# Patient Record
Sex: FEMALE | Race: WHITE | HISPANIC OR LATINO | Employment: FULL TIME | URBAN - NONMETROPOLITAN AREA
[De-identification: names, ages, dates, MRNs, and addresses within clinical notes are randomized per-mention and may not be internally consistent; named-entity substitution may affect disease eponyms.]

---

## 2017-08-05 ENCOUNTER — OFFICE VISIT (OUTPATIENT)
Dept: URGENT CARE | Facility: PHYSICIAN GROUP | Age: 62
End: 2017-08-05
Payer: MEDICAID

## 2017-08-05 ENCOUNTER — HOSPITAL ENCOUNTER (OUTPATIENT)
Facility: MEDICAL CENTER | Age: 62
End: 2017-08-05
Attending: PHYSICIAN ASSISTANT
Payer: MEDICAID

## 2017-08-05 VITALS
BODY MASS INDEX: 30.39 KG/M2 | WEIGHT: 178 LBS | OXYGEN SATURATION: 95 % | HEART RATE: 96 BPM | SYSTOLIC BLOOD PRESSURE: 114 MMHG | TEMPERATURE: 97.3 F | DIASTOLIC BLOOD PRESSURE: 78 MMHG | HEIGHT: 64 IN

## 2017-08-05 DIAGNOSIS — N30.00 ACUTE CYSTITIS WITHOUT HEMATURIA: Primary | ICD-10-CM

## 2017-08-05 DIAGNOSIS — R30.0 DYSURIA: ICD-10-CM

## 2017-08-05 DIAGNOSIS — N30.00 ACUTE CYSTITIS WITHOUT HEMATURIA: ICD-10-CM

## 2017-08-05 LAB
APPEARANCE UR: CLEAR
BILIRUB UR STRIP-MCNC: NORMAL MG/DL
COLOR UR AUTO: YELLOW
GLUCOSE UR STRIP.AUTO-MCNC: NORMAL MG/DL
KETONES UR STRIP.AUTO-MCNC: NORMAL MG/DL
LEUKOCYTE ESTERASE UR QL STRIP.AUTO: NORMAL
NITRITE UR QL STRIP.AUTO: NORMAL
PH UR STRIP.AUTO: 6.5 [PH] (ref 5–8)
PROT UR QL STRIP: NORMAL MG/DL
RBC UR QL AUTO: NORMAL
SP GR UR STRIP.AUTO: 1.01
UROBILINOGEN UR STRIP-MCNC: NORMAL MG/DL

## 2017-08-05 PROCEDURE — 87086 URINE CULTURE/COLONY COUNT: CPT

## 2017-08-05 PROCEDURE — 81002 URINALYSIS NONAUTO W/O SCOPE: CPT | Performed by: PHYSICIAN ASSISTANT

## 2017-08-05 PROCEDURE — 99214 OFFICE O/P EST MOD 30 MIN: CPT | Mod: 25 | Performed by: PHYSICIAN ASSISTANT

## 2017-08-05 RX ORDER — NITROFURANTOIN 25; 75 MG/1; MG/1
100 CAPSULE ORAL EVERY 12 HOURS
Qty: 10 CAP | Refills: 0 | Status: SHIPPED | OUTPATIENT
Start: 2017-08-05 | End: 2017-08-10

## 2017-08-05 RX ORDER — PHENAZOPYRIDINE HYDROCHLORIDE 200 MG/1
200 TABLET, FILM COATED ORAL 3 TIMES DAILY
Qty: 6 TAB | Refills: 0 | Status: SHIPPED | OUTPATIENT
Start: 2017-08-05 | End: 2017-08-07

## 2017-08-05 NOTE — PROGRESS NOTES
Chief Complaint   Patient presents with   • Dysuria       HISTORY OF PRESENT ILLNESS: Patient is a 62 y.o. female who presents today because she has a 5-70. History of increased urinary urgency, frequency, dysuria, lower abdominal pain. She denies any fevers, chills, nausea, vomiting or diarrhea. She has not been taking any medications for her symptoms.    Patient Active Problem List    Diagnosis Date Noted   • Diabetes mellitus, type II (CMS-[facility]) 10/17/2012       Allergies:Review of patient's allergies indicates no known allergies.    Current Outpatient Prescriptions Ordered in [facility]   Medication Sig Dispense Refill   • phenazopyridine (PYRIDIUM) 200 MG Tab Take 1 Tab by mouth 3 times a day for 2 days. 6 Tab 0   • nitrofurantoin monohydr macro (MACROBID) 100 MG Cap Take 1 Cap by mouth every 12 hours for 5 days. 10 Cap 0   • metformin (GLUCOPHAGE) 500 MG TABS TAKE ONE TABLET BY MOUTH TWICE DAILY WITH  MEALS 180 Each 2   • amoxicillin (AMOXIL) 875 MG tablet Take 1 Tab by mouth 2 times a day. 20 Tab 0     No current Epic-ordered facility-administered medications on file.       Past Medical History   Diagnosis Date   • Diabetes mellitus type II 10/17/2012       Social History   Substance Use Topics   • Smoking status: Never Smoker    • Smokeless tobacco: Never Used   • Alcohol Use: No       Family Status   Relation Status Death Age   • Mother Alive    • Father       Family History   Problem Relation Age of Onset   • Diabetes Neg Hx    • Hypertension Neg Hx    • Heart Disease Neg Hx    • Cancer Neg Hx        ROS:  Review of Systems   Constitutional: Negative for fever, chills, weight loss and malaise/fatigue.   HENT: Negative for ear pain, nosebleeds, congestion, sore throat and neck pain.    Eyes: Negative for blurred vision.   Respiratory: Negative for cough, sputum production, shortness of breath and wheezing.    Cardiovascular: Negative for chest pain, palpitations, orthopnea and leg swelling.  "  Gastrointestinal: Negative for heartburn, nausea, vomiting and positive for lower abdominal pain.   Genitourinary: Positive for dysuria, urgency and frequency.     Exam:  Blood pressure 114/78, pulse 96, temperature 36.3 °C (97.3 °F), height 1.626 m (5' 4\"), weight 80.74 kg (178 lb), SpO2 95 %.  General:  Well nourished, well developed female in NAD  Head:Normocephalic, atraumatic  Eyes: PERRLA, EOM within normal limits, no conjunctival injection, no scleral icterus, visual fields and acuity grossly intact.  Abdomen: Nondistended, bowel tones in all 4 quadrants, soft, she has superpubic tenderness to palpation, no other tenderness to palpation, no organomegaly, no rebound, referred tenderness, no Casas's or McBurney's point tenderness.  Extremities: no clubbing, cyanosis, or edema.        Please note that this dictation was created using voice recognition software. I have made every reasonable attempt to correct obvious errors, but I expect that there are errors of grammar and possibly content that I did not discover before finalizing the note.    Assessment/Plan:  1. Acute cystitis without hematuria  nitrofurantoin monohydr macro (MACROBID) 100 MG Cap    Urine Culture   2. Dysuria  POCT Urinalysis    phenazopyridine (PYRIDIUM) 200 MG Tab   Urinalysis in the office was unremarkable, however, symptomology and physical exam consistent with UTI, we'll culture the urine          Followup with primary care in the next 7-10 days if not significantly improving, return to the urgent care or go to the emergency room sooner for any worsening of symptoms.         "

## 2017-08-05 NOTE — MR AVS SNAPSHOT
"        Leti Arana   2017 12:35 PM   Office Visit   MRN: [id number]    Department:  [facility]   Dept Phone:  [phone]    Description:  Female : 1955   Provider:  Salvador Garcia PA-C           Reason for Visit     Dysuria           Allergies as of 2017     No Known Allergies      You were diagnosed with     Acute cystitis without hematuria   [229246]  -  Primary     Dysuria   [788.1.ICD-9-CM]         Vital Signs     Blood Pressure Pulse Temperature Height Weight Body Mass Index    114/78 mmHg 96 36.3 °C (97.3 °F) 1.626 m (5' 4\") 80.74 kg (178 lb) 30.54 kg/m2    Oxygen Saturation Smoking Status                95% Never Smoker           Basic Information     Date Of Birth Sex Race Ethnicity Preferred Language    1955 Female  or   Origin (Slovenian,Congolese,Lithuanian,Andorran, etc) English      Problem List              ICD-10-CM Priority Class Noted - Resolved    Diabetes mellitus, type II (CMS-HCC) E11.9   10/17/2012 - Present      Health Maintenance        Date Due Completion Dates    A1C SCREENING 1955 ---    DIABETES MONOFILAMENT / LE EXAM 1955 ---    RETINAL SCREENING 1973 ---    FASTING LIPID PROFILE 1973 ---    URINE ACR / MICROALBUMIN 1973 ---    SERUM CREATININE 1973 ---    IMM DTaP/Tdap/Td Vaccine (1 - Tdap) 1974 ---    IMM PNEUMOCOCCAL 19-64 (ADULT) MEDIUM RISK SERIES (1 of 1 - PPSV23) 1974 ---    PAP SMEAR 1976 ---    MAMMOGRAM 1995 ---    COLONOSCOPY 2005 ---    IMM ZOSTER VACCINE 2015 ---    IMM INFLUENZA (1) 2017 ---            Current Immunizations     No immunizations on file.      Below and/or attached are the medications your provider expects you to take. Review all of your home medications and newly ordered medications with your provider and/or pharmacist. Follow medication instructions as directed by your provider and/or pharmacist. Please keep your medication list with you " and share with your provider. Update the information when medications are discontinued, doses are changed, or new medications (including over-the-counter products) are added; and carry medication information at all times in the event of emergency situations     Allergies:  No Known Allergies          Medications  Valid as of: August 05, 2017 - 12:59 PM    Generic Name Brand Name Tablet Size Instructions for use    Amoxicillin (Tab) AMOXIL 875 MG Take 1 Tab by mouth 2 times a day.        MetFORMIN HCl (Tab) GLUCOPHAGE 500 MG TAKE ONE TABLET BY MOUTH TWICE DAILY WITH  MEALS        Nitrofurantoin Monohyd Macro (Cap) MACROBID 100 MG Take 1 Cap by mouth every 12 hours for 5 days.        Phenazopyridine HCl (Tab) PYRIDIUM 200 MG Take 1 Tab by mouth 3 times a day for 2 days.        .                 Medicines prescribed today were sent to:     [facility] [address] [place] - [address] AT [place] [address]    [address] [address]    Phone: [phone] Fax: [phone]    Open 24 Hours?: No    [facility] Critical access hospital[facility] [place], [place] - [address]    [address] [place] [address]    Phone: [phone] Fax: [phone]    Open 24 Hours?: No      Medication refill instructions:       If your prescription bottle indicates you have medication refills left, it is not necessary to call your provider’s office. Please contact your pharmacy and they will refill your medication.    If your prescription bottle indicates you do not have any refills left, you may request refills at any time through one of the following ways: The online [employer] system (except Urgent Care), by calling your provider’s office, or by asking your pharmacy to contact your provider’s office with a refill request. Medication refills are processed only during regular business hours and may not be available until the next business day. Your provider may request additional information or to have a follow-up visit with you  prior to refilling your medication.   *Please Note: Medication refills are assigned a new Rx number when refilled electronically. Your pharmacy may indicate that no refills were authorized even though a new prescription for the same medication is available at the pharmacy. Please request the medicine by name with the pharmacy before contacting your provider for a refill.        Your To Do List     Future Labs/Procedures Complete By Expires    Urine Culture  As directed 8/5/2018         [employer] Access Code: [email or web address]  Expires: 9/4/2017 12:33 PM    [employer]  A secure, online tool to manage your health information     [employer]’s [employer]® is a secure, online tool that connects you to your personalized health information from the privacy of your home -- day or night - making it very easy for you to manage your healthcare. Once the activation process is completed, you can even access your medical information using the [employer] joseph, which is available for free in the [employer] Joseph store or Google Play store.     [employer] provides the following levels of access (as shown below):   My Chart Features   [facility] Doctor [facility]  Specialists [facility]  Urgent  Care Non-[facility]  Primary Care  Doctor   Email your healthcare team securely and privately 24/7 X X X    Manage appointments: schedule your next appointment; view details of past/upcoming appointments X      Request prescription refills. X      View recent personal medical records, including lab and immunizations X X X X   View health record, including health history, allergies, medications X X X X   Read reports about your outpatient visits, procedures, consult and ER notes X X X X   See your discharge summary, which is a recap of your hospital and/or ER visit that includes your diagnosis, lab results, and care plan. X X       How to register for [employer]:  1. Go to  [email or web address].  2. Click on the Sign Up Now box, which takes you to the New Member  Sign Up page. You will need to provide the following information:  a. Enter your [employer] Access Code exactly as it appears at the top of this page. (You will not need to use this code after you’ve completed the sign-up process. If you do not sign up before the expiration date, you must request a new code.)   b. Enter your date of birth.   c. Enter your home email address.   d. Click Submit, and follow the next screen’s instructions.  3. Create a [employer] ID. This will be your [employer] login ID and cannot be changed, so think of one that is secure and easy to remember.  4. Create a [employer] password. You can change your password at any time.  5. Enter your Password Reset Question and Answer. This can be used at a later time if you forget your password.   6. Enter your e-mail address. This allows you to receive e-mail notifications when new information is available in [employer].  7. Click Sign Up. You can now view your health information.    For assistance activating your [employer] account, call [phone]

## 2017-08-07 LAB
BACTERIA UR CULT: NORMAL
SIGNIFICANT IND 70042: NORMAL
SOURCE SOURCE: NORMAL

## 2017-11-22 ENCOUNTER — OFFICE VISIT (OUTPATIENT)
Dept: URGENT CARE | Facility: PHYSICIAN GROUP | Age: 62
End: 2017-11-22
Payer: MEDICAID

## 2017-11-22 VITALS
DIASTOLIC BLOOD PRESSURE: 80 MMHG | HEIGHT: 63 IN | TEMPERATURE: 98.4 F | SYSTOLIC BLOOD PRESSURE: 120 MMHG | RESPIRATION RATE: 14 BRPM | OXYGEN SATURATION: 96 % | WEIGHT: 179 LBS | BODY MASS INDEX: 31.71 KG/M2 | HEART RATE: 80 BPM

## 2017-11-22 DIAGNOSIS — K08.89 PAIN, DENTAL: ICD-10-CM

## 2017-11-22 DIAGNOSIS — E66.9 OBESITY (BMI 30-39.9): ICD-10-CM

## 2017-11-22 DIAGNOSIS — K04.7 DENTAL INFECTION: Primary | ICD-10-CM

## 2017-11-22 PROCEDURE — 99214 OFFICE O/P EST MOD 30 MIN: CPT | Performed by: PHYSICIAN ASSISTANT

## 2017-11-22 RX ORDER — IBUPROFEN 600 MG/1
600 TABLET ORAL EVERY 6 HOURS PRN
COMMUNITY
End: 2020-02-28

## 2017-11-22 RX ORDER — TRAMADOL HYDROCHLORIDE 50 MG/1
50-100 TABLET ORAL EVERY 6 HOURS PRN
Qty: 30 TAB | Refills: 0 | Status: SHIPPED | OUTPATIENT
Start: 2017-11-22 | End: 2017-12-14

## 2017-11-22 RX ORDER — AMOXICILLIN AND CLAVULANATE POTASSIUM 875; 125 MG/1; MG/1
1 TABLET, FILM COATED ORAL 2 TIMES DAILY
Qty: 20 TAB | Refills: 0 | Status: SHIPPED | OUTPATIENT
Start: 2017-11-22 | End: 2017-12-02

## 2017-11-22 NOTE — PROGRESS NOTES
Chief Complaint   Patient presents with   • Dental Pain     R side tooth pulled, pain/swelling monday       HISTORY OF PRESENT ILLNESS: Patient is a 62 y.o. female who presents today because she has right lower tooth pain. She had a tooth pulled on Monday, 2 days ago. She states she was told by the dentist that she may have an infection in the area for a pulled tooth, but was not started on any antibiotics. She reports significant and worsening pain over the last 2 days. She has been taking over-the-counter Tylenol and ibuprofen without improvement. Denies any fevers, chills, nausea, vomiting or diarrhea    Patient Active Problem List    Diagnosis Date Noted   • Obesity (BMI 30-39.9) 2017   • Diabetes mellitus, type II (CMS-[facility]) 10/17/2012       Allergies:Patient has no known allergies.    Current Outpatient Prescriptions Ordered in [facility]   Medication Sig Dispense Refill   • ibuprofen (MOTRIN) 600 MG Tab Take 600 mg by mouth every 6 hours as needed.     • amoxicillin-clavulanate (AUGMENTIN) 875-125 MG Tab Take 1 Tab by mouth 2 times a day for 10 days. 20 Tab 0   • tramadol (ULTRAM) 50 MG Tab Take 1-2 Tabs by mouth every 6 hours as needed. 30 Tab 0   • metformin (GLUCOPHAGE) 500 MG TABS TAKE ONE TABLET BY MOUTH TWICE DAILY WITH  MEALS 180 Each 2   • amoxicillin (AMOXIL) 875 MG tablet Take 1 Tab by mouth 2 times a day. 20 Tab 0     No current Epic-ordered facility-administered medications on file.        Past Medical History:   Diagnosis Date   • Diabetes mellitus type II 10/17/2012       Social History   Substance Use Topics   • Smoking status: Never Smoker   • Smokeless tobacco: Never Used   • Alcohol use No       Family Status   Relation Status   • Mother Alive   • Father    • Neg Hx      Family History   Problem Relation Age of Onset   • Diabetes Neg Hx    • Hypertension Neg Hx    • Heart Disease Neg Hx    • Cancer Neg Hx        ROS:  Review of Systems   Constitutional: Negative for fever, chills,  "weight loss and malaise/fatigue.   HENT: Negative for ear pain, nosebleeds, congestion, sore throat and neck pain.    Eyes: Negative for blurred vision.   Respiratory: Negative for cough, sputum production, shortness of breath and wheezing.    Cardiovascular: Negative for chest pain, palpitations, orthopnea and leg swelling.   Gastrointestinal: Negative for heartburn, nausea, vomiting and abdominal pain.   Genitourinary: Negative for dysuria, urgency and frequency.     Exam:  Blood pressure 120/80, pulse 80, temperature 36.9 °C (98.4 °F), resp. rate 14, height 1.6 m (5' 3\"), weight 81.2 kg (179 lb), SpO2 96 %.  General:  Well nourished, well developed female in NAD  Head:Normocephalic, atraumatic  Eyes: PERRLA, EOM within normal limits, no conjunctival injection, no scleral icterus, visual fields and acuity grossly intact.  Nose: Symmetrical without tenderness, no discharge.  Mouth: overall fairly poor hygiene with multiple areas of decay. Tooth #28, the socket appears erythematous, has a small amount of pale yellow drainage in the area. There is surrounding gumline erythema, without any fluctuance or induration., no erythema exudates or tonsillar enlargement.  Neck: no masses, range of motion within normal limits, no tracheal deviation. No obvious thyroid enlargement.  Pulmonary: chest is symmetrical with respiration, no wheezes, crackles, or rhonchi.  Cardiovascular: regular rate and rhythm without murmurs, rubs, or gallops.  Extremities: no clubbing, cyanosis, or edema.    Please note that this dictation was created using voice recognition software. I have made every reasonable attempt to correct obvious errors, but I expect that there are errors of grammar and possibly content that I did not discover before finalizing the note.    Assessment/Plan:  1. Dental infection  amoxicillin-clavulanate (AUGMENTIN) 875-125 MG Tab   2. Pain, dental  tramadol (ULTRAM) 50 MG Tab   3. Obesity (BMI 30-39.9)  Patient identified " as having weight management issue.  Appropriate orders and counseling given.   Follow-up with dentist    Followup with primary care in the next 7-10 days if not significantly improving, return to the urgent care or go to the emergency room sooner for any worsening of symptoms.

## 2017-12-14 ENCOUNTER — OFFICE VISIT (OUTPATIENT)
Dept: URGENT CARE | Facility: PHYSICIAN GROUP | Age: 62
End: 2017-12-14
Payer: MEDICAID

## 2017-12-14 VITALS
SYSTOLIC BLOOD PRESSURE: 122 MMHG | HEART RATE: 123 BPM | HEIGHT: 63 IN | RESPIRATION RATE: 16 BRPM | TEMPERATURE: 100.7 F | WEIGHT: 178 LBS | DIASTOLIC BLOOD PRESSURE: 84 MMHG | OXYGEN SATURATION: 96 % | BODY MASS INDEX: 31.54 KG/M2

## 2017-12-14 DIAGNOSIS — R11.2 NON-INTRACTABLE VOMITING WITH NAUSEA, UNSPECIFIED VOMITING TYPE: ICD-10-CM

## 2017-12-14 DIAGNOSIS — J11.1 INFLUENZA-LIKE ILLNESS: ICD-10-CM

## 2017-12-14 LAB
FLUAV+FLUBV AG SPEC QL IA: NORMAL
GLUCOSE BLD-MCNC: 127 MG/DL (ref 70–100)
INT CON NEG: NORMAL
INT CON POS: NORMAL

## 2017-12-14 PROCEDURE — 87804 INFLUENZA ASSAY W/OPTIC: CPT | Performed by: PHYSICIAN ASSISTANT

## 2017-12-14 PROCEDURE — 82962 GLUCOSE BLOOD TEST: CPT | Performed by: PHYSICIAN ASSISTANT

## 2017-12-14 PROCEDURE — 99214 OFFICE O/P EST MOD 30 MIN: CPT | Performed by: PHYSICIAN ASSISTANT

## 2017-12-14 RX ORDER — ONDANSETRON 4 MG/1
4 TABLET, ORALLY DISINTEGRATING ORAL EVERY 8 HOURS PRN
Qty: 10 TAB | Refills: 0 | Status: SHIPPED | OUTPATIENT
Start: 2017-12-14 | End: 2019-03-05 | Stop reason: CLARIF

## 2017-12-14 RX ORDER — ONDANSETRON 4 MG/1
4 TABLET, ORALLY DISINTEGRATING ORAL ONCE
Status: COMPLETED | OUTPATIENT
Start: 2017-12-14 | End: 2017-12-14

## 2017-12-14 RX ADMIN — ONDANSETRON 4 MG: 4 TABLET, ORALLY DISINTEGRATING ORAL at 14:17

## 2017-12-14 ASSESSMENT — ENCOUNTER SYMPTOMS
COUGH: 1
SHORTNESS OF BREATH: 0
MYALGIAS: 1
SORE THROAT: 1
VOMITING: 1
CHILLS: 1
CONSTIPATION: 1
FEVER: 1
WHEEZING: 0
NAUSEA: 1
SPUTUM PRODUCTION: 0
ABDOMINAL PAIN: 1
HEADACHES: 1

## 2017-12-14 NOTE — PATIENT INSTRUCTIONS
"Influenza, Adult  Influenza (\"the flu\") is a viral infection of the respiratory tract. It occurs more often in winter months because people spend more time in close contact with one another. Influenza can make you feel very sick. Influenza easily spreads from person to person (contagious).  CAUSES   Influenza is caused by a virus that infects the respiratory tract. You can catch the virus by breathing in droplets from an infected person's cough or sneeze. You can also catch the virus by touching something that was recently contaminated with the virus and then touching your mouth, nose, or eyes.  RISKS AND COMPLICATIONS  You may be at risk for a more severe case of influenza if you smoke cigarettes, have diabetes, have chronic heart disease (such as heart failure) or lung disease (such as asthma), or if you have a weakened immune system. Elderly people and pregnant women are also at risk for more serious infections. The most common problem of influenza is a lung infection (pneumonia). Sometimes, this problem can require emergency medical care and may be life threatening.  SIGNS AND SYMPTOMS   Symptoms typically last 4 to 10 days and may include:  · Fever.  · Chills.  · Headache, body aches, and muscle aches.  · Sore throat.  · Chest discomfort and cough.  · Poor appetite.  · Weakness or feeling tired.  · Dizziness.  · Nausea or vomiting.  DIAGNOSIS   Diagnosis of influenza is often made based on your history and a physical exam. A nose or throat swab test can be done to confirm the diagnosis.  TREATMENT   In mild cases, influenza goes away on its own. Treatment is directed at relieving symptoms. For more severe cases, your health care provider may prescribe antiviral medicines to shorten the sickness. Antibiotic medicines are not effective because the infection is caused by a virus, not by bacteria.  HOME CARE INSTRUCTIONS  · Take medicines only as directed by your health care provider.  · Use a cool mist humidifier " to make breathing easier.  · Get plenty of rest until your temperature returns to normal. This usually takes 3 to 4 days.  · Drink enough fluid to keep your urine clear or pale yellow.  · Cover your mouth and nose when coughing or sneezing, and wash your hands well to prevent the virus from spreading.  · Stay home from work or school until the fever is gone for at least 1 full day.  PREVENTION   An annual influenza vaccination (flu shot) is the best way to avoid getting influenza. An annual flu shot is now routinely recommended for all adults in the [place]  SEEK MEDICAL CARE IF:  · You experience chest pain, your cough worsens, or you produce more mucus.  · You have nausea, vomiting, or diarrhea.  · Your fever returns or gets worse.  SEEK IMMEDIATE MEDICAL CARE IF:  · You have trouble breathing, you become short of breath, or your skin or nails become bluish.  · You have severe pain or stiffness in the neck.  · You develop a sudden headache, or pain in the face or ear.  · You have nausea or vomiting that you cannot control.  MAKE SURE YOU:   · Understand these instructions.  · Will watch your condition.  · Will get help right away if you are not doing well or get worse.     This information is not intended to replace advice given to you by your health care provider. Make sure you discuss any questions you have with your health care provider.     Document Released: 12/15/2001 Document Revised: 01/08/2016 Document Reviewed: 03/18/2013  [employer] Interactive Patient Education ©2016 [employer]  Nausea and Vomiting  Nausea is a sick feeling that often comes before throwing up (vomiting). Vomiting is a reflex where stomach contents come out of your mouth. Vomiting can cause severe loss of body fluids (dehydration). Children and elderly adults can become dehydrated quickly, especially if they also have diarrhea. Nausea and vomiting are symptoms of a condition or disease. It is important to find the cause of your  symptoms.  CAUSES   · Direct irritation of the stomach lining. This irritation can result from increased acid production (gastroesophageal reflux disease), infection, food poisoning, taking certain medicines (such as nonsteroidal anti-inflammatory drugs), alcohol use, or tobacco use.  · Signals from the brain. These signals could be caused by a headache, heat exposure, an inner ear disturbance, increased pressure in the brain from injury, infection, a tumor, or a concussion, pain, emotional stimulus, or metabolic problems.  · An obstruction in the gastrointestinal tract (bowel obstruction).  · Illnesses such as diabetes, hepatitis, gallbladder problems, appendicitis, kidney problems, cancer, sepsis, atypical symptoms of a heart attack, or eating disorders.  · Medical treatments such as chemotherapy and radiation.  · Receiving medicine that makes you sleep (general anesthetic) during surgery.  DIAGNOSIS  Your caregiver may ask for tests to be done if the problems do not improve after a few days. Tests may also be done if symptoms are severe or if the reason for the nausea and vomiting is not clear. Tests may include:  · Urine tests.  · Blood tests.  · Stool tests.  · Cultures (to look for evidence of infection).  · X-rays or other imaging studies.  Test results can help your caregiver make decisions about treatment or the need for additional tests.  TREATMENT  You need to stay well hydrated. Drink frequently but in small amounts. You may wish to drink water, sports drinks, clear broth, or eat frozen ice pops or gelatin dessert to help stay hydrated. When you eat, eating slowly may help prevent nausea. There are also some antinausea medicines that may help prevent nausea.  HOME CARE INSTRUCTIONS   · Take all medicine as directed by your caregiver.  · If you do not have an appetite, do not force yourself to eat. However, you must continue to drink fluids.  · If you have an appetite, eat a normal diet unless your  caregiver tells you differently.  ¨ Eat a variety of complex carbohydrates (rice, wheat, potatoes, bread), lean meats, yogurt, fruits, and vegetables.  ¨ Avoid high-fat foods because they are more difficult to digest.  · Drink enough water and fluids to keep your urine clear or pale yellow.  · If you are dehydrated, ask your caregiver for specific rehydration instructions. Signs of dehydration may include:  ¨ Severe thirst.  ¨ Dry lips and mouth.  ¨ Dizziness.  ¨ Dark urine.  ¨ Decreasing urine frequency and amount.  ¨ Confusion.  ¨ Rapid breathing or pulse.  SEEK IMMEDIATE MEDICAL CARE IF:   · You have blood or brown flecks (like coffee grounds) in your vomit.  · You have black or bloody stools.  · You have a severe headache or stiff neck.  · You are confused.  · You have severe abdominal pain.  · You have chest pain or trouble breathing.  · You do not urinate at least once every 8 hours.  · You develop cold or clammy skin.  · You continue to vomit for longer than 24 to 48 hours.  · You have a fever.  MAKE SURE YOU:   · Understand these instructions.  · Will watch your condition.  · Will get help right away if you are not doing well or get worse.     This information is not intended to replace advice given to you by your health care provider. Make sure you discuss any questions you have with your health care provider.     Document Released: 12/18/2006 Document Revised: 03/11/2013 Document Reviewed: 05/16/2012  [employer] Interactive Patient Education ©2016 [employer]

## 2017-12-14 NOTE — PROGRESS NOTES
Subjective:      Leti Arana is a 62 y.o. female who presents with Fever (x1day mid back pain, dizzy, tired, nausea, cough, chest burning)            Fever, chills, body aches, fatigue, nausea, vomiting, cough, generalized not feeling well for one day. Symptoms worsening. No shortness of breath or wheezing.      Fever    This is a new problem. The current episode started today. The problem occurs constantly. The problem has been gradually worsening. Her temperature was unmeasured prior to arrival. Associated symptoms include abdominal pain, congestion, coughing, headaches, muscle aches, nausea, a sore throat and vomiting. Pertinent negatives include no rash or wheezing. She has tried nothing for the symptoms. The treatment provided no relief.       Review of Systems   Constitutional: Positive for chills, fever and malaise/fatigue.   HENT: Positive for congestion and sore throat.    Respiratory: Positive for cough. Negative for sputum production, shortness of breath and wheezing.    Gastrointestinal: Positive for abdominal pain, constipation, nausea and vomiting.   Musculoskeletal: Positive for myalgias.   Skin: Negative for rash.   Neurological: Positive for headaches.     Allergies:Patient has no known allergies.    Current Outpatient Prescriptions Ordered in [facility]   Medication Sig Dispense Refill   • metformin (GLUCOPHAGE) 500 MG TABS TAKE ONE TABLET BY MOUTH TWICE DAILY WITH  MEALS 180 Each 2   • ibuprofen (MOTRIN) 600 MG Tab Take 600 mg by mouth every 6 hours as needed.       Current Facility-Administered Medications Ordered in Epic   Medication Dose Route Frequency Provider Last Rate Last Dose   • ondansetron (ZOFRAN ODT) dispertab 4 mg  4 mg Oral Once FERNANDO Wills           Past Medical History:   Diagnosis Date   • Diabetes mellitus type II 10/17/2012       Social History   Substance Use Topics   • Smoking status: Never Smoker   • Smokeless tobacco: Never Used   • Alcohol use No       Family  "Status   Relation Status   • Mother Alive   • Father    • Neg Hx      Family History   Problem Relation Age of Onset   • Diabetes Neg Hx    • Hypertension Neg Hx    • Heart Disease Neg Hx    • Cancer Neg Hx           Objective:     /84   Pulse (!) 123   Temp (!) 38.2 °C (100.7 °F)   Resp 16   Ht 1.6 m (5' 2.99\")   Wt 80.7 kg (178 lb)   SpO2 96%   Breastfeeding? No   BMI 31.54 kg/m²      Physical Exam   Constitutional: She is oriented to person, place, and time. She appears well-developed and well-nourished.   Appears uncomfortable   HENT:   Head: Normocephalic and atraumatic.   Right Ear: External ear normal.   Left Ear: External ear normal.   Mouth/Throat: Oropharynx is clear and moist.   Canals and tympanic membranes unremarkable   Eyes: Right eye exhibits no discharge. Left eye exhibits no discharge.   Neck: Normal range of motion. Neck supple.   Cardiovascular: Regular rhythm.    Tachycardic   Pulmonary/Chest: Effort normal and breath sounds normal. She has no wheezes. She has no rales.   Neurological: She is alert and oriented to person, place, and time.   Skin: Skin is warm and dry. She is not diaphoretic.   Psychiatric: She has a normal mood and affect. Her behavior is normal. Judgment and thought content normal.   Nursing note and vitals reviewed.    Labs: Rapid influenza negative. Fingerstick glucose 127          Assessment/Plan:     1. Influenza-like illness  POCT Influenza A/B    POCT Glucose    Fever, chills, body aches. Likely influenza. Rapid test negative. Given written instructions. Follow-up with PCP.   2. Non-intractable vomiting with nausea, unspecified vomiting type  POCT Glucose    ondansetron (ZOFRAN ODT) dispertab 4 mg    ondansetron (ZOFRAN ODT) 4 MG TABLET DISPERSIBLE    Fluctuating, much improved with Zofran. Given prescription for Zofran. Follow-up with PCP. Return if worsening       Elsevier Interactive Patient Education given:Influenza, nausea and vomiting    Please " note that this dictation was created using voice recognition software. I have made every reasonable attempt to correct obvious errors, but I expect that there are errors of grammar and possibly content that I did not discover before finalizing the note.

## 2018-02-08 ENCOUNTER — NON-PROVIDER VISIT (OUTPATIENT)
Dept: URGENT CARE | Facility: PHYSICIAN GROUP | Age: 63
End: 2018-02-08

## 2018-02-08 DIAGNOSIS — Z02.1 PRE-EMPLOYMENT DRUG SCREENING: ICD-10-CM

## 2018-02-08 LAB
AMP AMPHETAMINE: NORMAL
COC COCAINE: NORMAL
INT CON NEG: NORMAL
INT CON POS: NORMAL
MET METHAMPHETAMINES: NORMAL
OPI OPIATES: NORMAL
PCP PHENCYCLIDINE: NORMAL
POC DRUG COMMENT 753798-OCCUPATIONAL HEALTH: NEGATIVE
THC: NORMAL

## 2018-02-08 PROCEDURE — 80305 DRUG TEST PRSMV DIR OPT OBS: CPT | Performed by: PHYSICIAN ASSISTANT

## 2018-02-16 ENCOUNTER — NON-PROVIDER VISIT (OUTPATIENT)
Dept: URGENT CARE | Facility: PHYSICIAN GROUP | Age: 63
End: 2018-02-16

## 2018-02-16 DIAGNOSIS — Z23 IMMUNIZATION DUE: ICD-10-CM

## 2018-02-16 PROCEDURE — 90746 HEPB VACCINE 3 DOSE ADULT IM: CPT | Performed by: PHYSICIAN ASSISTANT

## 2018-03-16 ENCOUNTER — NON-PROVIDER VISIT (OUTPATIENT)
Dept: URGENT CARE | Facility: PHYSICIAN GROUP | Age: 63
End: 2018-03-16

## 2018-03-16 DIAGNOSIS — Z23 NEED FOR HEPATITIS B VACCINATION: ICD-10-CM

## 2018-03-16 PROCEDURE — 90471 IMMUNIZATION ADMIN: CPT | Performed by: PHYSICIAN ASSISTANT

## 2018-03-16 PROCEDURE — 90746 HEPB VACCINE 3 DOSE ADULT IM: CPT | Performed by: PHYSICIAN ASSISTANT

## 2018-05-18 ENCOUNTER — OCCUPATIONAL MEDICINE (OUTPATIENT)
Dept: URGENT CARE | Facility: PHYSICIAN GROUP | Age: 63
End: 2018-05-18

## 2018-05-18 VITALS
WEIGHT: 174 LBS | OXYGEN SATURATION: 99 % | HEART RATE: 80 BPM | RESPIRATION RATE: 16 BRPM | SYSTOLIC BLOOD PRESSURE: 118 MMHG | BODY MASS INDEX: 29.71 KG/M2 | TEMPERATURE: 97.7 F | HEIGHT: 64 IN | DIASTOLIC BLOOD PRESSURE: 72 MMHG

## 2018-05-18 DIAGNOSIS — Z02.1 PRE-EMPLOYMENT EXAMINATION: ICD-10-CM

## 2018-05-18 DIAGNOSIS — Z02.1 PRE-EMPLOYMENT DRUG SCREENING: ICD-10-CM

## 2018-05-18 LAB
AMP AMPHETAMINE: NORMAL
BAR BARBITURATES: NORMAL
BREATH ALCOHOL COMMENT: NORMAL
BZO BENZODIAZEPINES: NORMAL
COC COCAINE: NORMAL
INT CON NEG: NORMAL
INT CON POS: NORMAL
MDMA ECSTASY: NORMAL
MET METHAMPHETAMINES: NORMAL
MTD METHADONE: NORMAL
OPI OPIATES: NORMAL
OXY OXYCODONE: NORMAL
PCP PHENCYCLIDINE: NORMAL
POC BREATHALIZER: 0 PERCENT (ref 0–0.01)
POC URINE DRUG SCREEN OCDRS: NEGATIVE
THC: NORMAL

## 2018-05-18 PROCEDURE — 80305 DRUG TEST PRSMV DIR OPT OBS: CPT | Performed by: NURSE PRACTITIONER

## 2018-05-18 PROCEDURE — 82075 ASSAY OF BREATH ETHANOL: CPT | Performed by: NURSE PRACTITIONER

## 2018-05-18 PROCEDURE — 8915 PR COMPREHENSIVE PHYSICAL: Performed by: NURSE PRACTITIONER

## 2018-05-18 ASSESSMENT — VISUAL ACUITY
OD_CC: 20/50
OS_CC: 20/50

## 2018-05-18 ASSESSMENT — ENCOUNTER SYMPTOMS
NEUROLOGICAL NEGATIVE: 1
EYES NEGATIVE: 1
PSYCHIATRIC NEGATIVE: 1
GASTROINTESTINAL NEGATIVE: 1
MUSCULOSKELETAL NEGATIVE: 1
CARDIOVASCULAR NEGATIVE: 1
CONSTITUTIONAL NEGATIVE: 1
RESPIRATORY NEGATIVE: 1

## 2018-05-18 NOTE — PROGRESS NOTES
"Subjective:      Leti Arana is a 63 y.o. female who presents with Employment Physical            HPI  Patient presents for employee physical.  Patient has no present concerns or problems.    PMH:  has a past medical history of Diabetes mellitus type II (10/17/2012).  MEDS:   Current Outpatient Prescriptions:   •  ondansetron (ZOFRAN ODT) 4 MG TABLET DISPERSIBLE, Take 1 Tab by mouth every 8 hours as needed., Disp: 10 Tab, Rfl: 0  •  ibuprofen (MOTRIN) 600 MG Tab, Take 600 mg by mouth every 6 hours as needed., Disp: , Rfl:   •  metformin (GLUCOPHAGE) 500 MG TABS, TAKE ONE TABLET BY MOUTH TWICE DAILY WITH  MEALS, Disp: 180 Each, Rfl: 2  ALLERGIES: No Known Allergies  SURGHX:   Past Surgical History:   Procedure Laterality Date   • ABDOMINAL HYSTERECTOMY TOTAL      total   • CHOLECYSTECTOMY       SOCHX:  reports that she has never smoked. She has never used smokeless tobacco. She reports that she does not drink alcohol or use drugs.  FH: family history is not on file.    Review of Systems   Constitutional: Negative.    HENT: Negative.    Eyes: Negative.    Respiratory: Negative.    Cardiovascular: Negative.    Gastrointestinal: Negative.    Genitourinary: Negative.    Musculoskeletal: Negative.    Skin: Negative.    Neurological: Negative.    Endo/Heme/Allergies: Negative.    Psychiatric/Behavioral: Negative.           Objective:     /72   Pulse 80   Temp 36.5 °C (97.7 °F)   Resp 16   Ht 1.626 m (5' 4\")   Wt 78.9 kg (174 lb)   SpO2 99%   BMI 29.87 kg/m²      Physical Exam   Constitutional: She is oriented to person, place, and time. She appears well-developed and well-nourished.   HENT:   Head: Normocephalic and atraumatic.   Right Ear: Hearing, tympanic membrane, external ear and ear canal normal.   Left Ear: Hearing, tympanic membrane, external ear and ear canal normal.   Nose: Nose normal.   Mouth/Throat: Uvula is midline, oropharynx is clear and moist and mucous membranes are normal.   Eyes: " Conjunctivae are normal.   Neck: Normal range of motion. Neck supple.   Cardiovascular: Normal rate, regular rhythm and normal heart sounds.    Pulmonary/Chest: Effort normal and breath sounds normal.   Abdominal: Soft. Bowel sounds are normal.   Musculoskeletal: Normal range of motion.   Neurological: She is alert and oriented to person, place, and time.   Skin: Skin is warm and dry. Capillary refill takes less than 2 seconds.   Psychiatric: She has a normal mood and affect. Her behavior is normal. Judgment and thought content normal.               Assessment/Plan:     1. Pre-employment examination     2. Pre-employment drug screening  POCT 11 Panel Urine Drug Screen    POCT Breath Alcohol Test     Forms filled out  Forms given to MA to given copy to pt and scan a copy into pt's chart  f/u for any other questions or concerns

## 2018-10-05 ENCOUNTER — OFFICE VISIT (OUTPATIENT)
Dept: URGENT CARE | Facility: PHYSICIAN GROUP | Age: 63
End: 2018-10-05
Payer: COMMERCIAL

## 2018-10-05 VITALS
BODY MASS INDEX: 29.71 KG/M2 | TEMPERATURE: 98 F | HEART RATE: 84 BPM | WEIGHT: 174 LBS | DIASTOLIC BLOOD PRESSURE: 76 MMHG | HEIGHT: 64 IN | SYSTOLIC BLOOD PRESSURE: 118 MMHG | OXYGEN SATURATION: 98 % | RESPIRATION RATE: 16 BRPM

## 2018-10-05 DIAGNOSIS — K57.90 DIVERTICULOSIS OF INTESTINE WITHOUT BLEEDING, UNSPECIFIED INTESTINAL TRACT LOCATION: Primary | ICD-10-CM

## 2018-10-05 DIAGNOSIS — R10.30 LOWER ABDOMINAL PAIN: ICD-10-CM

## 2018-10-05 LAB
APPEARANCE UR: NORMAL
BILIRUB UR STRIP-MCNC: NORMAL MG/DL
COLOR UR AUTO: NORMAL
GLUCOSE UR STRIP.AUTO-MCNC: NORMAL MG/DL
KETONES UR STRIP.AUTO-MCNC: NORMAL MG/DL
LEUKOCYTE ESTERASE UR QL STRIP.AUTO: NORMAL
NITRITE UR QL STRIP.AUTO: NORMAL
PH UR STRIP.AUTO: 5.5 [PH] (ref 5–8)
PROT UR QL STRIP: NORMAL MG/DL
RBC UR QL AUTO: NORMAL
SP GR UR STRIP.AUTO: 1.01
UROBILINOGEN UR STRIP-MCNC: NORMAL MG/DL

## 2018-10-05 PROCEDURE — 81002 URINALYSIS NONAUTO W/O SCOPE: CPT | Performed by: PHYSICIAN ASSISTANT

## 2018-10-05 PROCEDURE — 99214 OFFICE O/P EST MOD 30 MIN: CPT | Performed by: PHYSICIAN ASSISTANT

## 2018-10-05 RX ORDER — METRONIDAZOLE 500 MG/1
500 TABLET ORAL 3 TIMES DAILY
Qty: 30 TAB | Refills: 0 | Status: SHIPPED | OUTPATIENT
Start: 2018-10-05 | End: 2018-10-15

## 2018-10-05 RX ORDER — CIPROFLOXACIN 500 MG/1
500 TABLET, FILM COATED ORAL 2 TIMES DAILY
Qty: 10 TAB | Refills: 0 | Status: SHIPPED | OUTPATIENT
Start: 2018-10-05 | End: 2018-10-10

## 2018-10-05 NOTE — PROGRESS NOTES
No chief complaint on file.      HISTORY OF PRESENT ILLNESS: Patient is a 63 y.o. female who presents today because she has a 2-day history of left lower quadrant pain.  She reports normal bowel movements, last one was in the last several hours.  No blood or mucus.  Denies any fevers, chills, nausea, vomiting or diarrhea.  She does report a history of diverticulosis with flareups of diverticulitis, this feels similar.  She is unable to get into her primary care provider today.    Patient Active Problem List    Diagnosis Date Noted   • Obesity (BMI 30-39.9) 2017   • Diabetes mellitus, type II ([facility]) 10/17/2012       Allergies:Patient has no known allergies.    Current Outpatient Prescriptions Ordered in [facility]   Medication Sig Dispense Refill   • ciprofloxacin (CIPRO) 500 MG Tab Take 1 Tab by mouth 2 times a day for 5 days. 10 Tab 0   • metroNIDAZOLE (FLAGYL) 500 MG Tab Take 1 Tab by mouth 3 times a day for 10 days. 30 Tab 0   • ondansetron (ZOFRAN ODT) 4 MG TABLET DISPERSIBLE Take 1 Tab by mouth every 8 hours as needed. 10 Tab 0   • ibuprofen (MOTRIN) 600 MG Tab Take 600 mg by mouth every 6 hours as needed.     • metformin (GLUCOPHAGE) 500 MG TABS TAKE ONE TABLET BY MOUTH TWICE DAILY WITH  MEALS 180 Each 2     No current Epic-ordered facility-administered medications on file.        Past Medical History:   Diagnosis Date   • Diabetes mellitus type II 10/17/2012       Social History   Substance Use Topics   • Smoking status: Never Smoker   • Smokeless tobacco: Never Used   • Alcohol use No       Family Status   Relation Status   • Mo Alive   • Fa    • Neg Hx (Not Specified)     Family History   Problem Relation Age of Onset   • Diabetes Neg Hx    • Hypertension Neg Hx    • Heart Disease Neg Hx    • Cancer Neg Hx        ROS:  Review of Systems   Constitutional: Negative for fever, chills, weight loss and malaise/fatigue.   HENT: Negative for ear pain, nosebleeds, congestion, sore throat and neck pain.   "  Eyes: Negative for blurred vision.   Respiratory: Negative for cough, sputum production, shortness of breath and wheezing.    Cardiovascular: Negative for chest pain, palpitations, orthopnea and leg swelling.   Gastrointestinal: Negative for heartburn, nausea, vomiting and positive for left lower abdominal pain.   Genitourinary: Negative for dysuria, urgency and frequency.     Exam:  Blood pressure 118/76, pulse 84, temperature 36.7 °C (98 °F), temperature source Temporal, resp. rate 16, height 1.626 m (5' 4\"), weight 78.9 kg (174 lb), SpO2 98 %, not currently breastfeeding.  General:  Well nourished, well developed female in NAD  Head:Normocephalic, atraumatic  Eyes: PERRLA, EOM within normal limits, no conjunctival injection, no scleral icterus, visual fields and acuity grossly intact.  Nose: Symmetrical without tenderness, no discharge.  Mouth: reasonable hygiene, no erythema exudates or tonsillar enlargement.  Neck: no masses, range of motion within normal limits, no tracheal deviation. No obvious thyroid enlargement.  Pulmonary: chest is symmetrical with respiration, no wheezes, crackles, or rhonchi.  Cardiovascular: regular rate and rhythm without murmurs, rubs, or gallops.  Abdomen: Visible scarring from previous surgery.  Bowel tones in all 4 quadrants, soft, she has significant left lower quadrant tenderness to palpation, slightly radiating towards the left upper quadrant.  Extremities: no clubbing, cyanosis, or edema.    Urinalysis in the office: Trace leukocyte esterase, otherwise unremarkable.    Please note that this dictation was created using voice recognition software. I have made every reasonable attempt to correct obvious errors, but I expect that there are errors of grammar and possibly content that I did not discover before finalizing the note.    Assessment/Plan:  1. Diverticulosis of intestine without bleeding, unspecified intestinal tract location  ciprofloxacin (CIPRO) 500 MG Tab    " metroNIDAZOLE (FLAGYL) 500 MG Tab   2. Lower abdominal pain  POCT Urinalysis       Followup with primary care in the next 7-10 days if not significantly improving, return to the urgent care or go to the emergency room sooner for any worsening of symptoms.

## 2019-03-05 ENCOUNTER — APPOINTMENT (OUTPATIENT)
Dept: RADIOLOGY | Facility: MEDICAL CENTER | Age: 64
DRG: 392 | End: 2019-03-05
Attending: EMERGENCY MEDICINE
Payer: COMMERCIAL

## 2019-03-05 ENCOUNTER — HOSPITAL ENCOUNTER (INPATIENT)
Facility: MEDICAL CENTER | Age: 64
LOS: 3 days | DRG: 392 | End: 2019-03-08
Attending: EMERGENCY MEDICINE | Admitting: HOSPITALIST
Payer: COMMERCIAL

## 2019-03-05 DIAGNOSIS — K57.92 DIVERTICULITIS: ICD-10-CM

## 2019-03-05 DIAGNOSIS — R10.9 ABDOMINAL PAIN, UNSPECIFIED ABDOMINAL LOCATION: ICD-10-CM

## 2019-03-05 PROBLEM — R10.32 LEFT LOWER QUADRANT PAIN: Status: ACTIVE | Noted: 2019-03-05

## 2019-03-05 LAB
ALBUMIN SERPL BCP-MCNC: 4.3 G/DL (ref 3.2–4.9)
ALBUMIN/GLOB SERPL: 1.3 G/DL
ALP SERPL-CCNC: 58 U/L (ref 30–99)
ALT SERPL-CCNC: 30 U/L (ref 2–50)
ANION GAP SERPL CALC-SCNC: 10 MMOL/L (ref 0–11.9)
APPEARANCE UR: CLEAR
AST SERPL-CCNC: 23 U/L (ref 12–45)
BASOPHILS # BLD AUTO: 0.5 % (ref 0–1.8)
BASOPHILS # BLD: 0.05 K/UL (ref 0–0.12)
BILIRUB SERPL-MCNC: 0.5 MG/DL (ref 0.1–1.5)
BILIRUB UR QL STRIP.AUTO: NEGATIVE
BUN SERPL-MCNC: 5 MG/DL (ref 8–22)
CALCIUM SERPL-MCNC: 9.5 MG/DL (ref 8.5–10.5)
CHLORIDE SERPL-SCNC: 100 MMOL/L (ref 96–112)
CO2 SERPL-SCNC: 23 MMOL/L (ref 20–33)
COLOR UR: YELLOW
CREAT SERPL-MCNC: 0.71 MG/DL (ref 0.5–1.4)
EOSINOPHIL # BLD AUTO: 0.02 K/UL (ref 0–0.51)
EOSINOPHIL NFR BLD: 0.2 % (ref 0–6.9)
ERYTHROCYTE [DISTWIDTH] IN BLOOD BY AUTOMATED COUNT: 38.9 FL (ref 35.9–50)
GLOBULIN SER CALC-MCNC: 3.4 G/DL (ref 1.9–3.5)
GLUCOSE BLD-MCNC: 111 MG/DL (ref 65–99)
GLUCOSE SERPL-MCNC: 127 MG/DL (ref 65–99)
GLUCOSE UR STRIP.AUTO-MCNC: NEGATIVE MG/DL
HCG SERPL QL: NEGATIVE
HCT VFR BLD AUTO: 42.1 % (ref 37–47)
HGB BLD-MCNC: 14.3 G/DL (ref 12–16)
IMM GRANULOCYTES # BLD AUTO: 0.03 K/UL (ref 0–0.11)
IMM GRANULOCYTES NFR BLD AUTO: 0.3 % (ref 0–0.9)
KETONES UR STRIP.AUTO-MCNC: ABNORMAL MG/DL
LACTATE BLD-SCNC: 1.5 MMOL/L (ref 0.5–2)
LEUKOCYTE ESTERASE UR QL STRIP.AUTO: NEGATIVE
LIPASE SERPL-CCNC: 13 U/L (ref 11–82)
LYMPHOCYTES # BLD AUTO: 3.56 K/UL (ref 1–4.8)
LYMPHOCYTES NFR BLD: 36.8 % (ref 22–41)
MAGNESIUM SERPL-MCNC: 2 MG/DL (ref 1.5–2.5)
MCH RBC QN AUTO: 30 PG (ref 27–33)
MCHC RBC AUTO-ENTMCNC: 34 G/DL (ref 33.6–35)
MCV RBC AUTO: 88.3 FL (ref 81.4–97.8)
MICRO URNS: ABNORMAL
MONOCYTES # BLD AUTO: 0.41 K/UL (ref 0–0.85)
MONOCYTES NFR BLD AUTO: 4.2 % (ref 0–13.4)
NEUTROPHILS # BLD AUTO: 5.61 K/UL (ref 2–7.15)
NEUTROPHILS NFR BLD: 58 % (ref 44–72)
NITRITE UR QL STRIP.AUTO: NEGATIVE
NRBC # BLD AUTO: 0 K/UL
NRBC BLD-RTO: 0 /100 WBC
PH UR STRIP.AUTO: 7.5 [PH]
PLATELET # BLD AUTO: 320 K/UL (ref 164–446)
PMV BLD AUTO: 8.5 FL (ref 9–12.9)
POTASSIUM SERPL-SCNC: 3.7 MMOL/L (ref 3.6–5.5)
PROT SERPL-MCNC: 7.7 G/DL (ref 6–8.2)
PROT UR QL STRIP: NEGATIVE MG/DL
RBC # BLD AUTO: 4.77 M/UL (ref 4.2–5.4)
RBC UR QL AUTO: NEGATIVE
SODIUM SERPL-SCNC: 133 MMOL/L (ref 135–145)
SP GR UR STRIP.AUTO: 1
UROBILINOGEN UR STRIP.AUTO-MCNC: 0.2 MG/DL
WBC # BLD AUTO: 9.7 K/UL (ref 4.8–10.8)

## 2019-03-05 PROCEDURE — 99223 1ST HOSP IP/OBS HIGH 75: CPT | Performed by: HOSPITALIST

## 2019-03-05 PROCEDURE — 82962 GLUCOSE BLOOD TEST: CPT

## 2019-03-05 PROCEDURE — 700101 HCHG RX REV CODE 250: Performed by: HOSPITALIST

## 2019-03-05 PROCEDURE — 700111 HCHG RX REV CODE 636 W/ 250 OVERRIDE (IP): Performed by: EMERGENCY MEDICINE

## 2019-03-05 PROCEDURE — 96365 THER/PROPH/DIAG IV INF INIT: CPT

## 2019-03-05 PROCEDURE — 700111 HCHG RX REV CODE 636 W/ 250 OVERRIDE (IP): Performed by: HOSPITALIST

## 2019-03-05 PROCEDURE — 83735 ASSAY OF MAGNESIUM: CPT

## 2019-03-05 PROCEDURE — 96375 TX/PRO/DX INJ NEW DRUG ADDON: CPT

## 2019-03-05 PROCEDURE — A9270 NON-COVERED ITEM OR SERVICE: HCPCS | Performed by: HOSPITALIST

## 2019-03-05 PROCEDURE — 99285 EMERGENCY DEPT VISIT HI MDM: CPT

## 2019-03-05 PROCEDURE — 83605 ASSAY OF LACTIC ACID: CPT

## 2019-03-05 PROCEDURE — 36415 COLL VENOUS BLD VENIPUNCTURE: CPT

## 2019-03-05 PROCEDURE — 700105 HCHG RX REV CODE 258: Performed by: EMERGENCY MEDICINE

## 2019-03-05 PROCEDURE — 84703 CHORIONIC GONADOTROPIN ASSAY: CPT

## 2019-03-05 PROCEDURE — 81003 URINALYSIS AUTO W/O SCOPE: CPT

## 2019-03-05 PROCEDURE — 85025 COMPLETE CBC W/AUTO DIFF WBC: CPT

## 2019-03-05 PROCEDURE — 770006 HCHG ROOM/CARE - MED/SURG/GYN SEMI*

## 2019-03-05 PROCEDURE — 700102 HCHG RX REV CODE 250 W/ 637 OVERRIDE(OP): Performed by: HOSPITALIST

## 2019-03-05 PROCEDURE — 96367 TX/PROPH/DG ADDL SEQ IV INF: CPT

## 2019-03-05 PROCEDURE — 80053 COMPREHEN METABOLIC PANEL: CPT

## 2019-03-05 PROCEDURE — 700117 HCHG RX CONTRAST REV CODE 255: Performed by: EMERGENCY MEDICINE

## 2019-03-05 PROCEDURE — 87040 BLOOD CULTURE FOR BACTERIA: CPT | Mod: 91

## 2019-03-05 PROCEDURE — 74177 CT ABD & PELVIS W/CONTRAST: CPT

## 2019-03-05 PROCEDURE — 96376 TX/PRO/DX INJ SAME DRUG ADON: CPT

## 2019-03-05 PROCEDURE — 83690 ASSAY OF LIPASE: CPT

## 2019-03-05 PROCEDURE — 700101 HCHG RX REV CODE 250: Performed by: EMERGENCY MEDICINE

## 2019-03-05 PROCEDURE — 700105 HCHG RX REV CODE 258: Performed by: HOSPITALIST

## 2019-03-05 RX ORDER — AMOXICILLIN 250 MG
2 CAPSULE ORAL 2 TIMES DAILY
Status: DISCONTINUED | OUTPATIENT
Start: 2019-03-05 | End: 2019-03-08 | Stop reason: HOSPADM

## 2019-03-05 RX ORDER — OXYCODONE HYDROCHLORIDE 5 MG/1
2.5 TABLET ORAL
Status: DISCONTINUED | OUTPATIENT
Start: 2019-03-05 | End: 2019-03-08 | Stop reason: HOSPADM

## 2019-03-05 RX ORDER — DEXTROSE MONOHYDRATE 25 G/50ML
25 INJECTION, SOLUTION INTRAVENOUS
Status: DISCONTINUED | OUTPATIENT
Start: 2019-03-05 | End: 2019-03-08

## 2019-03-05 RX ORDER — MORPHINE SULFATE 4 MG/ML
4 INJECTION, SOLUTION INTRAMUSCULAR; INTRAVENOUS ONCE
Status: COMPLETED | OUTPATIENT
Start: 2019-03-05 | End: 2019-03-05

## 2019-03-05 RX ORDER — ACETAMINOPHEN 325 MG/1
650 TABLET ORAL EVERY 6 HOURS PRN
Status: DISCONTINUED | OUTPATIENT
Start: 2019-03-05 | End: 2019-03-08 | Stop reason: HOSPADM

## 2019-03-05 RX ORDER — POLYETHYLENE GLYCOL 3350 17 G/17G
1 POWDER, FOR SOLUTION ORAL
Status: DISCONTINUED | OUTPATIENT
Start: 2019-03-05 | End: 2019-03-08 | Stop reason: HOSPADM

## 2019-03-05 RX ORDER — DICYCLOMINE HYDROCHLORIDE 10 MG/1
10 CAPSULE ORAL
COMMUNITY
End: 2019-03-05 | Stop reason: CLARIF

## 2019-03-05 RX ORDER — OXYCODONE HYDROCHLORIDE 5 MG/1
5 TABLET ORAL
Status: DISCONTINUED | OUTPATIENT
Start: 2019-03-05 | End: 2019-03-08 | Stop reason: HOSPADM

## 2019-03-05 RX ORDER — METRONIDAZOLE 500 MG/1
500 TABLET ORAL 3 TIMES DAILY
Status: ON HOLD | COMMUNITY
Start: 2019-02-25 | End: 2019-03-08

## 2019-03-05 RX ORDER — SODIUM CHLORIDE 9 MG/ML
INJECTION, SOLUTION INTRAVENOUS CONTINUOUS
Status: DISCONTINUED | OUTPATIENT
Start: 2019-03-05 | End: 2019-03-08 | Stop reason: HOSPADM

## 2019-03-05 RX ORDER — ONDANSETRON 4 MG/1
4 TABLET, ORALLY DISINTEGRATING ORAL EVERY 4 HOURS PRN
Status: DISCONTINUED | OUTPATIENT
Start: 2019-03-05 | End: 2019-03-08 | Stop reason: HOSPADM

## 2019-03-05 RX ORDER — PROMETHAZINE HYDROCHLORIDE 25 MG/1
12.5-25 TABLET ORAL EVERY 4 HOURS PRN
Status: DISCONTINUED | OUTPATIENT
Start: 2019-03-05 | End: 2019-03-08 | Stop reason: HOSPADM

## 2019-03-05 RX ORDER — PROMETHAZINE HYDROCHLORIDE 25 MG/1
12.5-25 SUPPOSITORY RECTAL EVERY 4 HOURS PRN
Status: DISCONTINUED | OUTPATIENT
Start: 2019-03-05 | End: 2019-03-08 | Stop reason: HOSPADM

## 2019-03-05 RX ORDER — SULFAMETHOXAZOLE AND TRIMETHOPRIM 800; 160 MG/1; MG/1
1 TABLET ORAL 2 TIMES DAILY
Status: ON HOLD | COMMUNITY
Start: 2019-02-27 | End: 2019-03-08

## 2019-03-05 RX ORDER — ONDANSETRON 2 MG/ML
4 INJECTION INTRAMUSCULAR; INTRAVENOUS ONCE
Status: COMPLETED | OUTPATIENT
Start: 2019-03-05 | End: 2019-03-05

## 2019-03-05 RX ORDER — ONDANSETRON 2 MG/ML
4 INJECTION INTRAMUSCULAR; INTRAVENOUS EVERY 4 HOURS PRN
Status: DISCONTINUED | OUTPATIENT
Start: 2019-03-05 | End: 2019-03-08 | Stop reason: HOSPADM

## 2019-03-05 RX ORDER — BISACODYL 10 MG
10 SUPPOSITORY, RECTAL RECTAL
Status: DISCONTINUED | OUTPATIENT
Start: 2019-03-05 | End: 2019-03-08 | Stop reason: HOSPADM

## 2019-03-05 RX ORDER — MORPHINE SULFATE 4 MG/ML
2 INJECTION, SOLUTION INTRAMUSCULAR; INTRAVENOUS
Status: DISCONTINUED | OUTPATIENT
Start: 2019-03-05 | End: 2019-03-08 | Stop reason: HOSPADM

## 2019-03-05 RX ADMIN — CEFTRIAXONE SODIUM 2 G: 2 INJECTION, POWDER, FOR SOLUTION INTRAMUSCULAR; INTRAVENOUS at 13:10

## 2019-03-05 RX ADMIN — SODIUM CHLORIDE: 9 INJECTION, SOLUTION INTRAVENOUS at 21:23

## 2019-03-05 RX ADMIN — MORPHINE SULFATE 4 MG: 4 INJECTION INTRAVENOUS at 10:56

## 2019-03-05 RX ADMIN — SODIUM CHLORIDE: 9 INJECTION, SOLUTION INTRAVENOUS at 15:47

## 2019-03-05 RX ADMIN — MORPHINE SULFATE 4 MG: 4 INJECTION INTRAVENOUS at 13:32

## 2019-03-05 RX ADMIN — SENNOSIDES AND DOCUSATE SODIUM 2 TABLET: 8.6; 5 TABLET ORAL at 18:46

## 2019-03-05 RX ADMIN — METRONIDAZOLE 500 MG: 500 INJECTION, SOLUTION INTRAVENOUS at 13:40

## 2019-03-05 RX ADMIN — ONDANSETRON 4 MG: 2 INJECTION INTRAMUSCULAR; INTRAVENOUS at 10:56

## 2019-03-05 RX ADMIN — ENOXAPARIN SODIUM 40 MG: 100 INJECTION SUBCUTANEOUS at 18:46

## 2019-03-05 RX ADMIN — METRONIDAZOLE 500 MG: 500 INJECTION, SOLUTION INTRAVENOUS at 21:17

## 2019-03-05 RX ADMIN — IOHEXOL 100 ML: 350 INJECTION, SOLUTION INTRAVENOUS at 12:27

## 2019-03-05 ASSESSMENT — LIFESTYLE VARIABLES: DO YOU DRINK ALCOHOL: NO

## 2019-03-05 NOTE — ED NOTES
Med rec complete per family at bedside with medication bottles (returned)  Allergies reviewed    Patient is currently on Flagyl and Bactirm

## 2019-03-05 NOTE — ED NOTES
Patient resting on gurney, updated on POC, awaiting admit room assignment; IVF infusing per admit MD order.  Family at bedside.

## 2019-03-05 NOTE — ED PROVIDER NOTES
ED Provider Note    ER PROVIDER NOTE      CHIEF COMPLAINT  Chief Complaint   Patient presents with   • Abdominal Pain     pt reports to have abd pain for 8 days. recent hx of diverticulitis. being seen by GI consultants. recent abx therapy w/o relief.    • Vomiting       HPI  Leti Arana is a 63 y.o. female who presents to the emergency department complaining of lower abdominal pain nausea and vomiting.  Patient reports diagnosis of diverticulitis 2 weeks ago, saw Jony COY, 8 days ago started on Flagyl, Bactrim, ibuprofen and Bentyl.  She reports symptoms have been worsening over the last few days started vomiting yesterday as well.  Left lower quadrant pain, sharp, some radiation into her groin.  She denies any diarrhea or blood in her stool but states she has had some intermittent constipation.  No fevers but has had some chills    REVIEW OF SYSTEMS  Pertinent positives include abdominal pain. Pertinent negatives include no fever. See HPI for details. All other systems reviewed and are negative.    PAST MEDICAL HISTORY   has a past medical history of Diabetes mellitus type II (10/17/2012) and Diverticulitis.    SURGICAL HISTORY   has a past surgical history that includes abdominal hysterectomy total and cholecystectomy.    FAMILY HISTORY  Family History   Problem Relation Age of Onset   • Diabetes Neg Hx    • Hypertension Neg Hx    • Heart Disease Neg Hx    • Cancer Neg Hx        SOCIAL HISTORY  Social History     Social History   • Marital status:      Spouse name: N/A   • Number of children: N/A   • Years of education: N/A     Social History Main Topics   • Smoking status: Never Smoker   • Smokeless tobacco: Never Used   • Alcohol use No   • Drug use: No   • Sexual activity: Not on file     Other Topics Concern   • Not on file     Social History Narrative   • No narrative on file      History   Drug Use No       CURRENT MEDICATIONS  Home Medications     Reviewed by Lacy De Los Santos R.N.  "(Registered Nurse) on 03/05/19 at 1021  Med List Status: Complete   Medication Last Dose Status   dicyclomine (BENTYL) 10 MG Cap 2/27/2019 Active   ibuprofen (MOTRIN) 600 MG Tab 3/4/2019 Active   metformin (GLUCOPHAGE) 500 MG TABS 3/5/2019 Active   metroNIDAZOLE (FLAGYL) 500 MG Tab 3/5/2019 Active   ondansetron (ZOFRAN ODT) 4 MG TABLET DISPERSIBLE PRN Active   sulfamethoxazole-trimethoprim (BACTRIM DS) 800-160 MG tablet 3/5/2019 Active                ALLERGIES  No Known Allergies    PHYSICAL EXAM  VITAL SIGNS: /82   Pulse 77   Temp 36.8 °C (98.2 °F) (Temporal)   Resp 14   Ht 1.626 m (5' 4\")   Wt 81.6 kg (180 lb)   SpO2 92%   BMI 30.90 kg/m²   Pulse ox interpretation: I interpret this pulse ox as normal.    Constitutional: Alert uncomfortable  HENT: No signs of trauma, Bilateral external ears normal, Nose normal.   Eyes: Pupils are equal and reactive, Conjunctiva normal, Non-icteric.   Neck: Normal range of motion, No tenderness, Supple, No stridor.   Lymphatic: No lymphadenopathy noted.   Cardiovascular: Regular rate and rhythm, no murmurs.   Thorax & Lungs: Normal breath sounds, No respiratory distress, No wheezing, No chest tenderness.   Abdomen: Bowel sounds normal, Soft, moderate left lower quadrant tenderness, No masses, No pulsatile masses. No peritoneal signs.  Skin: Warm, Dry, No erythema, No rash.   Back: No bony tenderness, No CVA tenderness.   Extremities: Intact distal pulses, No edema, No tenderness, No cyanosis, Negative Aliya's sign.  Musculoskeletal: Good range of motion in all major joints. No tenderness to palpation or major deformities noted.   Neurologic: Alert , Normal motor function, Normal sensory function, No focal deficits noted.   Psychiatric: Affect normal, Judgment normal, Mood normal.     DIAGNOSTIC STUDIES / PROCEDURES        LABS  Labs Reviewed   CBC WITH DIFFERENTIAL - Abnormal; Notable for the following:        Result Value    MPV 8.5 (*)     All other components within " "normal limits   COMP METABOLIC PANEL - Abnormal; Notable for the following:     Sodium 133 (*)     Glucose 127 (*)     Bun 5 (*)     All other components within normal limits   URINALYSIS,CULTURE IF INDICATED - Abnormal; Notable for the following:     Ketones Trace (*)     All other components within normal limits   LIPASE   HCG QUAL SERUM   BLOOD CULTURE    Narrative:     Per Hospital Policy: Only change Specimen Src: to \"Line\" if  specified by physician order.   BLOOD CULTURE    Narrative:     Per Hospital Policy: Only change Specimen Src: to \"Line\" if  specified by physician order.   LACTIC ACID   ESTIMATED GFR       All labs reviewed by me.    RADIOLOGY  CT-ABDOMEN-PELVIS WITH   Final Result      Minimal inflammatory changes adjacent to the distal descending colon can be seen in the setting of diverticulitis.      No microperforation or abscess is identified.      Intra and extrahepatic biliary ductal dilatation. Correlation with LFTs is recommended. This may be related to ectasia in the setting of prior cholecystectomy.           The radiologist's interpretation of all radiological studies have been reviewed by me.    COURSE & MEDICAL DECISION MAKING  Nursing notes, VS, PMSFHx reviewed in chart.    10:29 AM Patient seen and examined at bedside. Patient will be treated with morphine, Zofran. Ordered for labs, CT to evaluate her symptoms.     Patient reevaluated, states pain is returning, additional medications ordered will plan for admission    Discussed case with  Dr La Gonzalez for admission    Decision Making:  This is a 63 y.o. female presenting with worsening abdominal pain in the setting of known diverticulitis.  She appears to have failed outpatient antibiotics at this time will be admitted for further care and IV antibiotics.  She does not appear septic with no leukocytosis or significant lactic acidosis.  CT was additionally obtained with her worsening symptoms and there is no evidence of abscess " formation or perforation or other acute intra-abdominal pathology    Patient admitted in stable condition    FINAL IMPRESSION  1. Diverticulitis    2. Abdominal pain, unspecified abdominal location      The note accurately reflects work and decisions made by me.  Isai Shepard  3/5/2019  1:12 PM

## 2019-03-05 NOTE — ED NOTES
Pt brought back to rm BL 18 from triage by WC. Pt able to transfer self to bed, pt in gown, on monitor, family at bedside, call light in reach. Chart up for ERP.

## 2019-03-05 NOTE — ED TRIAGE NOTES
Chief Complaint   Patient presents with   • Abdominal Pain     pt reports to have abd pain for 8 days. recent hx of diverticulitis. being seen by GI consultants. recent abx therapy w/o relief.    • Vomiting      [id number] Iby used for triage.   Explained to pt triage process, made pt aware to tell this RN/staff of any changes/concerns, pt verbalized understanding of process and instructions given. Pt to ER lobby.

## 2019-03-06 LAB
ALBUMIN SERPL BCP-MCNC: 3.2 G/DL (ref 3.2–4.9)
ALBUMIN/GLOB SERPL: 1.2 G/DL
ALP SERPL-CCNC: 52 U/L (ref 30–99)
ALT SERPL-CCNC: 28 U/L (ref 2–50)
ANION GAP SERPL CALC-SCNC: 6 MMOL/L (ref 0–11.9)
AST SERPL-CCNC: 29 U/L (ref 12–45)
BASOPHILS # BLD AUTO: 0.8 % (ref 0–1.8)
BASOPHILS # BLD: 0.05 K/UL (ref 0–0.12)
BILIRUB SERPL-MCNC: 0.4 MG/DL (ref 0.1–1.5)
BUN SERPL-MCNC: 5 MG/DL (ref 8–22)
CALCIUM SERPL-MCNC: 8.3 MG/DL (ref 8.5–10.5)
CHLORIDE SERPL-SCNC: 110 MMOL/L (ref 96–112)
CO2 SERPL-SCNC: 25 MMOL/L (ref 20–33)
CREAT SERPL-MCNC: 0.63 MG/DL (ref 0.5–1.4)
EOSINOPHIL # BLD AUTO: 0.13 K/UL (ref 0–0.51)
EOSINOPHIL NFR BLD: 2.1 % (ref 0–6.9)
ERYTHROCYTE [DISTWIDTH] IN BLOOD BY AUTOMATED COUNT: 41.8 FL (ref 35.9–50)
GLOBULIN SER CALC-MCNC: 2.7 G/DL (ref 1.9–3.5)
GLUCOSE BLD-MCNC: 83 MG/DL (ref 65–99)
GLUCOSE BLD-MCNC: 89 MG/DL (ref 65–99)
GLUCOSE BLD-MCNC: 92 MG/DL (ref 65–99)
GLUCOSE BLD-MCNC: 93 MG/DL (ref 65–99)
GLUCOSE SERPL-MCNC: 98 MG/DL (ref 65–99)
HCT VFR BLD AUTO: 38 % (ref 37–47)
HGB BLD-MCNC: 12.4 G/DL (ref 12–16)
IMM GRANULOCYTES # BLD AUTO: 0.02 K/UL (ref 0–0.11)
IMM GRANULOCYTES NFR BLD AUTO: 0.3 % (ref 0–0.9)
LYMPHOCYTES # BLD AUTO: 3.28 K/UL (ref 1–4.8)
LYMPHOCYTES NFR BLD: 52.3 % (ref 22–41)
MCH RBC QN AUTO: 30.3 PG (ref 27–33)
MCHC RBC AUTO-ENTMCNC: 32.6 G/DL (ref 33.6–35)
MCV RBC AUTO: 92.9 FL (ref 81.4–97.8)
MONOCYTES # BLD AUTO: 0.5 K/UL (ref 0–0.85)
MONOCYTES NFR BLD AUTO: 8 % (ref 0–13.4)
NEUTROPHILS # BLD AUTO: 2.29 K/UL (ref 2–7.15)
NEUTROPHILS NFR BLD: 36.5 % (ref 44–72)
NRBC # BLD AUTO: 0 K/UL
NRBC BLD-RTO: 0 /100 WBC
PLATELET # BLD AUTO: 227 K/UL (ref 164–446)
PMV BLD AUTO: 8.7 FL (ref 9–12.9)
POTASSIUM SERPL-SCNC: 3.8 MMOL/L (ref 3.6–5.5)
PROT SERPL-MCNC: 5.9 G/DL (ref 6–8.2)
RBC # BLD AUTO: 4.09 M/UL (ref 4.2–5.4)
SODIUM SERPL-SCNC: 141 MMOL/L (ref 135–145)
WBC # BLD AUTO: 6.3 K/UL (ref 4.8–10.8)

## 2019-03-06 PROCEDURE — 82962 GLUCOSE BLOOD TEST: CPT | Mod: 91

## 2019-03-06 PROCEDURE — 700111 HCHG RX REV CODE 636 W/ 250 OVERRIDE (IP): Performed by: HOSPITALIST

## 2019-03-06 PROCEDURE — 700102 HCHG RX REV CODE 250 W/ 637 OVERRIDE(OP): Performed by: INTERNAL MEDICINE

## 2019-03-06 PROCEDURE — 85025 COMPLETE CBC W/AUTO DIFF WBC: CPT

## 2019-03-06 PROCEDURE — 770006 HCHG ROOM/CARE - MED/SURG/GYN SEMI*

## 2019-03-06 PROCEDURE — 700102 HCHG RX REV CODE 250 W/ 637 OVERRIDE(OP): Performed by: HOSPITALIST

## 2019-03-06 PROCEDURE — 80053 COMPREHEN METABOLIC PANEL: CPT

## 2019-03-06 PROCEDURE — 700105 HCHG RX REV CODE 258: Performed by: HOSPITALIST

## 2019-03-06 PROCEDURE — 700105 HCHG RX REV CODE 258: Performed by: INTERNAL MEDICINE

## 2019-03-06 PROCEDURE — 36415 COLL VENOUS BLD VENIPUNCTURE: CPT

## 2019-03-06 PROCEDURE — A9270 NON-COVERED ITEM OR SERVICE: HCPCS | Performed by: INTERNAL MEDICINE

## 2019-03-06 PROCEDURE — A9270 NON-COVERED ITEM OR SERVICE: HCPCS | Performed by: HOSPITALIST

## 2019-03-06 PROCEDURE — 99233 SBSQ HOSP IP/OBS HIGH 50: CPT | Performed by: INTERNAL MEDICINE

## 2019-03-06 PROCEDURE — 700101 HCHG RX REV CODE 250: Performed by: HOSPITALIST

## 2019-03-06 RX ORDER — METRONIDAZOLE 500 MG/1
500 TABLET ORAL EVERY 8 HOURS
Status: DISCONTINUED | OUTPATIENT
Start: 2019-03-06 | End: 2019-03-08 | Stop reason: HOSPADM

## 2019-03-06 RX ADMIN — OXYCODONE HYDROCHLORIDE 5 MG: 5 TABLET ORAL at 21:08

## 2019-03-06 RX ADMIN — SENNOSIDES AND DOCUSATE SODIUM 2 TABLET: 8.6; 5 TABLET ORAL at 05:55

## 2019-03-06 RX ADMIN — ACETAMINOPHEN 650 MG: 325 TABLET, FILM COATED ORAL at 05:55

## 2019-03-06 RX ADMIN — METRONIDAZOLE 500 MG: 500 INJECTION, SOLUTION INTRAVENOUS at 05:55

## 2019-03-06 RX ADMIN — SODIUM CHLORIDE: 9 INJECTION, SOLUTION INTRAVENOUS at 14:40

## 2019-03-06 RX ADMIN — METRONIDAZOLE 500 MG: 500 TABLET ORAL at 21:08

## 2019-03-06 RX ADMIN — CEFTRIAXONE SODIUM 2 G: 2 INJECTION, POWDER, FOR SOLUTION INTRAMUSCULAR; INTRAVENOUS at 07:53

## 2019-03-06 RX ADMIN — METRONIDAZOLE 500 MG: 500 INJECTION, SOLUTION INTRAVENOUS at 14:40

## 2019-03-06 RX ADMIN — ACETAMINOPHEN 650 MG: 325 TABLET, FILM COATED ORAL at 13:23

## 2019-03-06 RX ADMIN — OXYCODONE HYDROCHLORIDE 2.5 MG: 5 TABLET ORAL at 17:12

## 2019-03-06 RX ADMIN — SODIUM CHLORIDE: 9 INJECTION, SOLUTION INTRAVENOUS at 05:54

## 2019-03-06 ASSESSMENT — ENCOUNTER SYMPTOMS
DIZZINESS: 0
SPUTUM PRODUCTION: 0
HEARTBURN: 0
SPEECH CHANGE: 0
WEAKNESS: 0
WHEEZING: 0
SHORTNESS OF BREATH: 0
SEIZURES: 0
DEPRESSION: 0
FALLS: 0
NAUSEA: 1
BACK PAIN: 0
TREMORS: 0
COUGH: 0
BLURRED VISION: 0
CHILLS: 0
FEVER: 0
VOMITING: 1
CONSTIPATION: 0
HEADACHES: 0
NECK PAIN: 0
PND: 0
WEIGHT LOSS: 0
EYE PAIN: 0
ABDOMINAL PAIN: 1
DIARRHEA: 0
PALPITATIONS: 0

## 2019-03-06 ASSESSMENT — COGNITIVE AND FUNCTIONAL STATUS - GENERAL
SUGGESTED CMS G CODE MODIFIER MOBILITY: CH
DAILY ACTIVITIY SCORE: 24
SUGGESTED CMS G CODE MODIFIER DAILY ACTIVITY: CH
MOBILITY SCORE: 24

## 2019-03-06 ASSESSMENT — PATIENT HEALTH QUESTIONNAIRE - PHQ9
SUM OF ALL RESPONSES TO PHQ9 QUESTIONS 1 AND 2: 0
1. LITTLE INTEREST OR PLEASURE IN DOING THINGS: NOT AT ALL
2. FEELING DOWN, DEPRESSED, IRRITABLE, OR HOPELESS: NOT AT ALL

## 2019-03-06 ASSESSMENT — LIFESTYLE VARIABLES: SUBSTANCE_ABUSE: 0

## 2019-03-06 NOTE — CARE PLAN
Problem: Communication  Goal: The ability to communicate needs accurately and effectively will improve  Outcome: PROGRESSING AS EXPECTED  Pt pleasant and communicates effectively in Lao.    Problem: Discharge Barriers/Planning  Goal: Patient's continuum of care needs will be met  Outcome: PROGRESSING AS EXPECTED  Pt receiving IVF, IV abx, pain management as needed, and diet increasing as tolerated.

## 2019-03-06 NOTE — ASSESSMENT & PLAN NOTE
Patient has diabetes type 2, without long-term use of insulin, without complications  Hold patient home dose metformin.  Start patient on insulin sliding scale.  Gradually advance diet  Close monitor blood sugar.  Currently controlled, hypoglycemic protocol in place.

## 2019-03-06 NOTE — PROGRESS NOTES
Pt resting in bed this AM. Pt stating she is okay with the Tylenol she was given by night nurse; next one available at 1200. Attempting to titrate off 2L O2; placed on RA and plan to recheck shortly. Pt states baseline is RA. IV running IV Rocephin. HR 57, asymptomatic. No other needs verbalized at this time. Plan to complete admit profile this shift with .       Safety: Call light in reach, fall precautions in place.

## 2019-03-06 NOTE — ASSESSMENT & PLAN NOTE
Uncomplicated however failed outpatient antibiotics.  I Continue patient IV Rocephin and Flagyl.  Patient currently pain better controlled, CT abdomen did not show significant signs of perforation or abscess.  Slightly advance diet to GI soft diabetic  If patient tolerated can consider outpatient follow-up  Patient has been in the hospital for 2 times last year and according to outpatient GI follow-up patient supposed to have colonoscopy soon.  I Continue IV fluid

## 2019-03-06 NOTE — CARE PLAN
Problem: Safety  Goal: Will remain free from falls  Safety precautions in place. Bed in low position, treaded socks on, personal possessions in reach, call light in reach and hourly rounding in place.     Problem: Infection  Goal: Will remain free from infection  Pt receiving IV abx Rocephin and Flagyl. Afebrile.        (4) no limitation

## 2019-03-06 NOTE — H&P
DATE OF ADMISSION:  03/05/2019    DATE OF SERVICE:  03/05/2019    IDENTIFICATION:  This is a 63-year-old female.    PRIMARY CARE PHYSICIAN:  Armond Quevedo MD    CHIEF COMPLAINT:  Abdominal pain.    HISTORY OF PRESENT ILLNESS:  This is a 63-year-old female with a history of   diverticulitis episode.  She apparently was diagnosed again approximately 8   days ago and the patient was placed on empiric antibiotics.  She was referred   to [facility] where the patient was seen by APN by the name of Jony.  The   patient comes back with nausea, vomiting since last night and increase of   left-sided abdominal pain.  The patient had several episodes of diverticulitis   since last year, apparently 5 episode in 2018 where she was hospitalized once   for IV antibiotics and this year, also complains 5 times already.  The   patient has had some soft stools.  She denies any bleeding.  She has had some   mild fevers and chills and left-sided abdominal pain.  She was recently placed   on Flagyl and Bactrim without significant improvement.  The patient presents   with this to the emergency room and was found to have by CT again colon and   sigmoid inflammation without abscess.  The patient to be admitted for   conservative care, IV antibiotics.  The daughter is at the bedside and helps   with history taking.    ALLERGIES:  None.    PRESCRIPTION MEDICATIONS:  Include:  1.  Motrin 600 mg q.6 hours p.r.n.  2.  Glucose 500 mg p.o. daily.  3.  Flagyl 500 mg p.o. t.i.d.  4.  Bactrim double strength 1 tablet b.i.d.    PAST MEDICAL HISTORY:  1.  Diabetes type 2.  2.  Diverticulitis episodes.  3.  History of dietary obesity.    PAST SURGICAL HISTORY:  1.  History of hysterectomy and oophorectomy.  2.  History of cholecystectomy.    SOCIAL HISTORY:  Patient is a nonsmoker, nondrinker.  She denies drug use.    She is here with her daughter currently.    FAMILY HISTORY:  Positive for diabetes.    REVIEW OF SYSTEMS:  Denies any fevers, but  chills on and off.  HEENT:  No headache, no visual changes.  CARDIOVASCULAR:  Without chest pain, palpitation, PND or orthopnea.  LUNGS:  Without cough or sputum production.  GASTROINTESTINAL:  Nausea, vomiting as well as abdominal pain as outlined, no   hematemesis or rectal bleeding.  GENITOURINARY:  No dysuria or hematuria.  MUSCULOSKELETAL:  Without back pain or joint pain.  NEUROLOGIC:  Without focal weakness, numbness or tingling.  SKIN:  Without lesions.    PHYSICAL EXAMINATION:  VITAL SIGNS:  The patient is found to have temperature of 36.8, pulse 80,   respiration 18, blood pressure 129/82, the patient is saturating well on room   air.  GENERAL:  This is a pleasant  female in no acute distress, no acute   respiratory distress.  Well developed, well nourished.  HEENT:  Normocephalic, atraumatic.  EOMI.  PERRLA.  Mucous membranes are   moist.  Nasopharynx otherwise clear.  NECK:  Stiff range of motion.  No lymphadenopathy or thyromegaly.  CHEST:  Normal bony structures.  LUNGS:  Clear to auscultation anteriorly.  HEART:  Regular rate.  S1 and S2 are normal.  No S3, S4.  No wheezing or   rales.  ABDOMEN:  Protuberant.  There is diffuse tenderness, more so in the left upper   and left lower quadrant with some point tenderness there, but no rebound.    There is some mild guarding.  GENITOURINARY:  Normal external female genitalia.  RECTAL:  Deferred.  MUSCULOSKELETAL:  No clubbing, cyanosis or edema.  NEUROLOGIC:  The patient is alert and oriented x4.  Cranial nerves II-XII are   grossly intact.  No sensory loss is elicited.    LABORATORY DATA AND IMAGING:  CBC essentially normal.  Chemistry abnormal for   a sodium of 133, a glucose of 127, a BUN of 5.  Her other parameters normal   including a lactic acid.  Her urinalysis is negative.  CT of the abdomen and   pelvis is reported as minimal inflammatory changes adjacent to the distal   descending colon can be seen in the setting of diverticulitis, no    microperforation or abscess, intra and extrahepatic biliary ductal dilatation,   correlation with LFTs recommended, this may be related to ectasia in the   setting of prior cholecystectomy.    ASSESSMENT AND PLAN:  1.  Abdominal pain likely secondary to diverticulitis.  This is recurrent.  No   major structural abnormalities seen on CT.  The patient failed outpatient   p.o. antibiotic therapy, at this time admitted for IV antibiotics as well as   hydration and symptom control.  Certainly, the patient had been referred to   gastroenterology.  A consideration is for a surgical evaluation as an   outpatient for partial colectomy since the patient has had multiple   recurrences of diverticulitis in this setting.  2.  Abdominal pain related to the above condition.  3.  Diabetes type 2.  We will hold her metformin at this time; however, the   patient with sliding scale insulin and adjust as indicated.  4.  History of dietary obesity.  5.  Mild hyponatremia.  Will be corrected with IV fluids.    OVERALL PLAN:  The patient at this time is admitted for inpatient stay with IV   antibiotics, symptom control for her recurrent diverticulitis failing   outpatient therapy.  Patient at this time will likely require a 2+ overnight   stay in the hospital, completing her IV antibiotics and await symptomatic   improvement.  Again, consideration would be to have the patient referred to   outpatient for a surgical evaluation in light of her recurrent diverticulitis   episode.       ____________________________________     MD REJI PIKE / NTS    DD:  03/05/2019 17:29:12  DT:  03/05/2019 19:22:10    D#:  [id number]  Job#:  [id number]

## 2019-03-06 NOTE — PROGRESS NOTES
· 2 RN skin check complete with SOFIA Lemus.  · Devices in place: Right AC PIV.  · Skin assessed under devices: yes.  · Confirmed pressure ulcers found: none.  · New potential pressure ulcers noted: n/a. Wound consult placed and wound reported: n/a.  · Old, healed abdominal scar. Heels pink/blanching. Sacrum pink/blanching. Rest of skin intact, no open areas noted.

## 2019-03-06 NOTE — PROGRESS NOTES
[facility] Medicine Daily Progress Note    Date of Service  3/6/2019    Chief Complaint  63 y.o. female admitted 3/5/2019 with abdominal pain    Hospital Course    Past medical history of diabetes presented with complaint of abdominal pain.  Patient was noticed to have diverticulitis and failed outpatient antibiotics.  Patient was admitted with IV Rocephin and Flagyl.  Patient was treated with IV fluid rehydration and supportive care and pain control.  Patient symptoms start improving.      Interval Problem Update  3/6.  Patient still complains of left lower quadrant abd pain. Patient's pain is local , 4-5/10, intermittent and does not radiate to other location, sharp and with some tingling. Can be controlled by pain meds. Dressing in place. Patient otherwise denies fever, chills, SOB, CP, headache, constipation, diarrhea, cough, or sputum.    Consultants/Specialty  none    Code Status  full    Disposition  TND    Review of Systems  Review of Systems   Constitutional: Negative for chills, fever and weight loss.   HENT: Negative for congestion, ear discharge, ear pain, hearing loss and nosebleeds.    Eyes: Negative for blurred vision and pain.   Respiratory: Negative for cough, sputum production, shortness of breath and wheezing.    Cardiovascular: Negative for chest pain, palpitations, leg swelling and PND.   Gastrointestinal: Positive for abdominal pain, nausea and vomiting. Negative for constipation, diarrhea and heartburn.   Genitourinary: Negative for dysuria, frequency and hematuria.   Musculoskeletal: Negative for back pain, falls, joint pain and neck pain.   Skin: Negative for rash.   Neurological: Negative for dizziness, tremors, speech change, seizures, weakness and headaches.   Psychiatric/Behavioral: Negative for depression, substance abuse and suicidal ideas.        Physical Exam  Temp:  [36.2 °C (97.2 °F)-36.6 °C (97.8 °F)] 36.4 °C (97.5 °F)  Pulse:  [57-84] 57  Resp:  [16-18] 16  BP: ()/(45-55)  106/52  SpO2:  [92 %-100 %] 97 %    Physical Exam   Constitutional: She is oriented to person, place, and time. She appears well-developed and well-nourished.   HENT:   Head: Normocephalic.   Nose: Nose normal.   Mouth/Throat: No oropharyngeal exudate.   Eyes: Pupils are equal, round, and reactive to light. EOM are normal.   Neck: Normal range of motion. Neck supple. No JVD present. No thyromegaly present.   Cardiovascular: Normal rate, regular rhythm and normal heart sounds.  Exam reveals no gallop and no friction rub.    No murmur heard.  Pulmonary/Chest: Effort normal and breath sounds normal. No respiratory distress. She has no wheezes. She has no rales.   Abdominal: Soft. Bowel sounds are normal. She exhibits no distension and no mass. There is tenderness. There is no rebound and no guarding.   Musculoskeletal: Normal range of motion. She exhibits no edema or tenderness.   Lymphadenopathy:     She has no cervical adenopathy.   Neurological: She is alert and oriented to person, place, and time. No cranial nerve deficit.   Skin: Skin is warm. No rash noted.   Psychiatric: Her behavior is normal.       Fluids    Intake/Output Summary (Last 24 hours) at 03/06/19 1248  Last data filed at 03/06/19 1000   Gross per 24 hour   Intake             2160 ml   Output                0 ml   Net             2160 ml       Laboratory  Recent Labs      03/05/19   0958  03/06/19   0254   WBC  9.7  6.3   RBC  4.77  4.09*   HEMOGLOBIN  14.3  12.4   HEMATOCRIT  42.1  38.0   MCV  88.3  92.9   MCH  30.0  30.3   MCHC  34.0  32.6*   RDW  38.9  41.8   PLATELETCT  320  227   MPV  8.5*  8.7*     Recent Labs      03/05/19   0958  03/06/19   0254   SODIUM  133*  141   POTASSIUM  3.7  3.8   CHLORIDE  100  110   CO2  23  25   GLUCOSE  127*  98   BUN  5*  5*   CREATININE  0.71  0.63   CALCIUM  9.5  8.3*                   Imaging  CT-ABDOMEN-PELVIS WITH   Final Result      Minimal inflammatory changes adjacent to the distal descending colon can be  seen in the setting of diverticulitis.      No microperforation or abscess is identified.      Intra and extrahepatic biliary ductal dilatation. Correlation with LFTs is recommended. This may be related to ectasia in the setting of prior cholecystectomy.              Assessment/Plan  * Diverticulitis- (present on admission)   Assessment & Plan    Uncomplicated however failed outpatient antibiotics.  I Continue patient IV Rocephin and Flagyl.  Patient currently pain better controlled, CT abdomen did not show significant signs of perforation or abscess.  Slightly advance diet to GI soft diabetic  If patient tolerated can consider outpatient follow-up  Patient has been in the hospital for 2 times last year and according to outpatient GI follow-up patient supposed to have colonoscopy soon.  I Continue IV fluid     Left lower quadrant pain- (present on admission)   Assessment & Plan    Likely from diverticulitis  Close monitor pain control     Obesity (BMI 30-39.9)- (present on admission)   Assessment & Plan    Body mass index is 30.9 kg/m².    Weight reduction education provided patient agreed     Type 2 diabetes mellitus without complication, without long-term current use of insulin ([facility])- (present on admission)   Assessment & Plan    Patient has diabetes type 2, without long-term use of insulin, without complications  Hold patient home dose metformin.  Start patient on insulin sliding scale.  Gradually advance diet  Close monitor blood sugar.  Currently controlled, hypoglycemic protocol in place.          VTE prophylaxis: SCD and Lovenox      Current Facility-Administered Medications:   •  senna-docusate (PERICOLACE or SENOKOT S) 8.6-50 MG per tablet 2 Tab, 2 Tab, Oral, BID, 2 Tab at 03/06/19 0555 **AND** polyethylene glycol/lytes (MIRALAX) PACKET 1 Packet, 1 Packet, Oral, QDAY PRN **AND** magnesium hydroxide (MILK OF MAGNESIA) suspension 30 mL, 30 mL, Oral, QDAY PRN **AND** bisacodyl (DULCOLAX) suppository 10 mg, 10  mg, Rectal, QDAY PRN, Prashant Vizcaino M.D.  •  NS infusion, , Intravenous, Continuous, Orquidea Cobb M.D., Last Rate: 150 mL/hr at 03/06/19 0554  •  enoxaparin (LOVENOX) inj 40 mg, 40 mg, Subcutaneous, DAILY, Prashant Vizcaino M.D., 40 mg at 03/05/19 1846  •  acetaminophen (TYLENOL) tablet 650 mg, 650 mg, Oral, Q6HRS PRN, Prashant Vizcaino M.D., 650 mg at 03/06/19 0555  •  Notify provider if pain remains uncontrolled, , , CONTINUOUS **AND** Use the numeric rating scale (NRS-11) on regular floors and Critical-Care Pain Observation Tool (CPOT) on ICUs/Trauma to assess pain, , , CONTINUOUS **AND** Pulse Ox (Oximetry), , , CONTINUOUS **AND** Pharmacy Consult Request ...Pain Management Review 1 Each, 1 Each, Other, PHARMACY TO DOSE **AND** If patient difficult to arouse and/or has respiratory depression, stop any opiates that are currently infusing and call a Rapid Response., , , CONTINUOUS **AND** oxyCODONE immediate-release (ROXICODONE) tablet 2.5 mg, 2.5 mg, Oral, Q3HRS PRN **AND** oxyCODONE immediate-release (ROXICODONE) tablet 5 mg, 5 mg, Oral, Q3HRS PRN **AND** morphine (pf) 4 mg/ml injection 2 mg, 2 mg, Intravenous, Q3HRS PRN, Prashant Vizcaino M.D.  •  ondansetron (ZOFRAN) syringe/vial injection 4 mg, 4 mg, Intravenous, Q4HRS PRN, Prashant Vizcaino M.D.  •  ondansetron (ZOFRAN ODT) dispertab 4 mg, 4 mg, Oral, Q4HRS PRN, Prashant Vizcaino M.D.  •  promethazine (PHENERGAN) tablet 12.5-25 mg, 12.5-25 mg, Oral, Q4HRS PRN, Prashant Vizcaino M.D.  •  promethazine (PHENERGAN) suppository 12.5-25 mg, 12.5-25 mg, Rectal, Q4HRS PRN, Prashant Vizcaino M.D.  •  prochlorperazine (COMPAZINE) injection 5-10 mg, 5-10 mg, Intravenous, Q4HRS PRN, Prashant Vizcaino M.D.  •  insulin regular (HUMULIN R) injection 2-9 Units, 2-9 Units, Subcutaneous, Q6HRS, Stopped at 03/05/19 1800 **AND** Accu-Chek Q6 if NPO, , , Q6H **AND** NOTIFY MD and PharmD, , , Once **AND** glucose 4 g chewable tablet 16 g, 16 g, Oral, Q15 MIN  PRN **AND** dextrose 50% (D50W) injection 25 mL, 25 mL, Intravenous, Q15 MIN PRN, Prashant Vizcaino M.D.  •  metroNIDAZOLE (FLAGYL) IVPB 500 mg, 500 mg, Intravenous, Q8HRS, Prashant Vizcaino M.D., Stopped at 03/06/19 0655  •  cefTRIAXone (ROCEPHIN) 2 g in  mL IVPB, 2 g, Intravenous, Q24HRS, Prashant Vizcaino M.D., Stopped at 03/06/19 0845

## 2019-03-06 NOTE — PROGRESS NOTES
Pt AOX4, denies pain at this time. Pt is Monegasque speaking only, sister at bedside. Pt very fatigued, requested to do admit profile in AM. IV abx flagyl administered, no rxn noted. Pt resting quietly in bed, needs met. Call light within reach, personal belongings available, bed in lowest position, treaded socks on, and hourly rounding in place.

## 2019-03-06 NOTE — ED NOTES
Patient transported to [facility] via gurney, all personal belongings at bedside.

## 2019-03-07 LAB
ANION GAP SERPL CALC-SCNC: 7 MMOL/L (ref 0–11.9)
BASOPHILS # BLD AUTO: 0.6 % (ref 0–1.8)
BASOPHILS # BLD: 0.04 K/UL (ref 0–0.12)
BUN SERPL-MCNC: 5 MG/DL (ref 8–22)
CALCIUM SERPL-MCNC: 8.7 MG/DL (ref 8.5–10.5)
CHLORIDE SERPL-SCNC: 108 MMOL/L (ref 96–112)
CO2 SERPL-SCNC: 28 MMOL/L (ref 20–33)
CREAT SERPL-MCNC: 0.61 MG/DL (ref 0.5–1.4)
EOSINOPHIL # BLD AUTO: 0.2 K/UL (ref 0–0.51)
EOSINOPHIL NFR BLD: 2.8 % (ref 0–6.9)
ERYTHROCYTE [DISTWIDTH] IN BLOOD BY AUTOMATED COUNT: 40.2 FL (ref 35.9–50)
GLUCOSE BLD-MCNC: 111 MG/DL (ref 65–99)
GLUCOSE SERPL-MCNC: 99 MG/DL (ref 65–99)
HCT VFR BLD AUTO: 39.6 % (ref 37–47)
HGB BLD-MCNC: 12.9 G/DL (ref 12–16)
IMM GRANULOCYTES # BLD AUTO: 0.02 K/UL (ref 0–0.11)
IMM GRANULOCYTES NFR BLD AUTO: 0.3 % (ref 0–0.9)
LYMPHOCYTES # BLD AUTO: 3.27 K/UL (ref 1–4.8)
LYMPHOCYTES NFR BLD: 46.3 % (ref 22–41)
MCH RBC QN AUTO: 29.9 PG (ref 27–33)
MCHC RBC AUTO-ENTMCNC: 32.6 G/DL (ref 33.6–35)
MCV RBC AUTO: 91.7 FL (ref 81.4–97.8)
MONOCYTES # BLD AUTO: 0.53 K/UL (ref 0–0.85)
MONOCYTES NFR BLD AUTO: 7.5 % (ref 0–13.4)
NEUTROPHILS # BLD AUTO: 3.01 K/UL (ref 2–7.15)
NEUTROPHILS NFR BLD: 42.5 % (ref 44–72)
NRBC # BLD AUTO: 0 K/UL
NRBC BLD-RTO: 0 /100 WBC
PLATELET # BLD AUTO: 253 K/UL (ref 164–446)
PMV BLD AUTO: 8.8 FL (ref 9–12.9)
POTASSIUM SERPL-SCNC: 3.5 MMOL/L (ref 3.6–5.5)
RBC # BLD AUTO: 4.32 M/UL (ref 4.2–5.4)
SODIUM SERPL-SCNC: 143 MMOL/L (ref 135–145)
WBC # BLD AUTO: 7.1 K/UL (ref 4.8–10.8)

## 2019-03-07 PROCEDURE — 700105 HCHG RX REV CODE 258: Performed by: INTERNAL MEDICINE

## 2019-03-07 PROCEDURE — 700111 HCHG RX REV CODE 636 W/ 250 OVERRIDE (IP): Performed by: HOSPITALIST

## 2019-03-07 PROCEDURE — 700111 HCHG RX REV CODE 636 W/ 250 OVERRIDE (IP): Performed by: INTERNAL MEDICINE

## 2019-03-07 PROCEDURE — 80048 BASIC METABOLIC PNL TOTAL CA: CPT

## 2019-03-07 PROCEDURE — 770006 HCHG ROOM/CARE - MED/SURG/GYN SEMI*

## 2019-03-07 PROCEDURE — A9270 NON-COVERED ITEM OR SERVICE: HCPCS | Performed by: HOSPITALIST

## 2019-03-07 PROCEDURE — 82962 GLUCOSE BLOOD TEST: CPT

## 2019-03-07 PROCEDURE — 700102 HCHG RX REV CODE 250 W/ 637 OVERRIDE(OP): Performed by: INTERNAL MEDICINE

## 2019-03-07 PROCEDURE — 99232 SBSQ HOSP IP/OBS MODERATE 35: CPT | Performed by: INTERNAL MEDICINE

## 2019-03-07 PROCEDURE — 36415 COLL VENOUS BLD VENIPUNCTURE: CPT

## 2019-03-07 PROCEDURE — 700102 HCHG RX REV CODE 250 W/ 637 OVERRIDE(OP): Performed by: HOSPITALIST

## 2019-03-07 PROCEDURE — A9270 NON-COVERED ITEM OR SERVICE: HCPCS | Performed by: INTERNAL MEDICINE

## 2019-03-07 PROCEDURE — 85025 COMPLETE CBC W/AUTO DIFF WBC: CPT

## 2019-03-07 RX ORDER — POTASSIUM CHLORIDE 20 MEQ/1
40 TABLET, EXTENDED RELEASE ORAL ONCE
Status: COMPLETED | OUTPATIENT
Start: 2019-03-07 | End: 2019-03-07

## 2019-03-07 RX ADMIN — SENNOSIDES AND DOCUSATE SODIUM 2 TABLET: 8.6; 5 TABLET ORAL at 17:27

## 2019-03-07 RX ADMIN — CEFTRIAXONE SODIUM 2 G: 2 INJECTION, POWDER, FOR SOLUTION INTRAMUSCULAR; INTRAVENOUS at 04:59

## 2019-03-07 RX ADMIN — METRONIDAZOLE 500 MG: 500 TABLET ORAL at 14:29

## 2019-03-07 RX ADMIN — ENOXAPARIN SODIUM 40 MG: 100 INJECTION SUBCUTANEOUS at 17:27

## 2019-03-07 RX ADMIN — ACETAMINOPHEN 650 MG: 325 TABLET, FILM COATED ORAL at 14:29

## 2019-03-07 RX ADMIN — METRONIDAZOLE 500 MG: 500 TABLET ORAL at 04:59

## 2019-03-07 RX ADMIN — METRONIDAZOLE 500 MG: 500 TABLET ORAL at 21:45

## 2019-03-07 RX ADMIN — SODIUM CHLORIDE: 9 INJECTION, SOLUTION INTRAVENOUS at 14:30

## 2019-03-07 RX ADMIN — POTASSIUM CHLORIDE 40 MEQ: 1500 TABLET, EXTENDED RELEASE ORAL at 09:47

## 2019-03-07 RX ADMIN — ACETAMINOPHEN 650 MG: 325 TABLET, FILM COATED ORAL at 08:21

## 2019-03-07 RX ADMIN — OXYCODONE HYDROCHLORIDE 2.5 MG: 5 TABLET ORAL at 16:14

## 2019-03-07 ASSESSMENT — ENCOUNTER SYMPTOMS
NAUSEA: 0
SPUTUM PRODUCTION: 0
BLURRED VISION: 0
CONSTIPATION: 0
DEPRESSION: 0
HEADACHES: 0
PND: 0
DIZZINESS: 0
HEARTBURN: 0
FALLS: 0
BACK PAIN: 0
NECK PAIN: 0
WEAKNESS: 0
ABDOMINAL PAIN: 1
DIARRHEA: 0
WEIGHT LOSS: 0
PALPITATIONS: 0
VOMITING: 0
TREMORS: 0
FEVER: 0
WHEEZING: 0
EYE PAIN: 0
COUGH: 0

## 2019-03-07 ASSESSMENT — LIFESTYLE VARIABLES: SUBSTANCE_ABUSE: 0

## 2019-03-07 NOTE — PROGRESS NOTES
Assumed care of pt at 1900, no family at bedside, no c/o pain or other discomforts at that time. Meds given per MAR. Pt resting in bed now, call light in reach, no further needs at this time

## 2019-03-07 NOTE — CARE PLAN
Problem: Venous Thromboembolism (VTW)/Deep Vein Thrombosis (DVT) Prevention:  Goal: Patient will participate in Venous Thrombosis (VTE)/Deep Vein Thrombosis (DVT)Prevention Measures  Outcome: PROGRESSING AS EXPECTED  Lovenox administered as ordered and encouraged ambulation in ayala.    Problem: Discharge Barriers/Planning  Goal: Patient's continuum of care needs will be met  Outcome: PROGRESSING AS EXPECTED  Tolerating diet with no n/v, abdominal pain improved somewhat however still sore, medications adequately managing pain

## 2019-03-07 NOTE — CARE PLAN
Problem: Safety  Goal: Will remain free from injury  Outcome: PROGRESSING AS EXPECTED  Pt remains free from injury/falls this shift. Continue to assess for risks for injury/fall and implement prevention measures as needed      Problem: Venous Thromboembolism (VTW)/Deep Vein Thrombosis (DVT) Prevention:  Goal: Patient will participate in Venous Thrombosis (VTE)/Deep Vein Thrombosis (DVT)Prevention Measures  Outcome: PROGRESSING AS EXPECTED  Pt continues to refuse SCDs, but is on Lovenox for pharmacologic DVT/VTE prophylaxis and ambulates throughout the day. Continue to educate pt on early ambulation and assess for signs/symptoms of DVT/VTE.

## 2019-03-07 NOTE — DIETARY
Nutrition Services: Pt with poor PO intake pta per nutrition admit screen.    Pt is a 62 yo F admitted for diverticulitis, T2 DM and on day 2 of admit on a Diabetic/full liquid diet.    Ht: 162.6 cm  Wt: 81.647 kg per stand up scale   BMI: 30.9    Per ADL documentation, pt consuming % of meals since admit (x 2 meals). PO intake appears adequate at this time. Please consult RD if PO intake becomes poor.    RD available prn

## 2019-03-07 NOTE — PROGRESS NOTES
Pt feeling somewhat better today, denies n/v, tolerating diet, and pain adequately managed with tylenol. Ambulated unit x3 today. Discussed POC for day, addressed pt concerns/goals for day.

## 2019-03-08 ENCOUNTER — PATIENT OUTREACH (OUTPATIENT)
Dept: HEALTH INFORMATION MANAGEMENT | Facility: OTHER | Age: 64
End: 2019-03-08

## 2019-03-08 VITALS
TEMPERATURE: 97.1 F | SYSTOLIC BLOOD PRESSURE: 130 MMHG | BODY MASS INDEX: 30.73 KG/M2 | HEIGHT: 64 IN | RESPIRATION RATE: 17 BRPM | OXYGEN SATURATION: 97 % | WEIGHT: 180 LBS | HEART RATE: 71 BPM | DIASTOLIC BLOOD PRESSURE: 55 MMHG

## 2019-03-08 LAB
ANION GAP SERPL CALC-SCNC: 10 MMOL/L (ref 0–11.9)
BASOPHILS # BLD AUTO: 0.7 % (ref 0–1.8)
BASOPHILS # BLD: 0.06 K/UL (ref 0–0.12)
BUN SERPL-MCNC: 6 MG/DL (ref 8–22)
CALCIUM SERPL-MCNC: 9.3 MG/DL (ref 8.5–10.5)
CHLORIDE SERPL-SCNC: 105 MMOL/L (ref 96–112)
CO2 SERPL-SCNC: 25 MMOL/L (ref 20–33)
CREAT SERPL-MCNC: 0.56 MG/DL (ref 0.5–1.4)
EOSINOPHIL # BLD AUTO: 0.17 K/UL (ref 0–0.51)
EOSINOPHIL NFR BLD: 2 % (ref 0–6.9)
ERYTHROCYTE [DISTWIDTH] IN BLOOD BY AUTOMATED COUNT: 39.7 FL (ref 35.9–50)
GLUCOSE BLD-MCNC: 103 MG/DL (ref 65–99)
GLUCOSE BLD-MCNC: 108 MG/DL (ref 65–99)
GLUCOSE SERPL-MCNC: 99 MG/DL (ref 65–99)
HCT VFR BLD AUTO: 43.5 % (ref 37–47)
HGB BLD-MCNC: 14.5 G/DL (ref 12–16)
IMM GRANULOCYTES # BLD AUTO: 0.02 K/UL (ref 0–0.11)
IMM GRANULOCYTES NFR BLD AUTO: 0.2 % (ref 0–0.9)
LYMPHOCYTES # BLD AUTO: 4.13 K/UL (ref 1–4.8)
LYMPHOCYTES NFR BLD: 48.4 % (ref 22–41)
MCH RBC QN AUTO: 30.3 PG (ref 27–33)
MCHC RBC AUTO-ENTMCNC: 33.3 G/DL (ref 33.6–35)
MCV RBC AUTO: 90.8 FL (ref 81.4–97.8)
MONOCYTES # BLD AUTO: 0.67 K/UL (ref 0–0.85)
MONOCYTES NFR BLD AUTO: 7.8 % (ref 0–13.4)
NEUTROPHILS # BLD AUTO: 3.49 K/UL (ref 2–7.15)
NEUTROPHILS NFR BLD: 40.9 % (ref 44–72)
NRBC # BLD AUTO: 0 K/UL
NRBC BLD-RTO: 0 /100 WBC
PLATELET # BLD AUTO: 268 K/UL (ref 164–446)
PMV BLD AUTO: 8.9 FL (ref 9–12.9)
POTASSIUM SERPL-SCNC: 3.7 MMOL/L (ref 3.6–5.5)
RBC # BLD AUTO: 4.79 M/UL (ref 4.2–5.4)
SODIUM SERPL-SCNC: 140 MMOL/L (ref 135–145)
WBC # BLD AUTO: 8.5 K/UL (ref 4.8–10.8)

## 2019-03-08 PROCEDURE — 80048 BASIC METABOLIC PNL TOTAL CA: CPT

## 2019-03-08 PROCEDURE — 99239 HOSP IP/OBS DSCHRG MGMT >30: CPT | Performed by: INTERNAL MEDICINE

## 2019-03-08 PROCEDURE — 700105 HCHG RX REV CODE 258: Performed by: INTERNAL MEDICINE

## 2019-03-08 PROCEDURE — 85025 COMPLETE CBC W/AUTO DIFF WBC: CPT

## 2019-03-08 PROCEDURE — 82962 GLUCOSE BLOOD TEST: CPT

## 2019-03-08 PROCEDURE — 36415 COLL VENOUS BLD VENIPUNCTURE: CPT

## 2019-03-08 PROCEDURE — 700102 HCHG RX REV CODE 250 W/ 637 OVERRIDE(OP): Performed by: HOSPITALIST

## 2019-03-08 PROCEDURE — 700102 HCHG RX REV CODE 250 W/ 637 OVERRIDE(OP): Performed by: INTERNAL MEDICINE

## 2019-03-08 PROCEDURE — 700111 HCHG RX REV CODE 636 W/ 250 OVERRIDE (IP): Performed by: INTERNAL MEDICINE

## 2019-03-08 PROCEDURE — A9270 NON-COVERED ITEM OR SERVICE: HCPCS | Performed by: INTERNAL MEDICINE

## 2019-03-08 PROCEDURE — A9270 NON-COVERED ITEM OR SERVICE: HCPCS | Performed by: HOSPITALIST

## 2019-03-08 RX ORDER — AMOXICILLIN AND CLAVULANATE POTASSIUM 875; 125 MG/1; MG/1
1 TABLET, FILM COATED ORAL 2 TIMES DAILY
Qty: 14 TAB | Refills: 0 | Status: SHIPPED | OUTPATIENT
Start: 2019-03-08 | End: 2019-03-15

## 2019-03-08 RX ORDER — DEXTROSE MONOHYDRATE 25 G/50ML
25 INJECTION, SOLUTION INTRAVENOUS
Status: DISCONTINUED | OUTPATIENT
Start: 2019-03-08 | End: 2019-03-08 | Stop reason: HOSPADM

## 2019-03-08 RX ADMIN — SENNOSIDES AND DOCUSATE SODIUM 2 TABLET: 8.6; 5 TABLET ORAL at 05:28

## 2019-03-08 RX ADMIN — METRONIDAZOLE 500 MG: 500 TABLET ORAL at 05:26

## 2019-03-08 RX ADMIN — CEFTRIAXONE SODIUM 2 G: 2 INJECTION, POWDER, FOR SOLUTION INTRAMUSCULAR; INTRAVENOUS at 05:26

## 2019-03-08 NOTE — DISCHARGE INSTRUCTIONS
Discharge Instructions    Discharged to home by car with relative. Discharged via wheelchair, hospital escort: Refused.  Special equipment needed: Not Applicable    Be sure to schedule a follow-up appointment with your primary care doctor or any specialists as instructed.     Discharge Plan:   Diet Plan: Discussed  Activity Level: Discussed  Confirmed Follow up Appointment: Appointment Scheduled  Confirmed Symptoms Management: Discussed  Medication Reconciliation Updated: Yes  Pneumococcal Vaccine Administered/Refused: Not given - Patient refused pneumococcal vaccine (Pt would like to be reminded to ask f/u PCP about pneumonia vaccine)  Influenza Vaccine Indication: Not indicated: Previously immunized this influenza season and > 8 years of age    I understand that a diet low in cholesterol, fat, and sodium is recommended for good health. Unless I have been given specific instructions below for another diet, I accept this instruction as my diet prescription.   Other diet: diabetic    Special Instructions: None    · Is patient discharged on Warfarin / Coumadin?   No     Depression / Suicide Risk    As you are discharged from this [facility], it is important to learn how to keep safe from harming yourself.    Recognize the warning signs:  · Abrupt changes in personality, positive or negative- including increase in energy   · Giving away possessions  · Change in eating patterns- significant weight changes-  positive or negative  · Change in sleeping patterns- unable to sleep or sleeping all the time   · Unwillingness or inability to communicate  · Depression  · Unusual sadness, discouragement and loneliness  · Talk of wanting to die  · Neglect of personal appearance   · Rebelliousness- reckless behavior  · Withdrawal from people/activities they love  · Confusion- inability to concentrate     If you or a loved one observes any of these behaviors or has concerns about self-harm, here's what you can do:  · Talk  about it- your feelings and reasons for harming yourself  · Remove any means that you might use to hurt yourself (examples: pills, rope, extension cords, firearm)  · Get professional help from the community (Mental Health, Substance Abuse, psychological counseling)  · Do not be alone:Call your Safe Contact- someone whom you trust who will be there for you.  · Call your local CRISIS HOTLINE 296-5491 or 698-134-8356  · Call your local Children's Mobile Crisis Response Team [place] (953) 441-9671 or HidInImage  · Call the toll free National Suicide Prevention Hotlines   · National Suicide Prevention Lifeline 024-735-DHBS (8736)  · [employer] Line Network 800-SUICIDE (632-7283)      Diverticulitis  (Diverticulitis)  La diverticulitis es la inflamación o infección de pequeñas bolsas en el colon que se jan por lennox afección llamada diverticulosis. Las bolsas en el colon se denominan divertículos. El colon, o intestino grueso, es el lugar donde se absorbe agua y se jan las heces.  Entre las complicaciones de la diverticulitis, se incluyen:  · Hemorragias.  · Infección grave.  · Dolor intenso.  · Perforación del colon.  · Obstrucción del colon.  CAUSAS  La diverticulitis está causada por bacterias y  La diverticulitis se produce cuando queda materia fecal retenida en los divertículos. [place] permite que crezcan bacterias en los divertículos, lo que puede llevar a la inflamación e infección.  FACTORES DE RIESGO  Las personas con diverticulosis corren riesgo de presentar diverticulitis. Las dietas que no incluyen suficiente fibra proveniente de frutas y vegetales pueden predisponer a la diverticulitis.  SÍNTOMAS  Entre los síntomas de diverticulitis, se incluyen:  · Dolor y molestias en el abdomen. El dolor en general aparece en el lado joel del abdomen, waldemar puede aparecer en otras zonas.  · Fiebre o  escalofríos  · Hinchazón.  · Cólicos.  · Náuseas.  · Vómitos.  · Estreñimiento.  · Diarrea.  · Eliel en la materia fecal.  DIAGNÓSTICO  El médico le preguntará acerca de amber antecedentes médicos y le hará un examen físico. Es posible que le alecia estudios, porque hay muchas enfermedades que pueden causar los mismos síntomas que la diverticulitis. Los estudios pueden incluir:  · Análisis de eliel.  · Análisis de orina.  · Estudios por imágenes del abdomen, entre ellos, radiografías y tomografías computarizadas.  Cuando la afección esté controlada, el médico puede recomendarle que se katy lennox colonoscopía. La colonoscopía puede mostrar la gravedad de los divertículos y si hay algo más que esté causando los síntomas.  TRATAMIENTO  La mayoría de los casos de diverticulitis son leves y pueden tratarse en el hogar. El tratamiento puede incluir:  · [place] medicamentos para el dolor de venta marcela.  · Seguir lennox dieta líquida absoluta.  · Bethany antibióticos por vía oral savana 7 a 10 días.  Los casos más graves pueden tratarse en el hospital. El tratamiento puede incluir:  · No comer ni beber nada.  · Bethany medicamentos para el dolor recetados.  · Recibir antibióticos a través de lennox vía IV.  · Recibir líquidos y nutrición a través de lennox vía IV.  · Cirugía.  INSTRUCCIONES PARA EL CUIDADO EN EL HOGAR  · Siga cuidadosamente las indicaciones del médico.  · Siga las indicaciones del médico acerca de si debe consumir lennox dieta líquida o de otro tipo. Cuando los síntomas mejoren, el médico puede indicarle que modifique la dieta y recomendarle que consuma lennox dieta con alto contenido de fibra. Las frutas y los vegetales son buenas otto de fibra. La fibra facilita la eliminación de heces.  · [place] los suplementos con fibra o los probióticos según las indicaciones del médico.  · [place] los medicamentos solamente nereida se lo haya indicado el médico.  · Cumpla con todas las visitas de control.  SOLICITE ATENCIÓN MÉDICA SI:  · El  dolor no mejora.  · Le resulta difícil alimentarse.  · Los movimientos intestinales no se normalizan.  SOLICITE ATENCIÓN MÉDICA DE INMEDIATO SI:  · El dolor empeora.  · Los síntomas no mejoran.  · Los síntomas empeoran repentinamente.  · Tiene fiebre.  · Ha vomitado repetidas veces.  · La materia fecal es sanguinolenta o tawanda, de aspecto alquitranado.  ASEGÚRESE DE QUE:  · Comprende estas instrucciones.  · Controlará bernal afección.  · Recibirá ayuda de inmediato si no mejora o si empeora.  Esta información no tiene nereida fin reemplazar el consejo del médico. Asegúrese de hacerle al médico cualquier pregunta que tenga.  Document Released: 09/27/2006 Document Revised: 12/23/2014 Document Reviewed: 11/12/2014  [employer] Interactive Patient Education © 2017 Elsevier Inc.        Dieta mario en fibra  (High-Fiber Diet)  La fibra, también llamada fibra dietaria, es un tipo de carbohidrato que se encuentra en las frutas, las verduras, los cereales integrales y los frijoles. Lennox dieta mario en fibra puede tener muchos beneficios para la maite. El médico puede recomendar lennox dieta mario en fibra para ayudar a:  · Evitar el estreñimiento. La fibra puede hacer que defeque con más frecuencia.  · Disminuir el nivel de colesterol.  · Aliviar las hemorroides, la diverticulosis no complicada o el síndrome del intestino irritable.  · Evitar comer en exceso nereida parte de un plan para bajar de peso.  · Evitar cardiopatías, la diabetes tipo 2 y ciertos cánceres.  ¿EN QUÉ CONSISTE EL PLAN?  El consumo diario recomendado de fibra incluye lo siguiente:  · 38 gramos para hombres menores de 50 años.  · 30 gramos para hombres mayores de 50 años.  · 25 gramos para mujeres menores de 50 años.  · 21 gramos para mujeres mayores de 50 años.  Puede lograr el consumo diario recomendado de fibra si come lennox variedad de frutas, verduras, cereales y frijoles. El médico también puede recomendar un suplemento de fibra si no es posible obtener suficiente fibra  a través de la dieta.  ¿QUÉ FERN SABER ACERCA DE LA DIETA PAM EN FIBRA?  · La eficacia de los suplementos de fibra no ha sido estudiada ampliamente, de modo que es mejor obtener fibra a través de los alimentos.  · Verifique siempre el contenido de fibra en la etiqueta de información nutricional de los alimentos preenvasados. Busque alimentos que contengan al menos 5 gramos de fibra por porción.  · Consulte al nutricionista si tiene preguntas sobre algunos alimentos específicos relacionados con bernal enfermedad, especialmente si estos alimentos no se mencionan a continuación.  · Aumente el consumo diario de fibra en forma gradual. Aumentar demasiado rápido el consumo de fibra dietaria puede provocar meteorismo, cólicos o gases.  · Beber abundante agua. El agua ayuda a digerir la fibra.  ¿QUÉ ALIMENTOS PUEDO COMER?  Cereales   Panes integrales. Multicereales. Marianela. Arroz integral. Cebada. Vladimir burgol. Mijo. Muffins de salvado. Palomitas de maíz. Galletas de luna.  Verduras   Batatas. [place]spinaca. Col rizada. Alcachofas. Repollo. Brócoli. Guisantes. Zanahorias. [place].  Frutas   Bk rojos. Peras. Manzanas. Naranjas [place]. Ciruelas y pasas. Higos secos.  Argenis y otras otto de proteínas   Frijoles blancos, colorados, pintos y porotos de soja. Guisantes secos. Lentejas. Bk secos y semillas.  Lácteos   Yogur fortificado con fibra.  Bebidas   Leche de soja fortificada con fibra. Jugo de naranja fortificado con fibra.  Otros   Barras de fibra.  Los artículos mencionados arriba pueden no ser lennox lista completa de las bebidas o los alimentos recomendados. Comuníquese con el nutricionista para conocer más opciones.   ¿QUÉ ALIMENTOS NO SE RECOMIENDAN?  Cereales   Pan schmid. Pastas hechas con harina refinada. Arroz schmid.  Verduras   Deedee fritas. Verduras enlatadas. Verduras gianna cocidas.  Frutas   Jugo de frutas. Frutas cocidas coladas.  Argenis y otras otto de proteínas   Lechuga de carne con grasa. Aves o  pescados fritos.  Lácteos   Leche. Yogur. Queso crema. Crema ácida.  Bebidas   Gaseosas.  Otros   Tortas y pasteles. Mantequilla y aceites.  Los artículos mencionados arriba pueden no ser lennox lista completa de las bebidas y los alimentos que se deben evitar. Comuníquese con el nutricionista para obtener más información.   ALGUNOS CONSEJOS PARA INCLUIR ALIMENTOS RICOS EN FIBRA EN LA DIETA  · Consuma lennox gran variedad de alimentos ricos en fibra.  · Asegúrese de que la mitad de todos los cereales consumidos cada día roger cereales integrales.  · Reemplace los panes y cereales hechos de harina refinada o harina kash por panes y cereales integrales.  · Reemplace el arroz schmid por arroz integral, emanuel burgol o mijo.  · Comience el día con un desayuno rico en fibra, nereida un cereal que contenga al menos 5 gramos de fibra por porción.  · Use guisantes en lugar de carne en las sopas, ensaladas o pastas.  · Coma bocadillos ricos en fibra, nereida michelle rojos, verduras crudas, michelle secos o palomitas de maíz.  Esta información no tiene nereida fin reemplazar el consejo del médico. Asegúrese de hacerle al médico cualquier pregunta que tenga.  Document Released: 12/18/2006 Document Revised: 04/10/2017 Document Reviewed: 06/02/2015  Elsevier Interactive Patient Education © 2017 Elsevier Inc.

## 2019-03-08 NOTE — PROGRESS NOTES
[facility] Medicine Daily Progress Note    Date of Service  3/7/2019    Chief Complaint  63 y.o. female admitted 3/5/2019 with abdominal pain    Hospital Course    Past medical history of diabetes presented with complaint of abdominal pain.  Patient was noticed to have diverticulitis and failed outpatient antibiotics.  Patient was admitted with IV Rocephin and Flagyl.  Patient was treated with IV fluid rehydration and supportive care and pain control.  Patient symptoms start improving.      Interval Problem Update  3/6.  Patient still complains of left lower quadrant abd pain. Patient's pain is local , 4-5/10, intermittent and does not radiate to other location, sharp and with some tingling. Can be controlled by pain meds. Dressing in place. Patient otherwise denies fever, chills, SOB, CP, headache, constipation, diarrhea, cough, or sputum.  3/7.  Patient has been stable and no overnight acute complaint.  Patient complains of general body achiness and abd pain. Patient's pain is local, 5-6/10, intermittent and does not radiate to other location, sharp and with some tingling. Can be controlled by pain meds.  Patient need to be staying in the hospital for another day for IV antibiotics.  Patient diet is gradually advanced.    Consultants/Specialty  none    Code Status  full    Disposition  TND    Review of Systems  Review of Systems   Constitutional: Negative for fever and weight loss.   HENT: Negative for congestion, ear pain and hearing loss.    Eyes: Negative for blurred vision and pain.   Respiratory: Negative for cough, sputum production and wheezing.    Cardiovascular: Negative for chest pain, palpitations, leg swelling and PND.   Gastrointestinal: Positive for abdominal pain. Negative for constipation, diarrhea, heartburn, nausea and vomiting.   Genitourinary: Negative for dysuria, frequency and hematuria.   Musculoskeletal: Negative for back pain, falls and neck pain.   Skin: Negative for rash.   Neurological:  Negative for dizziness, tremors, weakness and headaches.   Psychiatric/Behavioral: Negative for depression and substance abuse.        Physical Exam  Temp:  [36.3 °C (97.4 °F)-36.6 °C (97.8 °F)] 36.3 °C (97.4 °F)  Pulse:  [70-76] 76  Resp:  [16-18] 16  BP: (115-118)/(51-72) 118/72  SpO2:  [92 %-97 %] 96 %    Physical Exam   Constitutional: She is oriented to person, place, and time. She appears well-developed and well-nourished. No distress.   HENT:   Head: Normocephalic.   Nose: Nose normal.   Mouth/Throat: No oropharyngeal exudate.   Eyes: Pupils are equal, round, and reactive to light. EOM are normal.   Neck: Normal range of motion. Neck supple. No JVD present.   Cardiovascular: Normal rate, regular rhythm and normal heart sounds.  Exam reveals no gallop.    No murmur heard.  Pulmonary/Chest: Effort normal and breath sounds normal. No respiratory distress. She has no rales.   Abdominal: Soft. Bowel sounds are normal. She exhibits no distension. There is tenderness. There is no rebound and no guarding.   Musculoskeletal: Normal range of motion. She exhibits no edema or tenderness.   Lymphadenopathy:     She has no cervical adenopathy.   Neurological: She is alert and oriented to person, place, and time. No cranial nerve deficit.   Skin: Skin is warm. No rash noted. She is not diaphoretic.   Psychiatric: Her behavior is normal.       Fluids    Intake/Output Summary (Last 24 hours) at 03/07/19 1649  Last data filed at 03/07/19 1400   Gross per 24 hour   Intake             2122 ml   Output                0 ml   Net             2122 ml       Laboratory  Recent Labs      03/05/19   0958  03/06/19   0254  03/07/19   0250   WBC  9.7  6.3  7.1   RBC  4.77  4.09*  4.32   HEMOGLOBIN  14.3  12.4  12.9   HEMATOCRIT  42.1  38.0  39.6   MCV  88.3  92.9  91.7   MCH  30.0  30.3  29.9   MCHC  34.0  32.6*  32.6*   RDW  38.9  41.8  40.2   PLATELETCT  320  227  253   MPV  8.5*  8.7*  8.8*     Recent Labs      03/05/19   0958   03/06/19   0254  03/07/19   0250   SODIUM  133*  141  143   POTASSIUM  3.7  3.8  3.5*   CHLORIDE  100  110  108   CO2  23  25  28   GLUCOSE  127*  98  99   BUN  5*  5*  5*   CREATININE  0.71  0.63  0.61   CALCIUM  9.5  8.3*  8.7                   Imaging  CT-ABDOMEN-PELVIS WITH   Final Result      Minimal inflammatory changes adjacent to the distal descending colon can be seen in the setting of diverticulitis.      No microperforation or abscess is identified.      Intra and extrahepatic biliary ductal dilatation. Correlation with LFTs is recommended. This may be related to ectasia in the setting of prior cholecystectomy.              Assessment/Plan  * Diverticulitis- (present on admission)   Assessment & Plan    Uncomplicated however failed outpatient antibiotics.  I Continue patient IV Rocephin and Flagyl.  Patient currently pain better controlled, CT abdomen did not show significant signs of perforation or abscess.  Slightly advance diet to GI soft diabetic as tolerated  Patient's abdominal pain still persist, need to continue IV antibiotics  If patient tolerated can consider outpatient follow-up  Patient has been in the hospital for 2 times last year and according to outpatient GI follow-up patient supposed to have colonoscopy soon.  I decreased IV fluid     Left lower quadrant pain- (present on admission)   Assessment & Plan    Likely from diverticulitis  Close monitor pain control  Need to continue antibiotics     Obesity (BMI 30-39.9)- (present on admission)   Assessment & Plan    Body mass index is 30.9 kg/m².    Weight reduction education provided patient agreed     Type 2 diabetes mellitus without complication, without long-term current use of insulin ([facility])- (present on admission)   Assessment & Plan    Patient has diabetes type 2, without long-term use of insulin, without complications  Hold patient home dose metformin.  Start patient on insulin sliding scale.  Gradually advance diet  Close monitor blood  sugar.  Currently controlled, hypoglycemic protocol in place.          VTE prophylaxis: SCD and Lovenox      Current Facility-Administered Medications:   •  cefTRIAXone (ROCEPHIN) 2 g in  mL IVPB, 2 g, Intravenous, Q24HRS, Orquidea Cobb M.D., Stopped at 03/07/19 0529  •  metroNIDAZOLE (FLAGYL) tablet 500 mg, 500 mg, Oral, Q8HRS, Orquidea Cobb M.D., 500 mg at 03/07/19 1429  •  senna-docusate (PERICOLACE or SENOKOT S) 8.6-50 MG per tablet 2 Tab, 2 Tab, Oral, BID, Stopped at 03/06/19 1711 **AND** polyethylene glycol/lytes (MIRALAX) PACKET 1 Packet, 1 Packet, Oral, QDAY PRN **AND** magnesium hydroxide (MILK OF MAGNESIA) suspension 30 mL, 30 mL, Oral, QDAY PRN **AND** bisacodyl (DULCOLAX) suppository 10 mg, 10 mg, Rectal, QDAY PRN, Prashant Vizcaino M.D.  •  NS infusion, , Intravenous, Continuous, Orquidea Cobb M.D., Last Rate: 50 mL/hr at 03/07/19 1430  •  enoxaparin (LOVENOX) inj 40 mg, 40 mg, Subcutaneous, DAILY, Prashant Vizcaino M.D., 40 mg at 03/05/19 1846  •  acetaminophen (TYLENOL) tablet 650 mg, 650 mg, Oral, Q6HRS PRN, Prashant Vizcaino M.D., 650 mg at 03/07/19 1429  •  Notify provider if pain remains uncontrolled, , , CONTINUOUS **AND** Use the numeric rating scale (NRS-11) on regular floors and Critical-Care Pain Observation Tool (CPOT) on ICUs/Trauma to assess pain, , , CONTINUOUS **AND** Pulse Ox (Oximetry), , , CONTINUOUS **AND** Pharmacy Consult Request ...Pain Management Review 1 Each, 1 Each, Other, PHARMACY TO DOSE **AND** If patient difficult to arouse and/or has respiratory depression, stop any opiates that are currently infusing and call a Rapid Response., , , CONTINUOUS **AND** oxyCODONE immediate-release (ROXICODONE) tablet 2.5 mg, 2.5 mg, Oral, Q3HRS PRN, 2.5 mg at 03/07/19 1614 **AND** oxyCODONE immediate-release (ROXICODONE) tablet 5 mg, 5 mg, Oral, Q3HRS PRN, 5 mg at 03/06/19 2108 **AND** morphine (pf) 4 mg/ml injection 2 mg, 2 mg, Intravenous, Q3HRS PRN, Prashant Vizcaino M.D.  •   ondansetron (ZOFRAN) syringe/vial injection 4 mg, 4 mg, Intravenous, Q4HRS PRN, Prashant Vizcaino M.D.  •  ondansetron (ZOFRAN ODT) dispertab 4 mg, 4 mg, Oral, Q4HRS PRN, Prashant Vizcaino M.D.  •  promethazine (PHENERGAN) tablet 12.5-25 mg, 12.5-25 mg, Oral, Q4HRS PRN, Prashant Vizcaino M.D.  •  promethazine (PHENERGAN) suppository 12.5-25 mg, 12.5-25 mg, Rectal, Q4HRS PRN, Prashant Vizcaino M.D.  •  prochlorperazine (COMPAZINE) injection 5-10 mg, 5-10 mg, Intravenous, Q4HRS PRN, Prashant Vizcaino M.D.  •  insulin regular (HUMULIN R) injection 2-9 Units, 2-9 Units, Subcutaneous, Q6HRS, Stopped at 03/05/19 1800 **AND** Accu-Chek Q6 if NPO, , , Q6H **AND** NOTIFY MD and PharmD, , , Once **AND** glucose 4 g chewable tablet 16 g, 16 g, Oral, Q15 MIN PRN **AND** dextrose 50% (D50W) injection 25 mL, 25 mL, Intravenous, Q15 MIN PRN, Prashant Vizcaino M.D.

## 2019-03-08 NOTE — CARE PLAN
Problem: Infection  Goal: Will remain free from infection  Pt receiving oral flagyl and IV rocephin. Afebrile.    Problem: Safety  Goal: Will remain free from injury  Safety precautions in place. Bed in low position, treaded socks on, personal possessions in reach, call light in reach. Pt ambulates with steady gait.

## 2019-03-08 NOTE — PROGRESS NOTES
Pt ambulated in hallway with steady gait, accompanied by her sister. No pain reported at this time. Safety precautions and hourly rounding in place.

## 2019-03-08 NOTE — PROGRESS NOTES
Patient stable. Lungs clear. Positive bowel sounds. No complaints of pain at this time. Semi-formed bowel movement this am. All discharge instructions provided in english and [place], educated family members as well. Wished to ambulate with family for discharge, declined wheelchair use.

## 2019-03-08 NOTE — DISCHARGE SUMMARY
Discharge Summary    CHIEF COMPLAINT ON ADMISSION  Chief Complaint   Patient presents with   • Abdominal Pain     pt reports to have abd pain for 8 days. recent hx of diverticulitis. being seen by GI consultants. recent abx therapy w/o relief.    • Vomiting       Reason for Admission  Abdominal Pain     Admission Date  3/5/2019    CODE STATUS  Full Code    HPI & HOSPITAL COURSE  This is a 63 y.o. female here with   Past medical history of diabetes presented with complaint of abdominal pain.  Patient was noticed to have diverticulitis and failed outpatient antibiotics.  Patient was admitted with IV Rocephin and Flagyl.  Patient was treated with IV fluid rehydration and supportive care and pain control.  Patient symptoms start improving. Patient also tolerated diet and patient's symptoms resolved.  Patient is recommended to follow-up with GI doctor for colonoscopy.  It is further clarified that patient only have 2 diverticulitis flare last year.  Patient will be discharged with oral antibiotics.    Therefore, she is discharged in good and stable condition to home with close outpatient follow-up.    The patient met 2-midnight criteria for an inpatient stay at the time of discharge.    Discharge Date  3/8/2019    FOLLOW UP ITEMS POST DISCHARGE  none    DISCHARGE DIAGNOSES      Diverticulitis POA: Yes    Type 2 diabetes mellitus without complication, without long-term current use of insulin (HCC) POA: Yes    Obesity (BMI 30-39.9) POA: Yes    Left lower quadrant pain POA: Yes        FOLLOW UP  No future appointments.  Armond Quevedo M.D.  [address] [facility] [address]  [phone]    Go on 3/12/2019  Please arrive at 3:00 pm for your appointment with primary care.       MEDICATIONS ON DISCHARGE     Medication List      START taking these medications      Instructions   amoxicillin-clavulanate 875-125 MG Tabs  Commonly known as:  AUGMENTIN   Take 1 Tab by mouth 2 times a day for 7 days.  Dose:  1 Tab         CONTINUE taking these medications      Instructions   ibuprofen 600 MG Tabs  Commonly known as:  MOTRIN   Take 600 mg by mouth every 6 hours as needed.  Dose:  600 mg     metFORMIN 500 MG Tabs  Commonly known as:  GLUCOPHAGE   Take 500 mg by mouth every day.  Dose:  500 mg        STOP taking these medications    metroNIDAZOLE 500 MG Tabs  Commonly known as:  FLAGYL     sulfamethoxazole-trimethoprim 800-160 MG tablet  Commonly known as:  BACTRIM DS            Allergies  No Known Allergies    DIET  Orders Placed This Encounter   Procedures   • Diet Order Diabetic, Full Liquid     Standing Status:   Standing     Number of Occurrences:   1     Order Specific Question:   Diet:     Answer:   Diabetic [3]     Order Specific Question:   Diet:     Answer:   Full Liquid [11]       ACTIVITY  As tolerated.  Weight bearing as tolerated    CONSULTATIONS  none    PROCEDURES  None    CT-ABDOMEN-PELVIS WITH   Final Result      Minimal inflammatory changes adjacent to the distal descending colon can be seen in the setting of diverticulitis.      No microperforation or abscess is identified.      Intra and extrahepatic biliary ductal dilatation. Correlation with LFTs is recommended. This may be related to ectasia in the setting of prior cholecystectomy.           PE:  Gen: AAOx3, NAD  Eyes: PELLA  Neck: no JVD, no lymphadenopathy  Cardia: RRR, no mrg  Lungs: CTAB, no rales, rhonci or wheezing  Abd: NABS, soft, non extended, no mass  EXT: No C/C/E, peripheral pulse 2+ b/l  Neuro: CN II-XII intact, non focal, reflex 2+ symmetrical  Skin: Intact, no lesion, warm  Psych: Appropriate.      LABORATORY  Lab Results   Component Value Date    SODIUM 140 03/08/2019    POTASSIUM 3.7 03/08/2019    CHLORIDE 105 03/08/2019    CO2 25 03/08/2019    GLUCOSE 99 03/08/2019    BUN 6 (L) 03/08/2019    CREATININE 0.56 03/08/2019        Lab Results   Component Value Date    WBC 8.5 03/08/2019    HEMOGLOBIN 14.5 03/08/2019    HEMATOCRIT 43.5 03/08/2019     PLATELETCT 268 03/08/2019        Total time of the discharge process exceeds 37 minutes.

## 2019-03-10 LAB
BACTERIA BLD CULT: NORMAL
BACTERIA BLD CULT: NORMAL
SIGNIFICANT IND 70042: NORMAL
SIGNIFICANT IND 70042: NORMAL
SITE SITE: NORMAL
SITE SITE: NORMAL
SOURCE SOURCE: NORMAL
SOURCE SOURCE: NORMAL

## 2020-02-28 ENCOUNTER — APPOINTMENT (OUTPATIENT)
Dept: RADIOLOGY | Facility: MEDICAL CENTER | Age: 65
DRG: 392 | End: 2020-02-28
Attending: EMERGENCY MEDICINE
Payer: COMMERCIAL

## 2020-02-28 ENCOUNTER — HOSPITAL ENCOUNTER (INPATIENT)
Facility: MEDICAL CENTER | Age: 65
LOS: 3 days | DRG: 392 | End: 2020-03-02
Attending: EMERGENCY MEDICINE | Admitting: HOSPITALIST
Payer: COMMERCIAL

## 2020-02-28 LAB
ALBUMIN SERPL BCP-MCNC: 3.9 G/DL (ref 3.2–4.9)
ALBUMIN/GLOB SERPL: 1.1 G/DL
ALP SERPL-CCNC: 63 U/L (ref 30–99)
ALT SERPL-CCNC: 37 U/L (ref 2–50)
ANION GAP SERPL CALC-SCNC: 10 MMOL/L (ref 0–11.9)
APPEARANCE UR: CLEAR
AST SERPL-CCNC: 27 U/L (ref 12–45)
BASOPHILS # BLD AUTO: 0.6 % (ref 0–1.8)
BASOPHILS # BLD: 0.05 K/UL (ref 0–0.12)
BILIRUB SERPL-MCNC: 0.4 MG/DL (ref 0.1–1.5)
BILIRUB UR QL STRIP.AUTO: NEGATIVE
BUN SERPL-MCNC: 8 MG/DL (ref 8–22)
CALCIUM SERPL-MCNC: 9.2 MG/DL (ref 8.5–10.5)
CHLORIDE SERPL-SCNC: 102 MMOL/L (ref 96–112)
CO2 SERPL-SCNC: 23 MMOL/L (ref 20–33)
COLOR UR: YELLOW
CREAT SERPL-MCNC: 0.87 MG/DL (ref 0.5–1.4)
EOSINOPHIL # BLD AUTO: 0.11 K/UL (ref 0–0.51)
EOSINOPHIL NFR BLD: 1.3 % (ref 0–6.9)
ERYTHROCYTE [DISTWIDTH] IN BLOOD BY AUTOMATED COUNT: 38.6 FL (ref 35.9–50)
GLOBULIN SER CALC-MCNC: 3.4 G/DL (ref 1.9–3.5)
GLUCOSE SERPL-MCNC: 130 MG/DL (ref 65–99)
GLUCOSE UR STRIP.AUTO-MCNC: NEGATIVE MG/DL
HCT VFR BLD AUTO: 38.9 % (ref 37–47)
HGB BLD-MCNC: 13.1 G/DL (ref 12–16)
IMM GRANULOCYTES # BLD AUTO: 0.05 K/UL (ref 0–0.11)
IMM GRANULOCYTES NFR BLD AUTO: 0.6 % (ref 0–0.9)
KETONES UR STRIP.AUTO-MCNC: NEGATIVE MG/DL
LEUKOCYTE ESTERASE UR QL STRIP.AUTO: NEGATIVE
LIPASE SERPL-CCNC: 10 U/L (ref 11–82)
LYMPHOCYTES # BLD AUTO: 2.94 K/UL (ref 1–4.8)
LYMPHOCYTES NFR BLD: 34.4 % (ref 22–41)
MCH RBC QN AUTO: 29.4 PG (ref 27–33)
MCHC RBC AUTO-ENTMCNC: 33.7 G/DL (ref 33.6–35)
MCV RBC AUTO: 87.2 FL (ref 81.4–97.8)
MICRO URNS: NORMAL
MONOCYTES # BLD AUTO: 0.57 K/UL (ref 0–0.85)
MONOCYTES NFR BLD AUTO: 6.7 % (ref 0–13.4)
NEUTROPHILS # BLD AUTO: 4.82 K/UL (ref 2–7.15)
NEUTROPHILS NFR BLD: 56.4 % (ref 44–72)
NITRITE UR QL STRIP.AUTO: NEGATIVE
NRBC # BLD AUTO: 0 K/UL
NRBC BLD-RTO: 0 /100 WBC
PH UR STRIP.AUTO: 7 [PH] (ref 5–8)
PLATELET # BLD AUTO: 238 K/UL (ref 164–446)
PMV BLD AUTO: 8.7 FL (ref 9–12.9)
POTASSIUM SERPL-SCNC: 3.7 MMOL/L (ref 3.6–5.5)
PROT SERPL-MCNC: 7.3 G/DL (ref 6–8.2)
PROT UR QL STRIP: NEGATIVE MG/DL
RBC # BLD AUTO: 4.46 M/UL (ref 4.2–5.4)
RBC UR QL AUTO: NEGATIVE
SODIUM SERPL-SCNC: 135 MMOL/L (ref 135–145)
SP GR UR STRIP.AUTO: 1
UROBILINOGEN UR STRIP.AUTO-MCNC: 0.2 MG/DL
WBC # BLD AUTO: 8.5 K/UL (ref 4.8–10.8)

## 2020-02-28 PROCEDURE — 99222 1ST HOSP IP/OBS MODERATE 55: CPT | Performed by: HOSPITALIST

## 2020-02-28 PROCEDURE — 74177 CT ABD & PELVIS W/CONTRAST: CPT

## 2020-02-28 PROCEDURE — 700102 HCHG RX REV CODE 250 W/ 637 OVERRIDE(OP): Performed by: HOSPITALIST

## 2020-02-28 PROCEDURE — 700105 HCHG RX REV CODE 258: Performed by: EMERGENCY MEDICINE

## 2020-02-28 PROCEDURE — 85025 COMPLETE CBC W/AUTO DIFF WBC: CPT

## 2020-02-28 PROCEDURE — 700111 HCHG RX REV CODE 636 W/ 250 OVERRIDE (IP): Performed by: EMERGENCY MEDICINE

## 2020-02-28 PROCEDURE — 96368 THER/DIAG CONCURRENT INF: CPT

## 2020-02-28 PROCEDURE — A9270 NON-COVERED ITEM OR SERVICE: HCPCS | Performed by: HOSPITALIST

## 2020-02-28 PROCEDURE — 96365 THER/PROPH/DIAG IV INF INIT: CPT

## 2020-02-28 PROCEDURE — 80053 COMPREHEN METABOLIC PANEL: CPT

## 2020-02-28 PROCEDURE — 99285 EMERGENCY DEPT VISIT HI MDM: CPT

## 2020-02-28 PROCEDURE — 81003 URINALYSIS AUTO W/O SCOPE: CPT

## 2020-02-28 PROCEDURE — 36415 COLL VENOUS BLD VENIPUNCTURE: CPT

## 2020-02-28 PROCEDURE — 700101 HCHG RX REV CODE 250: Performed by: EMERGENCY MEDICINE

## 2020-02-28 PROCEDURE — 83690 ASSAY OF LIPASE: CPT

## 2020-02-28 PROCEDURE — 770006 HCHG ROOM/CARE - MED/SURG/GYN SEMI*

## 2020-02-28 PROCEDURE — 700117 HCHG RX CONTRAST REV CODE 255: Performed by: EMERGENCY MEDICINE

## 2020-02-28 PROCEDURE — 96375 TX/PRO/DX INJ NEW DRUG ADDON: CPT

## 2020-02-28 PROCEDURE — 700101 HCHG RX REV CODE 250: Performed by: HOSPITALIST

## 2020-02-28 RX ORDER — POLYETHYLENE GLYCOL 3350 17 G/17G
1 POWDER, FOR SOLUTION ORAL
Status: DISCONTINUED | OUTPATIENT
Start: 2020-02-28 | End: 2020-03-02 | Stop reason: HOSPADM

## 2020-02-28 RX ORDER — ONDANSETRON 2 MG/ML
4 INJECTION INTRAMUSCULAR; INTRAVENOUS ONCE
Status: COMPLETED | OUTPATIENT
Start: 2020-02-28 | End: 2020-02-28

## 2020-02-28 RX ORDER — PROMETHAZINE HYDROCHLORIDE 25 MG/1
12.5-25 TABLET ORAL EVERY 4 HOURS PRN
Status: DISCONTINUED | OUTPATIENT
Start: 2020-02-28 | End: 2020-03-02 | Stop reason: HOSPADM

## 2020-02-28 RX ORDER — SULFAMETHOXAZOLE AND TRIMETHOPRIM 800; 160 MG/1; MG/1
1 TABLET ORAL 2 TIMES DAILY
Status: ON HOLD | COMMUNITY
Start: 2020-02-25 | End: 2020-03-02 | Stop reason: SDUPTHER

## 2020-02-28 RX ORDER — OXYCODONE HYDROCHLORIDE 5 MG/1
5 TABLET ORAL
Status: DISCONTINUED | OUTPATIENT
Start: 2020-02-28 | End: 2020-03-02 | Stop reason: HOSPADM

## 2020-02-28 RX ORDER — ACETAMINOPHEN 500 MG
1000 TABLET ORAL EVERY 6 HOURS PRN
Status: DISCONTINUED | OUTPATIENT
Start: 2020-02-28 | End: 2020-03-02 | Stop reason: HOSPADM

## 2020-02-28 RX ORDER — PROCHLORPERAZINE EDISYLATE 5 MG/ML
5-10 INJECTION INTRAMUSCULAR; INTRAVENOUS EVERY 4 HOURS PRN
Status: DISCONTINUED | OUTPATIENT
Start: 2020-02-28 | End: 2020-03-02 | Stop reason: HOSPADM

## 2020-02-28 RX ORDER — OXYCODONE HYDROCHLORIDE 10 MG/1
10 TABLET ORAL
Status: DISCONTINUED | OUTPATIENT
Start: 2020-02-28 | End: 2020-03-02 | Stop reason: HOSPADM

## 2020-02-28 RX ORDER — BISACODYL 10 MG
10 SUPPOSITORY, RECTAL RECTAL
Status: DISCONTINUED | OUTPATIENT
Start: 2020-02-28 | End: 2020-03-02 | Stop reason: HOSPADM

## 2020-02-28 RX ORDER — AMOXICILLIN 250 MG
2 CAPSULE ORAL 2 TIMES DAILY
Status: DISCONTINUED | OUTPATIENT
Start: 2020-02-28 | End: 2020-03-02 | Stop reason: HOSPADM

## 2020-02-28 RX ORDER — ONDANSETRON 2 MG/ML
4 INJECTION INTRAMUSCULAR; INTRAVENOUS EVERY 4 HOURS PRN
Status: DISCONTINUED | OUTPATIENT
Start: 2020-02-28 | End: 2020-03-02 | Stop reason: HOSPADM

## 2020-02-28 RX ORDER — PROMETHAZINE HYDROCHLORIDE 12.5 MG/1
12.5-25 SUPPOSITORY RECTAL EVERY 4 HOURS PRN
Status: DISCONTINUED | OUTPATIENT
Start: 2020-02-28 | End: 2020-03-02 | Stop reason: HOSPADM

## 2020-02-28 RX ORDER — METRONIDAZOLE 500 MG/1
500 TABLET ORAL 3 TIMES DAILY
Status: ON HOLD | COMMUNITY
Start: 2020-02-25 | End: 2020-03-02 | Stop reason: SDUPTHER

## 2020-02-28 RX ORDER — ONDANSETRON 4 MG/1
4 TABLET, ORALLY DISINTEGRATING ORAL EVERY 4 HOURS PRN
Status: DISCONTINUED | OUTPATIENT
Start: 2020-02-28 | End: 2020-03-02 | Stop reason: HOSPADM

## 2020-02-28 RX ORDER — HYDROMORPHONE HYDROCHLORIDE 1 MG/ML
0.5 INJECTION, SOLUTION INTRAMUSCULAR; INTRAVENOUS; SUBCUTANEOUS
Status: DISCONTINUED | OUTPATIENT
Start: 2020-02-28 | End: 2020-03-02 | Stop reason: HOSPADM

## 2020-02-28 RX ORDER — HYDROMORPHONE HYDROCHLORIDE 1 MG/ML
0.5 INJECTION, SOLUTION INTRAMUSCULAR; INTRAVENOUS; SUBCUTANEOUS ONCE
Status: COMPLETED | OUTPATIENT
Start: 2020-02-28 | End: 2020-02-28

## 2020-02-28 RX ADMIN — SENNOSIDES AND DOCUSATE SODIUM 2 TABLET: 8.6; 5 TABLET ORAL at 21:58

## 2020-02-28 RX ADMIN — METRONIDAZOLE 500 MG: 500 INJECTION, SOLUTION INTRAVENOUS at 19:30

## 2020-02-28 RX ADMIN — CEFTRIAXONE SODIUM 2 G: 2 INJECTION, POWDER, FOR SOLUTION INTRAMUSCULAR; INTRAVENOUS at 19:42

## 2020-02-28 RX ADMIN — ONDANSETRON 4 MG: 2 INJECTION INTRAMUSCULAR; INTRAVENOUS at 16:40

## 2020-02-28 RX ADMIN — HYDROMORPHONE HYDROCHLORIDE 0.5 MG: 1 INJECTION, SOLUTION INTRAMUSCULAR; INTRAVENOUS; SUBCUTANEOUS at 16:40

## 2020-02-28 RX ADMIN — METRONIDAZOLE 500 MG: 500 INJECTION, SOLUTION INTRAVENOUS at 22:00

## 2020-02-28 RX ADMIN — IOHEXOL 90 ML: 350 INJECTION, SOLUTION INTRAVENOUS at 19:05

## 2020-02-28 SDOH — ECONOMIC STABILITY: TRANSPORTATION INSECURITY
IN THE PAST 12 MONTHS, HAS LACK OF TRANSPORTATION KEPT YOU FROM MEETINGS, WORK, OR FROM GETTING THINGS NEEDED FOR DAILY LIVING?: PATIENT DECLINED

## 2020-02-28 SDOH — ECONOMIC STABILITY: FOOD INSECURITY: WITHIN THE PAST 12 MONTHS, THE FOOD YOU BOUGHT JUST DIDN'T LAST AND YOU DIDN'T HAVE MONEY TO GET MORE.: PATIENT DECLINED

## 2020-02-28 SDOH — ECONOMIC STABILITY: TRANSPORTATION INSECURITY
IN THE PAST 12 MONTHS, HAS THE LACK OF TRANSPORTATION KEPT YOU FROM MEDICAL APPOINTMENTS OR FROM GETTING MEDICATIONS?: PATIENT DECLINED

## 2020-02-28 SDOH — ECONOMIC STABILITY: FOOD INSECURITY: WITHIN THE PAST 12 MONTHS, YOU WORRIED THAT YOUR FOOD WOULD RUN OUT BEFORE YOU GOT MONEY TO BUY MORE.: PATIENT DECLINED

## 2020-02-28 ASSESSMENT — ENCOUNTER SYMPTOMS
DEPRESSION: 0
COUGH: 0
PSYCHIATRIC NEGATIVE: 1
WEAKNESS: 0
MYALGIAS: 0
FLANK PAIN: 0
CHILLS: 0
RESPIRATORY NEGATIVE: 1
FEVER: 0
VOMITING: 0
SHORTNESS OF BREATH: 0
NAUSEA: 1
WEIGHT LOSS: 0
NEUROLOGICAL NEGATIVE: 1
DIZZINESS: 0
BRUISES/BLEEDS EASILY: 0
BACK PAIN: 0
MUSCULOSKELETAL NEGATIVE: 1
NERVOUS/ANXIOUS: 0
ABDOMINAL PAIN: 1
LOSS OF CONSCIOUSNESS: 0
CARDIOVASCULAR NEGATIVE: 1
CONSTITUTIONAL NEGATIVE: 1

## 2020-02-28 ASSESSMENT — FIBROSIS 4 INDEX: FIB4 SCORE: 1.31

## 2020-02-28 ASSESSMENT — LIFESTYLE VARIABLES
HOW MANY TIMES IN THE PAST YEAR HAVE YOU HAD 5 OR MORE DRINKS IN A DAY: 0
HAVE YOU EVER FELT YOU SHOULD CUT DOWN ON YOUR DRINKING: NO
EVER FELT BAD OR GUILTY ABOUT YOUR DRINKING: NO
EVER HAD A DRINK FIRST THING IN THE MORNING TO STEADY YOUR NERVES TO GET RID OF A HANGOVER: NO
CONSUMPTION TOTAL: NEGATIVE
AVERAGE NUMBER OF DAYS PER WEEK YOU HAVE A DRINK CONTAINING ALCOHOL: 0
HAVE PEOPLE ANNOYED YOU BY CRITICIZING YOUR DRINKING: NO
TOTAL SCORE: 0
ALCOHOL_USE: NO
ON A TYPICAL DAY WHEN YOU DRINK ALCOHOL HOW MANY DRINKS DO YOU HAVE: 0
EVER_SMOKED: NEVER

## 2020-02-28 ASSESSMENT — PATIENT HEALTH QUESTIONNAIRE - PHQ9
SUM OF ALL RESPONSES TO PHQ9 QUESTIONS 1 AND 2: 0
2. FEELING DOWN, DEPRESSED, IRRITABLE, OR HOPELESS: NOT AT ALL
1. LITTLE INTEREST OR PLEASURE IN DOING THINGS: NOT AT ALL

## 2020-02-28 ASSESSMENT — COPD QUESTIONNAIRES
DURING THE PAST 4 WEEKS HOW MUCH DID YOU FEEL SHORT OF BREATH: NONE/LITTLE OF THE TIME
HAVE YOU SMOKED AT LEAST 100 CIGARETTES IN YOUR ENTIRE LIFE: NO/DON'T KNOW
DO YOU EVER COUGH UP ANY MUCUS OR PHLEGM?: NO/ONLY WITH OCCASIONAL COLDS OR INFECTIONS
COPD SCREENING SCORE: 1
IN THE PAST 12 MONTHS DO YOU DO LESS THAN YOU USED TO BECAUSE OF YOUR BREATHING PROBLEMS: DISAGREE/UNSURE

## 2020-02-28 NOTE — ED TRIAGE NOTES
Chief Complaint   Patient presents with   • Abdominal Pain     x 1 week     Pt ambulatory to triage with above complaint.  Pt states hx of diverticulitis and was seen at her PCP in [place] and given antibx and pain medication. Pt states compliance with meds and states no relief.      Pt instructed to triage process and advised to alert staff of any changes.  Pt returned to lobby.  Pt states understanding.

## 2020-02-29 LAB
ALBUMIN SERPL BCP-MCNC: 3.7 G/DL (ref 3.2–4.9)
ALBUMIN/GLOB SERPL: 1.2 G/DL
ALP SERPL-CCNC: 59 U/L (ref 30–99)
ALT SERPL-CCNC: 31 U/L (ref 2–50)
ANION GAP SERPL CALC-SCNC: 10 MMOL/L (ref 0–11.9)
AST SERPL-CCNC: 26 U/L (ref 12–45)
BASOPHILS # BLD AUTO: 0.6 % (ref 0–1.8)
BASOPHILS # BLD: 0.04 K/UL (ref 0–0.12)
BILIRUB SERPL-MCNC: 0.5 MG/DL (ref 0.1–1.5)
BUN SERPL-MCNC: 7 MG/DL (ref 8–22)
CALCIUM SERPL-MCNC: 9.1 MG/DL (ref 8.5–10.5)
CHLORIDE SERPL-SCNC: 106 MMOL/L (ref 96–112)
CO2 SERPL-SCNC: 23 MMOL/L (ref 20–33)
CREAT SERPL-MCNC: 0.79 MG/DL (ref 0.5–1.4)
EOSINOPHIL # BLD AUTO: 0.25 K/UL (ref 0–0.51)
EOSINOPHIL NFR BLD: 3.5 % (ref 0–6.9)
ERYTHROCYTE [DISTWIDTH] IN BLOOD BY AUTOMATED COUNT: 40.6 FL (ref 35.9–50)
GLOBULIN SER CALC-MCNC: 3 G/DL (ref 1.9–3.5)
GLUCOSE SERPL-MCNC: 111 MG/DL (ref 65–99)
HCT VFR BLD AUTO: 39.5 % (ref 37–47)
HGB BLD-MCNC: 12.9 G/DL (ref 12–16)
IMM GRANULOCYTES # BLD AUTO: 0.01 K/UL (ref 0–0.11)
IMM GRANULOCYTES NFR BLD AUTO: 0.1 % (ref 0–0.9)
LYMPHOCYTES # BLD AUTO: 3.43 K/UL (ref 1–4.8)
LYMPHOCYTES NFR BLD: 47.5 % (ref 22–41)
MCH RBC QN AUTO: 29.9 PG (ref 27–33)
MCHC RBC AUTO-ENTMCNC: 32.7 G/DL (ref 33.6–35)
MCV RBC AUTO: 91.4 FL (ref 81.4–97.8)
MONOCYTES # BLD AUTO: 0.59 K/UL (ref 0–0.85)
MONOCYTES NFR BLD AUTO: 8.2 % (ref 0–13.4)
NEUTROPHILS # BLD AUTO: 2.9 K/UL (ref 2–7.15)
NEUTROPHILS NFR BLD: 40.1 % (ref 44–72)
NRBC # BLD AUTO: 0 K/UL
NRBC BLD-RTO: 0 /100 WBC
PLATELET # BLD AUTO: 219 K/UL (ref 164–446)
PMV BLD AUTO: 8.8 FL (ref 9–12.9)
POTASSIUM SERPL-SCNC: 4 MMOL/L (ref 3.6–5.5)
PROT SERPL-MCNC: 6.7 G/DL (ref 6–8.2)
RBC # BLD AUTO: 4.32 M/UL (ref 4.2–5.4)
SODIUM SERPL-SCNC: 139 MMOL/L (ref 135–145)
WBC # BLD AUTO: 7.2 K/UL (ref 4.8–10.8)

## 2020-02-29 PROCEDURE — 36415 COLL VENOUS BLD VENIPUNCTURE: CPT

## 2020-02-29 PROCEDURE — 700102 HCHG RX REV CODE 250 W/ 637 OVERRIDE(OP): Performed by: HOSPITALIST

## 2020-02-29 PROCEDURE — 80053 COMPREHEN METABOLIC PANEL: CPT

## 2020-02-29 PROCEDURE — 770006 HCHG ROOM/CARE - MED/SURG/GYN SEMI*

## 2020-02-29 PROCEDURE — A9270 NON-COVERED ITEM OR SERVICE: HCPCS | Performed by: HOSPITALIST

## 2020-02-29 PROCEDURE — 700105 HCHG RX REV CODE 258: Performed by: HOSPITALIST

## 2020-02-29 PROCEDURE — 99232 SBSQ HOSP IP/OBS MODERATE 35: CPT | Performed by: HOSPITALIST

## 2020-02-29 PROCEDURE — 700101 HCHG RX REV CODE 250: Performed by: HOSPITALIST

## 2020-02-29 PROCEDURE — 85025 COMPLETE CBC W/AUTO DIFF WBC: CPT

## 2020-02-29 PROCEDURE — 700111 HCHG RX REV CODE 636 W/ 250 OVERRIDE (IP): Performed by: HOSPITALIST

## 2020-02-29 RX ADMIN — METRONIDAZOLE 500 MG: 500 INJECTION, SOLUTION INTRAVENOUS at 22:58

## 2020-02-29 RX ADMIN — SENNOSIDES AND DOCUSATE SODIUM 2 TABLET: 8.6; 5 TABLET ORAL at 05:26

## 2020-02-29 RX ADMIN — METRONIDAZOLE 500 MG: 500 INJECTION, SOLUTION INTRAVENOUS at 14:19

## 2020-02-29 RX ADMIN — METRONIDAZOLE 500 MG: 500 INJECTION, SOLUTION INTRAVENOUS at 05:26

## 2020-02-29 RX ADMIN — CEFTRIAXONE SODIUM 1 G: 1 INJECTION, POWDER, FOR SOLUTION INTRAMUSCULAR; INTRAVENOUS at 20:02

## 2020-02-29 RX ADMIN — POLYETHYLENE GLYCOL 3350 1 PACKET: 17 POWDER, FOR SOLUTION ORAL at 09:33

## 2020-02-29 RX ADMIN — SENNOSIDES AND DOCUSATE SODIUM 2 TABLET: 8.6; 5 TABLET ORAL at 18:31

## 2020-02-29 RX ADMIN — ACETAMINOPHEN 1000 MG: 500 TABLET ORAL at 09:33

## 2020-02-29 RX ADMIN — OXYCODONE HYDROCHLORIDE 5 MG: 5 TABLET ORAL at 18:31

## 2020-02-29 ASSESSMENT — ENCOUNTER SYMPTOMS
ABDOMINAL PAIN: 1
BLOOD IN STOOL: 0
WEIGHT LOSS: 1
EYE DISCHARGE: 0
DEPRESSION: 0
SHORTNESS OF BREATH: 0
CHILLS: 0
CONSTIPATION: 1
DIZZINESS: 0
MYALGIAS: 1
EYE PAIN: 0
VOMITING: 1
COUGH: 0
HEADACHES: 0
NAUSEA: 1
FEVER: 0
SORE THROAT: 0
NERVOUS/ANXIOUS: 1

## 2020-02-29 ASSESSMENT — FIBROSIS 4 INDEX: FIB4 SCORE: 1.36

## 2020-02-29 NOTE — ASSESSMENT & PLAN NOTE
Failed outpatient antibiotics therapy.  Admit for IV antibiotics, ceftriaxone and metronidazole.  Pain management with IV Dilaudid 0.5 mg every 3 hours as needed for severe pain, oxycodone 5 to 10 mg every 3 hours as needed for milder pain.- Pain improving   Slowly advancing diet, but patient has been maintaining pureed diet at home   Patient has had multiple episodes of diverticulitis over 9 or 10 years per her family  Will place outpatient referral for colorectal surgical evaluation on discharge  Daughter believes patient is being exposed to significant amounts of arsenic and that this is contributing to symptoms, will check level. - Pending

## 2020-02-29 NOTE — H&P
Hospital Medicine History & Physical Note    Date of Service  2/28/2020    Primary Care Physician  Armond Quevedo M.D.    Consultants  none    Code Status  Full code    Chief Complaint  Abdominal pain    History of Presenting Illness  64 y.o. female who presented 2/28/2020 with worsening left lower quadrant pain from diverticulitis despite being on antibiotics.  This patient has had multiple episodes of diverticulitis over the past 9 to 10 years.  She had symptoms again starting on February 24, she went to see her primary doctor the next day and was put on oral antibiotics which she started taking immediately but the pain did not get better and in fact worsened and today it became severe.  She presented to the emergency department for further evaluation.  She has not had any vomiting or blood per rectum or diarrhea but has not been eating very much and drinking very little and nauseated.    I discussed the presenting symptoms, physical examination, lab and radiological study results with the emergency department physician.      Review of Systems  Review of Systems   Constitutional: Negative.  Negative for chills, fever, malaise/fatigue and weight loss.   HENT: Negative.    Respiratory: Negative.  Negative for cough and shortness of breath.    Cardiovascular: Negative.  Negative for chest pain and leg swelling.   Gastrointestinal: Positive for abdominal pain and nausea. Negative for vomiting.   Genitourinary: Negative.  Negative for dysuria and flank pain.   Musculoskeletal: Negative.  Negative for back pain and myalgias.   Neurological: Negative.  Negative for dizziness, loss of consciousness and weakness.   Endo/Heme/Allergies: Negative.  Does not bruise/bleed easily.   Psychiatric/Behavioral: Negative.  Negative for depression. The patient is not nervous/anxious.    All other systems reviewed and are negative.      Past Medical History   has a past medical history of Diabetes mellitus type II (10/17/2012) and  Diverticulitis.    Surgical History   has a past surgical history that includes abdominal hysterectomy total and cholecystectomy.     Family History  family history is not on file.     Social History   reports that she has never smoked. She has never used smokeless tobacco. She reports that she does not drink alcohol or use drugs.    Allergies  No Known Allergies    Medications  Prior to Admission Medications   Prescriptions Last Dose Informant Patient Reported? Taking?   acetaminophen-codeine #3 (TYLENOL #3) 300-30 MG Tab 2/28/2020 at PRN Rx Bottle (For Med Information) Yes Yes   Sig: Take 1 Tab by mouth every four hours as needed for Moderate Pain.   metroNIDAZOLE (FLAGYL) 500 MG Tab 2/28/2020 at AM Rx Bottle (For Med Information) Yes Yes   Sig: Take 500 mg by mouth 3 times a day. 10-day course Starting 02/25/20 Ending 03/05/20.   sulfamethoxazole-trimethoprim (BACTRIM DS) 800-160 MG tablet 2/28/2020 at AM Rx Bottle (For Med Information) Yes Yes   Sig: Take 1 Tab by mouth 2 times a day. 10-day course Starting 02/25/20 Ending 03/05/20.      Facility-Administered Medications: None       Physical Exam  Temp:  [36.5 °C (97.7 °F)] 36.5 °C (97.7 °F)  Pulse:  [69-95] 78  Resp:  [16] 16  BP: (110-145)/(55-87) 122/60  SpO2:  [81 %-100 %] 100 %    Physical Exam  Vitals signs and nursing note reviewed.   Constitutional:       General: She is not in acute distress.     Appearance: Normal appearance. She is well-developed. She is not diaphoretic.   HENT:      Right Ear: External ear normal.      Left Ear: External ear normal.      Nose: Nose normal.      Mouth/Throat:      Pharynx: No oropharyngeal exudate.   Eyes:      General: No scleral icterus.        Right eye: No discharge.         Left eye: No discharge.      Conjunctiva/sclera: Conjunctivae normal.   Neck:      Vascular: No JVD.      Trachea: No tracheal deviation.   Cardiovascular:      Rate and Rhythm: Normal rate and regular rhythm.      Heart sounds: Normal heart  sounds.   Pulmonary:      Effort: Pulmonary effort is normal. No respiratory distress.      Breath sounds: Normal breath sounds. No stridor. No wheezing or rales.   Chest:      Chest wall: No tenderness.   Abdominal:      General: Bowel sounds are normal. There is distension.      Palpations: Abdomen is soft.      Tenderness: There is abdominal tenderness in the left lower quadrant. There is guarding.   Musculoskeletal:         General: No tenderness.   Skin:     General: Skin is warm and dry.      Capillary Refill: Capillary refill takes less than 2 seconds.      Coloration: Skin is not pale.   Neurological:      General: No focal deficit present.      Mental Status: She is alert and oriented to person, place, and time.      Cranial Nerves: No cranial nerve deficit.      Motor: No abnormal muscle tone.   Psychiatric:         Mood and Affect: Mood normal.         Behavior: Behavior normal.         Thought Content: Thought content normal.         Judgment: Judgment normal.         Laboratory:  Recent Labs     02/28/20  1501   WBC 8.5   RBC 4.46   HEMOGLOBIN 13.1   HEMATOCRIT 38.9   MCV 87.2   MCH 29.4   MCHC 33.7   RDW 38.6   PLATELETCT 238   MPV 8.7*     Recent Labs     02/28/20  1501   SODIUM 135   POTASSIUM 3.7   CHLORIDE 102   CO2 23   GLUCOSE 130*   BUN 8   CREATININE 0.87   CALCIUM 9.2     Recent Labs     02/28/20  1501   ALTSGPT 37   ASTSGOT 27   ALKPHOSPHAT 63   TBILIRUBIN 0.4   LIPASE 10*   GLUCOSE 130*         No results for input(s): NTPROBNP in the last 72 hours.      No results for input(s): TROPONINT in the last 72 hours.    Urinalysis:    Recent Labs     02/28/20  1620   SPECGRAVITY 1.004   GLUCOSEUR Negative   KETONES Negative   NITRITE Negative   LEUKESTERAS Negative        Imaging:  CT-ABDOMEN-PELVIS WITH   Final Result      1.  Sigmoid diverticulosis with mild inflammatory stranding in the area of the lower descending/upper sigmoid colon consistent with mild diverticulitis.      2.  Again seen  intrahepatic and extra hepatic biliary ductal dilatation likely related to prior cholecystectomy.            Assessment/Plan:  I anticipate this patient will require at least two midnights for appropriate medical management, necessitating inpatient admission.    Diverticulitis- (present on admission)  Assessment & Plan  Failed outpatient antibiotics therapy.  Admit for IV antibiotics, ceftriaxone and metronidazole.  Pain management with IV Dilaudid 0.5 mg every 3 hours as needed for severe pain, oxycodone 5 to 10 mg every 3 hours as needed for milder pain.  Patient has had multiple episodes of diverticulitis over 9 or 10 years per her family, I think she warrants an outpatient colorectal consultation to consider elective removal of the affected portion of the sigmoid colon.    Type 2 diabetes mellitus without complication, without long-term current use of insulin (HCC)- (present on admission)  Assessment & Plan  Diabetic diet, Accu-Cheks before meals and at bedtime.  Sliding scale insulin 1-6 units.        VTE prophylaxis: scd

## 2020-02-29 NOTE — CARE PLAN
Problem: Pain Management  Goal: Pain level will decrease to patient's comfort goal  Outcome: PROGRESSING AS EXPECTED    Pt stated that pain was at an acceptable level at this time. Will continue to monitor throughout shift.      Problem: Safety  Goal: Will remain free from injury  Outcome: PROGRESSING AS EXPECTED    Bed in locked and lowest position. Ambulates independently. Nonskid socks in place. Will continue to monitor.

## 2020-02-29 NOTE — CARE PLAN
Problem: Pain Management  Goal: Pain level will decrease to patient's comfort goal  Outcome: PROGRESSING AS EXPECTED  Note: Pt assessed for pain regularly and medicated PRN per MAR.       Problem: Knowledge Deficit  Goal: Knowledge of disease process/condition, treatment plan, diagnostic tests, and medications will improve  Outcome: PROGRESSING AS EXPECTED  Note: Pt educated regarding plan of care and medications. All questions answered.

## 2020-02-29 NOTE — ED NOTES
Med Rec Updated and Complete per Pt at bedside  Allergies Reviewed    Pt on 10-day courses of Flagyl 500mg three times daily and Bactrim DS twice daily Starting 02/25/20 Ending 03/05/20.     Pt denies OTC medication at this time.

## 2020-02-29 NOTE — PROGRESS NOTES
2 RN skin check completed with Rei RN   Devices in place N/A.  Skin assessed under devices N/A.  Confirmed pressure ulcers found on N/A.  The following interventions in place: pt able to turn self. Ambulates frequently.     Scattered bruises otherwise skin intact.

## 2020-02-29 NOTE — ED NOTES
Hand off report given to Reena RN   Patient chart and current status reviewed with oncoming RN and all questions answered  This RN's primary care of patient terminated at this time

## 2020-02-29 NOTE — PROGRESS NOTES
Hospital Medicine Daily Progress Note    Date of Service  2/29/2020    Chief Complaint  64 y.o. female admitted 2/28/2020 with abdominal pain     Hospital Course    Patient is a 64-year-old female with known medical history of abdominal hysterectomy, cholecystectomy, diabetes type 2 and diverticulitis.  Patient presents to the emergency room with worsening left lower quadrant pain from diverticulitis despite being on outpatient antibiotics.  Patient has had multiple episodes of diverticulitis and has increased over the last year.  Patient went to see her primary care doctor and was placed on oral antibiotics however despite taking these faithfully and sticking to recommended diet she continues to have worsened pain and presents to the emergency room for further evaluation and management.  Patient was admitted to medical floor and initiated on Rocephin and Flagyl therapy after CAT scan showed sigmoid diverticulosis with mild inflammatory stranding in the area of the lower descending/upper sigmoid colon consistent with mild diverticulitis.  Patient was placed on liquid diet and provided pain management PRN.       Interval Problem Update  2/29- Patient resting in bed, states continued left lower quadrant pain. Long discussion with  and family regarding details of the last years worth of symptoms. Patient states the pain is affecting all aspects of her life and she is sick of it and wants to pursue surgical options. Discussed we will refer her to surgeon on discharge but need to calm and clear infection at this time. Patient verbalized understanding. Daughter at bedside states she is significantly concerned with where patient lives and high levels of arsenic in area. Will check arsenic level as some symptoms from prolonged exposure where noted to be digestive issues. Continue Rocephin and Flagyl, IV fluids and liquid diet at this time. Patient and family on board with plan of care and all questions answered.      Consultants/Specialty  None     Code Status  Full     Disposition  Continue with IV antibiotics and symptom management     Review of Systems  Review of Systems   Constitutional: Positive for malaise/fatigue and weight loss. Negative for chills and fever.   HENT: Negative for congestion and sore throat.    Eyes: Negative for pain and discharge.   Respiratory: Negative for cough and shortness of breath.    Cardiovascular: Negative for chest pain and leg swelling.   Gastrointestinal: Positive for abdominal pain, constipation, nausea and vomiting. Negative for blood in stool.   Genitourinary: Negative for dysuria, frequency and urgency.   Musculoskeletal: Positive for myalgias.   Skin: Positive for itching.   Neurological: Negative for dizziness and headaches.   Psychiatric/Behavioral: Negative for depression. The patient is nervous/anxious.         Physical Exam  Temp:  [36.1 °C (97 °F)-36.5 °C (97.7 °F)] 36.3 °C (97.4 °F)  Pulse:  [62-95] 74  Resp:  [16-19] 18  BP: (100-145)/(45-87) 101/58  SpO2:  [81 %-100 %] 98 %    Physical Exam  Vitals signs and nursing note reviewed.   Constitutional:       General: She is awake.      Appearance: She is overweight. She is not ill-appearing.   HENT:      Head: Normocephalic and atraumatic.      Mouth/Throat:      Lips: Pink.      Mouth: Mucous membranes are moist.   Eyes:      General: Lids are normal.      Conjunctiva/sclera: Conjunctivae normal.   Cardiovascular:      Rate and Rhythm: Normal rate.      Heart sounds: Normal heart sounds. No friction rub.   Pulmonary:      Effort: Pulmonary effort is normal. No respiratory distress.      Breath sounds: Normal breath sounds. No decreased breath sounds or wheezing.   Abdominal:      General: Bowel sounds are decreased.      Palpations: Abdomen is soft.      Tenderness: There is generalized abdominal tenderness and tenderness in the left lower quadrant.   Musculoskeletal:      Comments: MEADOWS   Skin:     General: Skin is warm and  dry.   Neurological:      Mental Status: She is alert and oriented to person, place, and time.      Gait: Gait is intact.   Psychiatric:         Attention and Perception: Attention normal.         Mood and Affect: Mood is anxious.         Speech: Speech normal.         Behavior: Behavior is cooperative.         Cognition and Memory: Cognition normal.      Comments: Patient is anxious over continued recurrence of symptoms and wants to be referred to colorectal surgeon for surgical management          Fluids    Intake/Output Summary (Last 24 hours) at 2/29/2020 1112  Last data filed at 2/29/2020 0915  Gross per 24 hour   Intake 240 ml   Output --   Net 240 ml       Laboratory  Recent Labs     02/28/20  1501 02/29/20  0307   WBC 8.5 7.2   RBC 4.46 4.32   HEMOGLOBIN 13.1 12.9   HEMATOCRIT 38.9 39.5   MCV 87.2 91.4   MCH 29.4 29.9   MCHC 33.7 32.7*   RDW 38.6 40.6   PLATELETCT 238 219   MPV 8.7* 8.8*     Recent Labs     02/28/20  1501 02/29/20  0307   SODIUM 135 139   POTASSIUM 3.7 4.0   CHLORIDE 102 106   CO2 23 23   GLUCOSE 130* 111*   BUN 8 7*   CREATININE 0.87 0.79   CALCIUM 9.2 9.1                   Imaging  CT-ABDOMEN-PELVIS WITH   Final Result      1.  Sigmoid diverticulosis with mild inflammatory stranding in the area of the lower descending/upper sigmoid colon consistent with mild diverticulitis.      2.  Again seen intrahepatic and extra hepatic biliary ductal dilatation likely related to prior cholecystectomy.           Assessment/Plan  Diverticulitis- (present on admission)  Assessment & Plan  Failed outpatient antibiotics therapy.  Admit for IV antibiotics, ceftriaxone and metronidazole.  Pain management with IV Dilaudid 0.5 mg every 3 hours as needed for severe pain, oxycodone 5 to 10 mg every 3 hours as needed for milder pain.  Patient has had multiple episodes of diverticulitis over 9 or 10 years per her family  Will place outpatient referral for colorectal surgical evaluation on discharge  Daughter  believes patient is being exposed to significant amounts of arsenic and that this is contributing to symptoms, will check level.     Type 2 diabetes mellitus without complication, without long-term current use of insulin (HCC)- (present on admission)  Assessment & Plan  Diabetic diet, Accu-Cheks before meals and at bedtime.  Sliding scale insulin 1-6 units.  Hypoglycemic protocol as needed          VTE prophylaxis: SCD

## 2020-02-29 NOTE — PROGRESS NOTES
Bedside report received. Pt is A&Ox4, Pt was complaining of left lower abdominal pain. Per pt report, pt was trying to have BM. Pt was administered Mirilax per MAR.  Pt was able to have BM, per pt report only slight abdominal pain still present. Pt assessed for pain regularly and medicated PRN per MAR. Pt was ambulating around unit, pt is steady and tolerates activity well. Family members at bedside and able to assist with communication from Jamaican to english regarding pt' s POC. POC discussed with pt; all questions answered at this time.

## 2020-02-29 NOTE — ASSESSMENT & PLAN NOTE
Diabetic diet, Accu-Cheks before meals and at bedtime.  Sliding scale insulin 1-6 units.  Hypoglycemic protocol as needed

## 2020-02-29 NOTE — ED NOTES
Patient was escorted back to ED room from the ED Lobby     Patient arrives to ER room from triage.   This RN agrees with previously documented triage note and assessment.  Patient updated on process and plan of care  Awaiting MD evaluation

## 2020-02-29 NOTE — PROGRESS NOTES
Pt oriented to room and educated on call bell use. Pt verbalized understanding. Admission screen and 2 RN skin check completed. Updated on plan of care and medications. Pt verbalized understanding. Pt stated that she no longer takes medication for diabetes and does not want blood sugar checks or insulin. Pt daughter stated that she lives in [place] and is exposed to arsenic daily. Pt daughter wanted a test to check the levels in her blood. Explained to daughter that I will pass on to the day shift RN. No other complaints at this time. Pt level is appropriate for her at this time. IV ABX given as scheduled. Ambulates independently. Fall precautions in place. Call bell and bedside table within reach. Will continue to monitor.

## 2020-02-29 NOTE — ED NOTES
Sleeping, wakes easily   Hourly rounding completed  Patient is up to date on current plan of care  Patient is resting comfortably in bed   Patient denies any need for use of the restroom, denies need for repositioning, denies need for any comfort care at this time

## 2020-02-29 NOTE — ED PROVIDER NOTES
ED Provider Note    CHIEF COMPLAINT  Chief Complaint   Patient presents with   • Abdominal Pain     x 1 week       HPI  Leti Arana is a 64 y.o. female who presents for evaluation of persistent left lower quadrant pain, cramping.  The patient was seen by her PCP.  She has prior history of diverticulitis.  She was started on oral Bactrim and Flagyl about 4 to 5 days ago.  She reports no improvement.  She denies black or bloody stools.  She reports dull 7 out of 10 left lower quadrant pain.  No hematemesis hematochezia.  No other symptoms reported.  Nothing seems to make the pain better.  Eating and bowel movements make it worse.  The patient apparently was previously diabetic but her PCP ran some tests recently and took her off her medications.    REVIEW OF SYSTEMS  See HPI for further details.  No high fevers chills night sweats weight loss numbness tingling weakness rash all other systems are negative.     PAST MEDICAL HISTORY  Past Medical History:   Diagnosis Date   • Diabetes mellitus type II 10/17/2012   • Diverticulitis        FAMILY HISTORY  Noncontributory    SOCIAL HISTORY  Social History     Socioeconomic History   • Marital status:      Spouse name: Not on file   • Number of children: Not on file   • Years of education: Not on file   • Highest education level: Not on file   Occupational History   • Not on file   Social Needs   • Financial resource strain: Not on file   • Food insecurity     Worry: Not on file     Inability: Not on file   • Transportation needs     Medical: Not on file     Non-medical: Not on file   Tobacco Use   • Smoking status: Never Smoker   • Smokeless tobacco: Never Used   Substance and Sexual Activity   • Alcohol use: No   • Drug use: No   • Sexual activity: Not on file   Lifestyle   • Physical activity     Days per week: Not on file     Minutes per session: Not on file   • Stress: Not on file   Relationships   • Social connections     Talks on phone: Not on file      "Gets together: Not on file     Attends Druze service: Not on file     Active member of club or organization: Not on file     Attends meetings of clubs or organizations: Not on file     Relationship status: Not on file   • Intimate partner violence     Fear of current or ex partner: Not on file     Emotionally abused: Not on file     Physically abused: Not on file     Forced sexual activity: Not on file   Other Topics Concern   • Not on file   Social History Narrative   • Not on file       SURGICAL HISTORY  Past Surgical History:   Procedure Laterality Date   • ABDOMINAL HYSTERECTOMY TOTAL      total   • CHOLECYSTECTOMY         CURRENT MEDICATIONS  Home Medications    **Home medications have not yet been reviewed for this encounter**         ALLERGIES  No Known Allergies    PHYSICAL EXAM  VITAL SIGNS: /60   Pulse 78   Temp 36.5 °C (97.7 °F) (Temporal)   Resp 16   Ht 1.651 m (5' 5\")   Wt 74.8 kg (164 lb 14.5 oz)   SpO2 100%   BMI 27.44 kg/m²       Constitutional: Well developed, Well nourished, No acute distress, Non-toxic appearance.   HENT: Normocephalic, Atraumatic, Bilateral external ears normal, Oropharynx moist, No oral exudates, Nose normal.   Eyes: PERRLA, EOMI, Conjunctiva normal, No discharge.   Neck: Normal range of motion, No tenderness, Supple, No stridor.   Cardiovascular: Normal heart rate, Normal rhythm, No murmurs, No rubs, No gallops.   Thorax & Lungs: Normal breath sounds, No respiratory distress, No wheezing, No chest tenderness.   Abdomen: Bowel sounds normal, Soft, left lower quadrant discomfort, no rebound or guarding no hernias or masses  Skin: Warm, Dry, No erythema, No rash.   Back: No tenderness, No CVA tenderness.   Extremities: Intact distal pulses, No edema, No tenderness, No cyanosis, No clubbing.   Neurologic: Alert & oriented x 3, Normal motor function, Normal sensory function, No focal deficits noted.   Psychiatric: Anxious    CT-ABDOMEN-PELVIS WITH   Final Result    "   1.  Sigmoid diverticulosis with mild inflammatory stranding in the area of the lower descending/upper sigmoid colon consistent with mild diverticulitis.      2.  Again seen intrahepatic and extra hepatic biliary ductal dilatation likely related to prior cholecystectomy.        Results for orders placed or performed during the hospital encounter of 02/28/20   CBC WITH DIFFERENTIAL   Result Value Ref Range    WBC 8.5 4.8 - 10.8 K/uL    RBC 4.46 4.20 - 5.40 M/uL    Hemoglobin 13.1 12.0 - 16.0 g/dL    Hematocrit 38.9 37.0 - 47.0 %    MCV 87.2 81.4 - 97.8 fL    MCH 29.4 27.0 - 33.0 pg    MCHC 33.7 33.6 - 35.0 g/dL    RDW 38.6 35.9 - 50.0 fL    Platelet Count 238 164 - 446 K/uL    MPV 8.7 (L) 9.0 - 12.9 fL    Neutrophils-Polys 56.40 44.00 - 72.00 %    Lymphocytes 34.40 22.00 - 41.00 %    Monocytes 6.70 0.00 - 13.40 %    Eosinophils 1.30 0.00 - 6.90 %    Basophils 0.60 0.00 - 1.80 %    Immature Granulocytes 0.60 0.00 - 0.90 %    Nucleated RBC 0.00 /100 WBC    Neutrophils (Absolute) 4.82 2.00 - 7.15 K/uL    Lymphs (Absolute) 2.94 1.00 - 4.80 K/uL    Monos (Absolute) 0.57 0.00 - 0.85 K/uL    Eos (Absolute) 0.11 0.00 - 0.51 K/uL    Baso (Absolute) 0.05 0.00 - 0.12 K/uL    Immature Granulocytes (abs) 0.05 0.00 - 0.11 K/uL    NRBC (Absolute) 0.00 K/uL   COMP METABOLIC PANEL   Result Value Ref Range    Sodium 135 135 - 145 mmol/L    Potassium 3.7 3.6 - 5.5 mmol/L    Chloride 102 96 - 112 mmol/L    Co2 23 20 - 33 mmol/L    Anion Gap 10.0 0.0 - 11.9    Glucose 130 (H) 65 - 99 mg/dL    Bun 8 8 - 22 mg/dL    Creatinine 0.87 0.50 - 1.40 mg/dL    Calcium 9.2 8.5 - 10.5 mg/dL    AST(SGOT) 27 12 - 45 U/L    ALT(SGPT) 37 2 - 50 U/L    Alkaline Phosphatase 63 30 - 99 U/L    Total Bilirubin 0.4 0.1 - 1.5 mg/dL    Albumin 3.9 3.2 - 4.9 g/dL    Total Protein 7.3 6.0 - 8.2 g/dL    Globulin 3.4 1.9 - 3.5 g/dL    A-G Ratio 1.1 g/dL   LIPASE   Result Value Ref Range    Lipase 10 (L) 11 - 82 U/L   URINALYSIS,CULTURE IF INDICATED   Result Value  Ref Range    Color Yellow     Character Clear     Specific Gravity 1.004 <1.035    Ph 7.0 5.0 - 8.0    Glucose Negative Negative mg/dL    Ketones Negative Negative mg/dL    Protein Negative Negative mg/dL    Bilirubin Negative Negative    Urobilinogen, Urine 0.2 Negative    Nitrite Negative Negative    Leukocyte Esterase Negative Negative    Occult Blood Negative Negative    Micro Urine Req see below    ESTIMATED GFR   Result Value Ref Range    GFR If African American >60 >60 mL/min/1.73 m 2    GFR If Non African American >60 >60 mL/min/1.73 m 2        COURSE & MEDICAL DECISION MAKING  Pertinent Labs & Imaging studies reviewed. (See chart for details)  An IV was established.  Reviewed the patient's records.  She has exam concerning for diverticulitis.  Her laboratory studies are reassuring but she has clinical evidence of diverticulitis on exam and on CT scan.  She is already been on oral antibiotics for 4 to 5 days with no improvement.  There is no suggestion of abscess or perforation to merit surgical consult.  She has failed outpatient management however and will be admitted to internal medicine for bowel rest IV antibiotics    FINAL IMPRESSION  1.  Acute diverticulitis failed outpatient management         Electronically signed by: Ehsan Solo M.D., 2/28/2020 4:25 PM

## 2020-02-29 NOTE — CARE PLAN
Problem: Communication  Goal: The ability to communicate needs accurately and effectively will improve  Outcome: PROGRESSING AS EXPECTED  Note: Pt was encouraged to voice feelings, therapeutic communication was utilized. Family members present at bedside and able to assist with communication from Nigerian to english regarding pt' s POC.     Problem: Knowledge Deficit  Goal: Knowledge of disease process/condition, treatment plan, diagnostic tests, and medications will improve  Outcome: PROGRESSING AS EXPECTED  Note: Pt educated regarding plan of care and medications. All questions answered.

## 2020-02-29 NOTE — ED NOTES
RN attempted to have pt provide a urine sample, patient states she just urinated 30 minutes ago and is refusing to attempt collection at this may e  Changing into a hospital gown for assessment     Vital Signs stable   A&Ox4  Skin wpd  GCS 15  Respirations even and unlabored   No acute distress noted at this time   Triage completed, awaiting ED MD evaluation

## 2020-03-01 LAB
ANION GAP SERPL CALC-SCNC: 7 MMOL/L (ref 0–11.9)
BUN SERPL-MCNC: 6 MG/DL (ref 8–22)
CALCIUM SERPL-MCNC: 9.2 MG/DL (ref 8.5–10.5)
CHLORIDE SERPL-SCNC: 104 MMOL/L (ref 96–112)
CO2 SERPL-SCNC: 26 MMOL/L (ref 20–33)
CREAT SERPL-MCNC: 0.64 MG/DL (ref 0.5–1.4)
ERYTHROCYTE [DISTWIDTH] IN BLOOD BY AUTOMATED COUNT: 39.6 FL (ref 35.9–50)
GLUCOSE SERPL-MCNC: 96 MG/DL (ref 65–99)
HCT VFR BLD AUTO: 39.3 % (ref 37–47)
HGB BLD-MCNC: 13.2 G/DL (ref 12–16)
MCH RBC QN AUTO: 30.3 PG (ref 27–33)
MCHC RBC AUTO-ENTMCNC: 33.6 G/DL (ref 33.6–35)
MCV RBC AUTO: 90.3 FL (ref 81.4–97.8)
PLATELET # BLD AUTO: 212 K/UL (ref 164–446)
PMV BLD AUTO: 8.9 FL (ref 9–12.9)
POTASSIUM SERPL-SCNC: 3.7 MMOL/L (ref 3.6–5.5)
RBC # BLD AUTO: 4.35 M/UL (ref 4.2–5.4)
SODIUM SERPL-SCNC: 137 MMOL/L (ref 135–145)
WBC # BLD AUTO: 6.6 K/UL (ref 4.8–10.8)

## 2020-03-01 PROCEDURE — 770006 HCHG ROOM/CARE - MED/SURG/GYN SEMI*

## 2020-03-01 PROCEDURE — 700101 HCHG RX REV CODE 250: Performed by: HOSPITALIST

## 2020-03-01 PROCEDURE — 700111 HCHG RX REV CODE 636 W/ 250 OVERRIDE (IP): Performed by: HOSPITALIST

## 2020-03-01 PROCEDURE — 80048 BASIC METABOLIC PNL TOTAL CA: CPT

## 2020-03-01 PROCEDURE — 99232 SBSQ HOSP IP/OBS MODERATE 35: CPT | Performed by: HOSPITALIST

## 2020-03-01 PROCEDURE — 36415 COLL VENOUS BLD VENIPUNCTURE: CPT

## 2020-03-01 PROCEDURE — 700105 HCHG RX REV CODE 258: Performed by: HOSPITALIST

## 2020-03-01 PROCEDURE — 85027 COMPLETE CBC AUTOMATED: CPT

## 2020-03-01 PROCEDURE — 82175 ASSAY OF ARSENIC: CPT

## 2020-03-01 RX ADMIN — CEFTRIAXONE SODIUM 1 G: 1 INJECTION, POWDER, FOR SOLUTION INTRAMUSCULAR; INTRAVENOUS at 19:39

## 2020-03-01 RX ADMIN — METRONIDAZOLE 500 MG: 500 INJECTION, SOLUTION INTRAVENOUS at 14:10

## 2020-03-01 RX ADMIN — METRONIDAZOLE 500 MG: 500 INJECTION, SOLUTION INTRAVENOUS at 22:00

## 2020-03-01 RX ADMIN — METRONIDAZOLE 500 MG: 500 INJECTION, SOLUTION INTRAVENOUS at 04:54

## 2020-03-01 ASSESSMENT — COGNITIVE AND FUNCTIONAL STATUS - GENERAL
MOBILITY SCORE: 24
SUGGESTED CMS G CODE MODIFIER MOBILITY: CH
DAILY ACTIVITIY SCORE: 24
SUGGESTED CMS G CODE MODIFIER DAILY ACTIVITY: CH

## 2020-03-01 ASSESSMENT — ENCOUNTER SYMPTOMS
NAUSEA: 0
SORE THROAT: 0
FEVER: 0
VOMITING: 0
MYALGIAS: 1
WEIGHT LOSS: 1
CONSTIPATION: 0
COUGH: 0
DEPRESSION: 0
SHORTNESS OF BREATH: 0
BLOOD IN STOOL: 0
CHILLS: 0
ABDOMINAL PAIN: 1
NERVOUS/ANXIOUS: 1
HEADACHES: 0
EYE DISCHARGE: 0
EYE PAIN: 0
DIZZINESS: 0

## 2020-03-01 NOTE — PROGRESS NOTES
Hospital Medicine Daily Progress Note    Date of Service  3/1/2020    Chief Complaint  64 y.o. female admitted 2/28/2020 with abdominal pain     Hospital Course    Patient is a 64-year-old female with known medical history of abdominal hysterectomy, cholecystectomy, diabetes type 2 and diverticulitis.  Patient presents to the emergency room with worsening left lower quadrant pain from diverticulitis despite being on outpatient antibiotics.  Patient has had multiple episodes of diverticulitis and has increased over the last year.  Patient went to see her primary care doctor and was placed on oral antibiotics however despite taking these faithfully and sticking to recommended diet she continues to have worsened pain and presents to the emergency room for further evaluation and management.  Patient was admitted to medical floor and initiated on Rocephin and Flagyl therapy after CAT scan showed sigmoid diverticulosis with mild inflammatory stranding in the area of the lower descending/upper sigmoid colon consistent with mild diverticulitis.  Patient was placed on liquid diet and provided pain management PRN.       Interval Problem Update  2/29- Patient resting in bed, states continued left lower quadrant pain. Long discussion with  and family regarding details of the last years worth of symptoms. Patient states the pain is affecting all aspects of her life and she is sick of it and wants to pursue surgical options. Discussed we will refer her to surgeon on discharge but need to calm and clear infection at this time. Patient verbalized understanding. Daughter at bedside states she is significantly concerned with where patient lives and high levels of arsenic in area. Will check arsenic level as some symptoms from prolonged exposure where noted to be digestive issues. Continue Rocephin and Flagyl, IV fluids and liquid diet at this time. Patient and family on board with plan of care and all questions answered.    3/1- Patient resting in bed, states she tolerated lunch but is still having pain and is worried to go home and not have it controlled. Will continue with antibiotic therapy and advanced diet and watch patient overnight. Patient endorsed 4 bowel movements today. Likely discharge tomorrow.     Consultants/Specialty  None     Code Status  Full     Disposition  Continue with IV antibiotics and symptom management - likely discharge in am if pain is controlled      Review of Systems  Review of Systems   Constitutional: Positive for malaise/fatigue and weight loss. Negative for chills and fever.   HENT: Negative for congestion and sore throat.    Eyes: Negative for pain and discharge.   Respiratory: Negative for cough and shortness of breath.    Cardiovascular: Negative for chest pain and leg swelling.   Gastrointestinal: Positive for abdominal pain (improving ). Negative for blood in stool, constipation, nausea and vomiting.   Genitourinary: Negative for dysuria, frequency and urgency.   Musculoskeletal: Positive for myalgias.   Skin: Negative for itching.   Neurological: Negative for dizziness and headaches.   Psychiatric/Behavioral: Negative for depression. The patient is nervous/anxious.         Physical Exam  Temp:  [36.3 °C (97.4 °F)-36.4 °C (97.6 °F)] 36.3 °C (97.4 °F)  Pulse:  [70-81] 81  Resp:  [18] 18  BP: (111-113)/(53-59) 113/53  SpO2:  [95 %-96 %] 95 %    Physical Exam  Vitals signs and nursing note reviewed.   Constitutional:       General: She is awake.      Appearance: She is overweight. She is not ill-appearing.   HENT:      Head: Normocephalic and atraumatic.      Mouth/Throat:      Lips: Pink.      Mouth: Mucous membranes are moist.   Eyes:      General: Lids are normal.      Conjunctiva/sclera: Conjunctivae normal.   Cardiovascular:      Rate and Rhythm: Normal rate.      Heart sounds: Normal heart sounds. No friction rub.   Pulmonary:      Effort: Pulmonary effort is normal. No respiratory distress.       Breath sounds: Normal breath sounds. No decreased breath sounds or wheezing.   Abdominal:      General: Bowel sounds are normal.      Palpations: Abdomen is soft.      Tenderness: There is generalized abdominal tenderness (improved ) and tenderness in the left lower quadrant.   Musculoskeletal:      Comments: ABDIEL   Skin:     General: Skin is warm and dry.   Neurological:      Mental Status: She is alert and oriented to person, place, and time.      Gait: Gait is intact.   Psychiatric:         Attention and Perception: Attention normal.         Mood and Affect: Mood is anxious.         Speech: Speech normal.         Behavior: Behavior is cooperative.         Cognition and Memory: Cognition normal.      Comments: Patient is anxious about going home and not having pain controlled          Fluids    Intake/Output Summary (Last 24 hours) at 3/1/2020 1518  Last data filed at 3/1/2020 0859  Gross per 24 hour   Intake 360 ml   Output --   Net 360 ml       Laboratory  Recent Labs     02/28/20  1501 02/29/20  0307 03/01/20  0307   WBC 8.5 7.2 6.6   RBC 4.46 4.32 4.35   HEMOGLOBIN 13.1 12.9 13.2   HEMATOCRIT 38.9 39.5 39.3   MCV 87.2 91.4 90.3   MCH 29.4 29.9 30.3   MCHC 33.7 32.7* 33.6   RDW 38.6 40.6 39.6   PLATELETCT 238 219 212   MPV 8.7* 8.8* 8.9*     Recent Labs     02/28/20  1501 02/29/20  0307 03/01/20  0307   SODIUM 135 139 137   POTASSIUM 3.7 4.0 3.7   CHLORIDE 102 106 104   CO2 23 23 26   GLUCOSE 130* 111* 96   BUN 8 7* 6*   CREATININE 0.87 0.79 0.64   CALCIUM 9.2 9.1 9.2                   Imaging  CT-ABDOMEN-PELVIS WITH   Final Result      1.  Sigmoid diverticulosis with mild inflammatory stranding in the area of the lower descending/upper sigmoid colon consistent with mild diverticulitis.      2.  Again seen intrahepatic and extra hepatic biliary ductal dilatation likely related to prior cholecystectomy.           Assessment/Plan  Diverticulitis- (present on admission)  Assessment & Plan  Failed outpatient  antibiotics therapy.  Admit for IV antibiotics, ceftriaxone and metronidazole.  Pain management with IV Dilaudid 0.5 mg every 3 hours as needed for severe pain, oxycodone 5 to 10 mg every 3 hours as needed for milder pain.- Pain improving   Slowly advancing diet, but patient has been maintaining pureed diet at home   Patient has had multiple episodes of diverticulitis over 9 or 10 years per her family  Will place outpatient referral for colorectal surgical evaluation on discharge  Daughter believes patient is being exposed to significant amounts of arsenic and that this is contributing to symptoms, will check level. - Pending     Left lower quadrant pain- (present on admission)  Assessment & Plan  Related to diverticulitis  Improving  Slowly advancing diet     Obesity (BMI 30-39.9)- (present on admission)  Assessment & Plan  Patient to follow up as outpatient with PCP  Dietary and lifestyle modifications  Body mass index is 26.71 kg/m².      Type 2 diabetes mellitus without complication, without long-term current use of insulin (HCC)- (present on admission)  Assessment & Plan  Diabetic diet, Accu-Cheks before meals and at bedtime.  Sliding scale insulin 1-6 units.  Hypoglycemic protocol as needed          VTE prophylaxis: SCD

## 2020-03-01 NOTE — CARE PLAN
Problem: Communication  Goal: The ability to communicate needs accurately and effectively will improve  Outcome: PROGRESSING AS EXPECTED    Pt able to verbalize needs.      Problem: Knowledge Deficit  Goal: Knowledge of disease process/condition, treatment plan, diagnostic tests, and medications will improve  Outcome: PROGRESSING AS EXPECTED    Updated on plan of care and medications. Pt verbalized understanding.

## 2020-03-01 NOTE — ASSESSMENT & PLAN NOTE
Patient to follow up as outpatient with PCP  Dietary and lifestyle modifications  Body mass index is 26.71 kg/m².

## 2020-03-01 NOTE — PROGRESS NOTES
Pt resting in bed throughout shift. Heat packs given for abd pain. Pt stated that her pain is at a tolerable level. IV ABX given as ordered. Updated on plan of care and medications. No other complaints at this time. Ambulates independently. Fall precautions in place. Call bell and bedside table within reach. Hourly rounding completed. Will continue to monitor.

## 2020-03-01 NOTE — DISCHARGE SUMMARY
Discharge Summary    CHIEF COMPLAINT ON ADMISSION  Chief Complaint   Patient presents with   • Abdominal Pain     x 1 week       Reason for Admission  Abd Pain     Admission Date  2/28/2020    CODE STATUS  Full Code    HPI & HOSPITAL COURSE    Patient is a 64-year-old female with known medical history of abdominal hysterectomy, cholecystectomy, diabetes type 2 and diverticulitis.  Patient presents to the emergency room with worsening left lower quadrant pain from diverticulitis despite being on outpatient antibiotics.  Patient has had multiple episodes of diverticulitis and has increased over the last year.  Patient went to see her primary care doctor and was placed on oral antibiotics however despite taking these faithfully and sticking to recommended diet she continues to have worsened pain and presents to the emergency room for further evaluation and management.  Patient was admitted to medical floor and initiated on Rocephin and Flagyl therapy after CAT scan showed sigmoid diverticulosis with mild inflammatory stranding in the area of the lower descending/upper sigmoid colon consistent with mild diverticulitis.  Patient was placed on liquid diet and provided pain management PRN.  Long discussion was had with patient and family regarding last few years of her symptoms and patient stated it is interfering with all aspects of her life and wants to be referred to colorectal surgery outpatient to explore options.  Patient was continued on symptom management and improved significantly with pain.  Patient started having bowel movements and was able to have her diet advanced to full liquid/GI soft which is what she is eating at home.  Patient's daughter was very concerned that there is significant amounts of arsenic in the water where she lives and that this is contributing to her GI symptoms.  Urine arsenic levels ordered and is pending at this time.  On exam today patient states pain is minimal and she feels she is  ready to discharge at this time.  Long discussion regarding diet and follow-up with  and patient.  Patient verbalized understanding.  Patient will continue on previous treatment with Bactrim and Flagyl p.o. for total of 10 days treatment.  Patient has been provided multiple numbers for surgeons to follow-up and receive recommendations for further treatment.  Patient is up ambulating independently.  Patient is maintaining adequate p.o. intake with no nausea, vomiting or significant pain.  Patient's vital signs are stable and patient feels she is ready for discharge at this time.    Therefore, she is discharged in good and stable condition to home with close outpatient follow-up.    The patient met 2-midnight criteria for an inpatient stay at the time of discharge.    Discharge Date  3/2/2020    FOLLOW UP ITEMS POST DISCHARGE  Please follow up with Surgeon  Take all medications as prescribed  Stay well hydrated   Continue on current modified diet at home     DISCHARGE DIAGNOSES  Active Problems:    Diverticulitis POA: Yes    Type 2 diabetes mellitus without complication, without long-term current use of insulin (HCC) POA: Yes      Overview: ICD-10 transition  Resolved Problems:    * No resolved hospital problems. *      FOLLOW UP    [facility]  [address]  [address]  [place] [address] [phone]    Call in 1 week  to make appointment with Colorectal Surgeon regarding diverticulitis treatment options    [facility]  [address]  [address]  [place] [address] [phone]  Call  to establish with colorectal surgeon for options regarding diverticulitis management       MEDICATIONS ON DISCHARGE     Medication List      CONTINUE taking these medications      Instructions   acetaminophen-codeine #3 300-30 MG Tabs  Commonly known as:  TYLENOL #3   Take 1 Tab by mouth every four hours as needed for Moderate Pain.  Dose:  1 Tab     metroNIDAZOLE 500 MG Tabs  Commonly  known as:  FLAGYL   Take 1 Tab by mouth 3 times a day for 2 days. 10-day course Starting 02/25/20 Ending 03/05/20.  Dose:  500 mg     sulfamethoxazole-trimethoprim 800-160 MG tablet  Commonly known as:  BACTRIM DS   Take 1 Tab by mouth 2 times a day for 2 days. 10-day course Starting 02/25/20 Ending 03/05/20.  Dose:  1 Tab            Allergies  No Known Allergies    DIET  Orders Placed This Encounter   Procedures   • Diet Order Full Liquid (patient would prefer less sweetened liquids )     Standing Status:   Standing     Number of Occurrences:   1     Order Specific Question:   Diet:     Answer:   Full Liquid [11]     Comments:   patient would prefer less sweetened liquids        ACTIVITY  As tolerated.  Weight bearing as tolerated    CONSULTATIONS  None     PROCEDURES  None     LABORATORY  Lab Results   Component Value Date    SODIUM 137 03/01/2020    POTASSIUM 3.7 03/01/2020    CHLORIDE 104 03/01/2020    CO2 26 03/01/2020    GLUCOSE 96 03/01/2020    BUN 6 (L) 03/01/2020    CREATININE 0.64 03/01/2020        Lab Results   Component Value Date    WBC 6.6 03/01/2020    HEMOGLOBIN 13.2 03/01/2020    HEMATOCRIT 39.3 03/01/2020    PLATELETCT 212 03/01/2020        Total time of the discharge process exceeds 38 minutes.

## 2020-03-02 ENCOUNTER — PATIENT OUTREACH (OUTPATIENT)
Dept: HEALTH INFORMATION MANAGEMENT | Facility: OTHER | Age: 65
End: 2020-03-02

## 2020-03-02 VITALS
DIASTOLIC BLOOD PRESSURE: 52 MMHG | RESPIRATION RATE: 17 BRPM | OXYGEN SATURATION: 96 % | HEIGHT: 65 IN | BODY MASS INDEX: 26.74 KG/M2 | SYSTOLIC BLOOD PRESSURE: 119 MMHG | HEART RATE: 84 BPM | TEMPERATURE: 97.5 F | WEIGHT: 160.5 LBS

## 2020-03-02 LAB
ANION GAP SERPL CALC-SCNC: 9 MMOL/L (ref 0–11.9)
BUN SERPL-MCNC: 6 MG/DL (ref 8–22)
CALCIUM SERPL-MCNC: 9.2 MG/DL (ref 8.5–10.5)
CHLORIDE SERPL-SCNC: 104 MMOL/L (ref 96–112)
CO2 SERPL-SCNC: 27 MMOL/L (ref 20–33)
CREAT SERPL-MCNC: 0.64 MG/DL (ref 0.5–1.4)
ERYTHROCYTE [DISTWIDTH] IN BLOOD BY AUTOMATED COUNT: 39.9 FL (ref 35.9–50)
EST. AVERAGE GLUCOSE BLD GHB EST-MCNC: 111 MG/DL
GLUCOSE SERPL-MCNC: 123 MG/DL (ref 65–99)
HBA1C MFR BLD: 5.5 % (ref 0–5.6)
HCT VFR BLD AUTO: 41.2 % (ref 37–47)
HGB BLD-MCNC: 13.4 G/DL (ref 12–16)
MCH RBC QN AUTO: 29.6 PG (ref 27–33)
MCHC RBC AUTO-ENTMCNC: 32.5 G/DL (ref 33.6–35)
MCV RBC AUTO: 90.9 FL (ref 81.4–97.8)
PLATELET # BLD AUTO: 227 K/UL (ref 164–446)
PMV BLD AUTO: 8.6 FL (ref 9–12.9)
POTASSIUM SERPL-SCNC: 3.7 MMOL/L (ref 3.6–5.5)
RBC # BLD AUTO: 4.53 M/UL (ref 4.2–5.4)
SODIUM SERPL-SCNC: 140 MMOL/L (ref 135–145)
WBC # BLD AUTO: 7.9 K/UL (ref 4.8–10.8)

## 2020-03-02 PROCEDURE — 83036 HEMOGLOBIN GLYCOSYLATED A1C: CPT

## 2020-03-02 PROCEDURE — 80048 BASIC METABOLIC PNL TOTAL CA: CPT

## 2020-03-02 PROCEDURE — 85027 COMPLETE CBC AUTOMATED: CPT

## 2020-03-02 PROCEDURE — 700101 HCHG RX REV CODE 250: Performed by: HOSPITALIST

## 2020-03-02 PROCEDURE — 99239 HOSP IP/OBS DSCHRG MGMT >30: CPT | Performed by: HOSPITALIST

## 2020-03-02 PROCEDURE — 36415 COLL VENOUS BLD VENIPUNCTURE: CPT

## 2020-03-02 RX ORDER — SULFAMETHOXAZOLE AND TRIMETHOPRIM 800; 160 MG/1; MG/1
1 TABLET ORAL 2 TIMES DAILY
Qty: 4 TAB | Refills: 0 | Status: SHIPPED | OUTPATIENT
Start: 2020-03-02 | End: 2020-03-04

## 2020-03-02 RX ORDER — METRONIDAZOLE 500 MG/1
500 TABLET ORAL 3 TIMES DAILY
Qty: 6 TAB | Refills: 0 | Status: SHIPPED | OUTPATIENT
Start: 2020-03-02 | End: 2020-03-04

## 2020-03-02 RX ADMIN — METRONIDAZOLE 500 MG: 500 INJECTION, SOLUTION INTRAVENOUS at 06:00

## 2020-03-02 NOTE — DISCHARGE INSTRUCTIONS
Discharge Instructions per TRIP Brooke    Please follow up with Surgeon  Take all medications as prescribed  Stay well hydrated   Continue on current modified diet at home   DIET: As tolerated   ACTIVITY: As tolerated   DIAGNOSIS: Diverticulitis   Return to ER if increased abdominal pain, unable to have bowel movement, or high fevers     Have provided patient with two surgical groups so she may choose which to follow up with for surgical options to manage her diverticulitis     PATIENT INSTRUCTIONS  DIVERTICULITIS    FOLLOW-UP:  Please make an appointment with Surgeon to discuss surgical options.  Call your primary physician immediately if you have any fevers greater than 102.5,  increasing abdominal pain, or nausea/vomiting.      CAUSE:  Some people may have congenital diverticulosis, but most people develop diverticulosis around or after age 40 due to a low-fiber, high-fat diet, along with inadequate fluid intake. This is very prevalent in the industrialized world where we eat a lot of processed, low fiber foods.  Diverticuli develop due to firm stool and high pressures in the colon, along with secondary spasm, which causes outpouchings to occus where the small arteries penetrate the wall of the colon to feed the internal lining.  These occur most commonly on the left side and lower portions of the colon.  Diverticuli can then cause bleeding from the arteries at these sites of weakness when they rupture or the diveritculi can get blocked with stool and debris and become obstructed, causing diverticulosis, which is due to an infection.  When these are infected, diverticulitis, it is often treated with antibiotics and bowel rest, but when severe, recurrent, or if rupture of the colon occurs, it may require surgery.  Following appropriate dietary changes and taking the proper precautions is therefore very important.    DIET:  You should increase your dietary fiber intake and take a fiber supplement  twice a day.  Make sure that you are taking a supplement that is just fiber and is not a laxative, which should be noted on the package.  Starting a fiber supplement may cause increased gas, more frequent bowel movements, and distension at first but this should improve after a couple of weeks.  Try to eat whole wheat breads and pasta, more fruits and vegetables, along with brown rice and plenty of fluids.  Avoid small undigestible food items that could get stuck in these outpouchings, such as unpopped popcorn kernals, whole corn, small undigestible seeds, etc.. Increase fluid intake!!    ACTIVITY:  Exercise is also a great way to prevent constipation and is encouraged.  Always make sure you take in plenty of fluids when exercising.    MEDICATIONS:  Take an over-the-counter fiber supplement as noted above twice daily.  If your symptoms don't improve with fiber and dietary changes alone your physician may also recommend psyllium or methylcellulose as well.  If your physician has placed you on an antibiotic it is critical that you take the full course of these, even if your symptoms have improved, and that you not miss any doses.    QUESTIONS:  Please feel free to call your physician or the hospital  if you have any questions, and they will be glad to assist you.  If you have further questions it may also be helpful to meet with a dietitician.        Diverticulitis  (Diverticulitis)  La diverticulitis es la inflamación o infección de pequeñas bolsas en el colon que se jan por lennox afección llamada diverticulosis. Las bolsas en el colon se denominan divertículos. El colon, o intestino grueso, es el lugar donde se absorbe agua y se jan las heces.  Entre las complicaciones de la diverticulitis, se incluyen:  · Hemorragias.  · Infección grave.  · Dolor intenso.  · Perforación del colon.  · Obstrucción del colon.  CAUSAS  La diverticulitis está causada por bacterias y  La diverticulitis se produce cuando queda  materia fecal retenida en los divertículos. [place] permite que crezcan bacterias en los divertículos, lo que puede llevar a la inflamación e infección.  FACTORES DE RIESGO  Las personas con diverticulosis corren riesgo de presentar diverticulitis. Las dietas que no incluyen suficiente fibra proveniente de frutas y vegetales pueden predisponer a la diverticulitis.  SÍNTOMAS  Entre los síntomas de diverticulitis, se incluyen:  · Dolor y molestias en el abdomen. El dolor en general aparece en el lado joel del abdomen, waldemar puede aparecer en otras zonas.  · Fiebre o escalofríos  · Hinchazón.  · Cólicos.  · Náuseas.  · Vómitos.  · Estreñimiento.  · Diarrea.  · Eliel en la materia fecal.  DIAGNÓSTICO  El médico le preguntará acerca de amber antecedentes médicos y le hará un examen físico. Es posible que le alecia estudios, porque hay muchas enfermedades que pueden causar los mismos síntomas que la diverticulitis. Los estudios pueden incluir:  · Análisis de eliel.  · Análisis de orina.  · Estudios por imágenes del abdomen, entre ellos, radiografías y tomografías computarizadas.  Cuando la afección esté controlada, el médico puede recomendarle que se katy lennox colonoscopía. La colonoscopía puede mostrar la gravedad de los divertículos y si hay algo más que esté causando los síntomas.  TRATAMIENTO  La mayoría de los casos de diverticulitis son leves y pueden tratarse en el hogar. El tratamiento puede incluir:  · [place] medicamentos para el dolor de venta marcela.  · Seguir lennox dieta líquida absoluta.  · [place] antibióticos por vía oral savana 7 a 10 días.  Los casos más graves pueden tratarse en el hospital. El tratamiento puede incluir:  · No comer ni beber nada.  · [place] medicamentos para el dolor recetados.  · Recibir antibióticos a través de lennox vía IV.  · Recibir líquidos y nutrición a través de lennox vía IV.  · Cirugía.  INSTRUCCIONES PARA EL CUIDADO EN EL HOGAR  · Siga cuidadosamente las indicaciones del médico.  · Siga  las indicaciones del médico acerca de si debe consumir lennox dieta líquida o de otro tipo. Cuando los síntomas mejoren, el médico puede indicarle que modifique la dieta y recomendarle que consuma lennox dieta con alto contenido de fibra. Las frutas y los vegetales son buenas otto de fibra. La fibra facilita la eliminación de heces.  · [place] los suplementos con fibra o los probióticos según las indicaciones del médico.  · [place] los medicamentos solamente nereida se lo haya indicado el médico.  · [place] con todas las visitas de control.  SOLICITE ATENCIÓN MÉDICA SI:  · El dolor no mejora.  · Le resulta difícil alimentarse.  · Los movimientos intestinales no se normalizan.  SOLICITE ATENCIÓN MÉDICA DE INMEDIATO SI:  · El dolor empeora.  · Los síntomas no mejoran.  · Los síntomas empeoran repentinamente.  · Tiene fiebre.  · Ha vomitado repetidas veces.  · La materia fecal es sanguinolenta o tawanda, de aspecto alquitranado.  ASEGÚRESE DE QUE:  · Comprende estas instrucciones.  · Controlará bernal afección.  · Recibirá ayuda de inmediato si no mejora o si empeora.  Esta información no tiene nereida fin reemplazar el consejo del médico. Asegúrese de hacerle al médico cualquier pregunta que tenga.  Document Released: 09/27/2006 Document Revised: 12/23/2014 Document Reviewed: 11/12/2014  [employer] Interactive Patient Education © 2017 Elsevier Inc.    Metronidazole tablets or capsules  ¿Qué es gavin medicamento?  El METRONIDAZOL es un antiinfeccioso. Se utiliza en el tratamiento de ciertos tipos de infecciones bacterianas y por protozoos. No es efectivo para resfríos, gripe u otras infecciones de origen viral.  Gavin medicamento puede ser utilizado para otros usos; si tiene alguna pregunta consulte con bernal proveedor de atención médica o con bernal farmacéutico.  MARCAS COMUNES: Flagyl  ¿Qué le yamileth informar a mi profesional de la maite antes de erica gavin medicamento?  Necesita saber si usted presenta alguno de los siguientes problemas o  situaciones:  -anemia u otros trastornos sanguíneos  -enfermedad del sistema nervioso  -infección micótica o por levadura  -si consume bebidas alcohólicas  -enfermedad hepática  -convulsiones  -lennox reacción alérgica o inusual al metronidazol, a otros medicamentos, alimentos, colorantes o conservantes  -si está embarazada o buscando quedar embarazada  -si está amamantando a un bebé  ¿Cómo yamileth utilizar gavin medicamento?  [place] gavin medicamento por vía oral con un vaso lleno de agua. Siga las instrucciones de la etiqueta del medicamento. [place] amber dosis a intervalos regulares. No tome bernal medicamento con lennox frecuencia mayor a la indicada. Complete todo el tratamiento con el medicamento nereida recetado aun si se siente mejor. No omita ninguna dosis o suspenda el uso de bernal medicamento antes de lo indicado.  Hable con bernal pediatra para informarse acerca del uso de gavin medicamento en niños. Puede requerir atención especial.  Sobredosis: Póngase en contacto inmediatamente con un centro toxicológico o lennox wyatt de urgencia si usted christen que haya tomado demasiado medicamento.  ATENCIÓN: Gavin medicamento es solo para usted. No comparta gavin medicamento con nadie.  ¿Qué sucede si me olvido de lennox dosis?  Si olvida lennox dosis, tómela lo antes posible. Si es jeronimo la hora de la próxima dosis, tome sólo elvi dosis. No tome dosis adicionales o dobles.  ¿Qué puede interactuar con gavin medicamento?  No tome esta medicina con ninguno de los siguientes medicamentos:  -alcohol o cualquier producto que contiene alcohol  -solución oral de amprenavir  -cisapride  -disulfiram  -dofetilida  -dronedarona  -inyección de paclitaxel  -pimozida  -solución oral de ritonavir  -solución oral de sertralina  -inyección de sulfametoxasol-trimetoprima  -tioridazina  -ziprasidona  Esta medicina también puede interactuar con los siguientes medicamentos:  -píldoras anticonceptivas  -cimetidina  -litio  -otros medicamentos que prolongan el intervalo QT (provoca un  ritmo cardiaco anormal)  -fenobarbital  -fenitoína  -warfarina  Puede ser que esta lista no menciona todas las posibles interacciones. Informe a bernal profesional de la maite de todos los productos a base de hierbas, medicamentos de venta marcela o suplementos nutritivos que esté tomando. Si usted fuma, consume bebidas alcohólicas o si utiliza drogas ilegales, indíqueselo también a bernal profesional de la maite. Algunas sustancias pueden interactuar con bernal medicamento.  ¿A qué yamileth estar atento al usar gavin medicamento?  Consulte a bernal médico o a bernal profesional de la maite si amber síntomas no mejoran o si empeoran.  Puede experimentar mareos o somnolencia. No conduzca ni utilice maquinaria ni katy nada que le exija permanecer en estado de alerta hasta que sepa cómo le afecta gavin medicamento. No se siente ni se ponga de pie con rapidez, especialmente si es un paciente de edad avanzada. [place] reduce el riesgo de mareos o desmayos.  Evite las bebidas alcohólicas savana el tratamiento con gavin medicamento y savana los criselda días siguientes. El alcohol puede causarle mareos o hacerlo sentir enfermo o ruborizado.  Si está recibiendo tratamiento para lennox enfermedad de transmisión sexual, no tenga relaciones sexuales hasta que haya completado el tratamiento. Es posible que bernal itzel también necesite tratamiento.  ¿Qué efectos secundarios puedo tener al utilizar gavin medicamento?  Efectos secundarios que debe informar a bernal médico o a bernal profesional de la maite tan pronto nereida sea posible:  -reacciones alérgicas nereida erupción cutánea o urticarias, hinchazón de la nguyen, labios o lengua  -confusión, torpeza  -dificultad para hablar  -mareos  -decoloración o dolor de la boca  -fiebre, infección  -entumecimiento, hormigueo, dolor o debilidad en las alisson o los pies  -dificultad para orinar o cambios en el volumen de orina  -enrojecimiento, formación de ampollas, descamación o distensión de la piel, inclusive dentro de la  boca  -convulsiones  -cansancio o debilidad inusual  -irritación, resequedad o flujo de la vagina  Efectos secundarios que, por lo general, no requieren atención médica (debe informarlos a bernal médico o a bernal profesional de la maite si persisten o si son molestos):  -diarrea  -dolor de gilbert  -irritabilidad  -sabor metálico  -náuseas  -calambres o natalie estomacales  -dificultad para conciliar el sueño  Puede ser que esta lista no menciona todos los posibles efectos secundarios. Comuníquese a bernal médico por asesoramiento médico sobre los efectos secundarios. Usted puede informar los efectos secundarios a la FDA por teléfono al [phone].  ¿Dónde yamileth guardar mi medicina?  Manténgala fuera del alcance de los niños.  Guárdela a temperatura ambiente, menos de 25 grados C (77 grados F). Protéjala liliana. Mantenga el envase gianna cerrado. Deseche todo el medicamento que no haya utilizado, después de la fecha de vencimiento.  ATENCIÓN: [place] folleto es un resumen. Puede ser que no cubra toda la posible información. Si usted tiene preguntas acerca de esta medicina, consulte con bernal médico, bernal farmacéutico o bernal profesional de la maite.  © 2018 Elsevier[employer]Gold Standard (2016-02-09 00:00:00)    Sulfamethoxazole; Trimethoprim, SMX-TMP tablets  ¿Qué es gavin medicamento?  El compuesto SULFAMETOXAZOL; TRIMETOPRIMA o SMX-TMP es lennox combinación de un antibiótico sulfonamida y un zoe antibiótico, trimetoprima. Se utiliza para tratar o prevenir ciertos tipos de infecciones bacterianas. No es efectivo para resfríos, gripe u otras infecciones de origen viral.  Gavin medicamento puede ser utilizado para otros usos; si tiene alguna pregunta consulte con bernal proveedor de atención médica o con bernal farmacéutico.  MARCAS COMUNES: Bacter-Aid DS, Bactrim, Bactrim DS, Septra, Septra DS  ¿Qué le yamileth informar a mi profesional de la maite antes de erica gavin medicamento?  Necesita saber si usted presenta alguno de los siguientes problemas o  situaciones:  -anemia  -asma  -recibe tratamiento con anticonvulsivos  -si consume bebidas alcohólicas con frecuencia  -enfermedad renal  -enfermedad hepática  -bajo nivel de ácido fólico o de [id number] deshidrogenasa  -malnutrición o malabsorción  -ericka  -alergias severas  -trastorno tiroideo  -lennox reacción alérgica o inusual al sulfametoxazol, a la trimetoprima, sulfonamidas, a otros medicamentos, alimentos, colorantes o conservantes  -si está embarazada o buscando quedar embarazada  -si está amamantando a un bebé  ¿Cómo yamileth utilizar gavin medicamento?  [place] gavin medicamento por vía oral con un vaso lleno de agua. Siga las instrucciones de la etiqueta del medicamento. [place] amber dosis a intervalos regulares. No tome bernal medicamento con lennox frecuencia mayor que la indicada. No omita ninguna dosis o suspenda el uso de bernal medicamento antes de lo indicado.  Hable con bernal pediatra para informarse acerca del uso de gavin medicamento en niños. Puede requerir atención especial. Aunque gavin medicamento puede ser recetado a niños tan menores nereida de 2 meses de edad.  Sobredosis: Póngase en contacto inmediatamente con un centro toxicológico o lennox wyatt de urgencia si usted christen que haya tomado demasiado medicamento.  ATENCIÓN: Gavin medicamento es solo para usted. No comparta gavin medicamento con nadie.  ¿Qué sucede si me olvido de lennox dosis?  Si olvida lennox dosis, tómela lo antes posible. Si es jeronimo la hora de la próxima dosis, tome sólo elvi dosis. No tome dosis adicionales o dobles.  ¿Qué puede interactuar con gavin medicamento?  No tome esta medicina con ninguno de los siguientes medicamentos:  -aminobenzoato de potasio  -dofetilida  -metronidazol  Esta medicina también puede interactuar con los siguientes medicamentos:  -inhibidores de la ECA, benazepril, enalapril, lisinopril y ramipril  -píldoras anticonceptivas  -ciclosporina  -digoxina  -diuréticos  -indometacina  -medicamentos para la  diabetes  -metenamina  -metotrexato  -fenitoína  -suplementos de potasio  -pirimetamina  -sulfinpirazona  -antidepresivos tricíclicos  -warfarina  Puede ser que esta lista no menciona todas las posibles interacciones. Informe a bernal profesional de la maite de todos los productos a base de hierbas, medicamentos de venta marcela o suplementos nutritivos que esté tomando. Si usted fuma, consume bebidas alcohólicas o si utiliza drogas ilegales, indíqueselo también a bernal profesional de la maite. Algunas sustancias pueden interactuar con bernal medicamento.  ¿A qué yamileth estar atento al usar dayana medicamento?  Consulte a bernal médico o a bernal profesional de la maite si amber síntomas no mejoran. Alejandra varios vasos de agua por día. [place] le ayudará a reducir el riesgo de padecer problemas renales.  No trate la diarrea con productos de venta marcela. Comuníquese con bernal médico si tiene diarrea que dura más de 2 días o si es severa y acuosa.  Dayana medicamento puede aumentar la sensibilidad al sol. Manténgase fuera liliana solar. Si no lo puede evitar, utilice ropa protectora y crema de protección solar. No utilice lámparas fabricio, octaviano fabricio ni cabinas fabricio.  ¿Qué efectos secundarios puedo tener al utilizar dayana medicamento?  Efectos secundarios que debe informar a bernal médico o a bernal profesional de la maite tan pronto nereida sea posible:  -reacciones alérgicas nereida erupción cutánea o urticarias, hinchazón de la nguyen, labios o lengua  -problemas respiratorios  -fiebre, escalofríos, dolor de garganta  -pulso cardiaco irregular o dolor en el pecho  -natalie articulares o musculares  -dolor o dificultad para orinar  -puntos rojos en la piel  -enrojecimiento, formación de ampollas, descamación o distensión de la piel, inclusive dentro de la boca  -sangrado o magulladuras inusuales  -cansancio o debilidad inusual  -color amarillento de los ojos o la piel  Efectos secundarios que, por lo general, no requieren atención médica (debe informarlos a bernal  médico o a bernal profesional de la maite si persisten o si son molestos):  -diarrea  -mareos  -dolor de gilbert  -pérdida del apetito  -náuseas, vómito  -nerviosismo  Puede ser que esta lista no menciona todos los posibles efectos secundarios. Comuníquese a bernal médico por asesoramiento médico sobre los efectos secundarios. Usted puede informar los efectos secundarios a la FDA por teléfono al 1-800FDA-[phone].  ¿Dónde yamileth guardar mi medicina?  Manténgala fuera del alcance de los niños.  Guárdela a temperatura ambiente, entre 20 y 25 grados C (68 y 77 grados F). Protéjala liliana. Deseche todo el medicamento que no haya utilizado, después de la fecha de vencimiento.  ATENCIÓN: [place] folleto es un resumen. Puede ser que no cubra toda la posible información. Si usted tiene preguntas acerca de esta medicina, consulte con bernal médico, bernal farmacéutico o bernal profesional de la maite.  © 2018 Elsevier/Gold Standard (2016-02-09 00:00:00)    Acetaminophen; Codeine tablets   ¿Qué es gavin medicamento?  El compuesto ACETAMINOFENO; CODEÍNA es un analgésico. Se utiliza para tratar los natalie leves a moderados.  Gavin medicamento puede ser utilizado para otros usos; si tiene alguna pregunta consulte con bernal proveedor de atención médica o con bernal farmacéutico.  MARCAS COMUNES: Cocet, Cocet Plus, Tylenol with Codeine No.3, Tylenol with Codeine No.4, Vopac  ¿Qué le yamileth informar a mi profesional de la maite antes de erica gavin medicamento?  Necesita saber si usted presenta alguno de los siguientes problemas o situaciones:  -tumor cerebral  -enfermedad de [place], enfermedad intestinal inflamatoria o colitis ulcerativa  -abuso de drogas o drogadicción  -lesión de la gilbert  -problemas cardiacos o circulatorios  -si consume alcohol con frecuencia  -enfermedad renal o problemas al orinar  -enfermedad hepática  -enfermedad pulmonar, asma o dificultades al respirar  -lennox reacción alérgica o inusual al acetaminofeno, a la codeína, a los salicilatos, a  otros analgésicos opiáceos, a otros medicamentos, alimentos, colorantes o conservantes  -si está embarazada o buscando quedar embarazada  -si está amamantando a un bebé  ¿Cómo yamileth utilizar gavin medicamento?  [place] gavin medicamento por vía oral con un vaso lleno de agua. Siga las instrucciones de la etiqueta del medicamento. Puede tomarlo con o sin alimentos. Si le produce malestar estomacal, tome el medicamento con alimentos. No tome bernal medicamento con lennox frecuencia mayor a la indicada.  Bernal farmacéutico le dará lennox Guía del medicamento especial (MedGuide, bernal nombre en inglés) con cada receta y en cada ocasión que la vuelva a surtir. Asegúrese de leer esta información cada vez cuidadosamente.  Hable con bernal pediatra para informarse acerca del uso de gavin medicamento en niños. Puede requerir atención especial.  Sobredosis: Póngase en contacto inmediatamente con un centro toxicológico o lennox wyatt de urgencia si usted christen que haya tomado demasiado medicamento.  ATENCIÓN: Gavin medicamento es solo para usted. No comparta gavin medicamento con nadie.  ¿Qué sucede si me olvido de lennox dosis?  Si olvida lennox dosis, tómela lo antes posible. Si es jeronimo la hora de la próxima dosis, tome sólo elvi dosis. No tome dosis adicionales o dobles.  ¿Qué puede interactuar con gavin medicamento?  Esta medicina puede interactuar con los siguientes medicamentos:  alcohol antihistamínicos para alergia, tos y resfrío medicamentos antivirales usados para el VIH o SIDA atropina ciertos antibióticos, tales nereida eritromicina y claritromicina ciertos medicamentos para la ansiedad o para conciliar el sueño ciertos medicamentos para problemas de vejiga, tales nereida oxibutinina, tolterodina ciertos medicamentos para la depresión, nereida amitriptilina, fluoxetina, sertralina ciertos medicamentos para infecciones micóticas, tales nereida itraconazol y quetoconazol ciertos medicamentos para el pulso cardiaco irregular, tales nereida amiodarona, propafenona, quinidina  ciertos medicamentos para el mal de Parkinson, tales nereida benzatropina, trihexifenidilo ciertos medicamentos para convulsiones, tales nereida carbamazepina, fenobarbital, fenitoína, primidona ciertos medicamentos para problemas estomacales, tales nereida diciclomina, hiosciamina ciertos medicamentos para el mareo por movimiento, zheng nereida escopolamina anestésicos generales, tales nereida halotano, isoflurano, metoxiflurano, propofol ipratropio anestésicos locales, tales nereida lidocaína, pramoxina, tetracaína IMAO, tales nereida Carbex, Eldepryl, Marplan, [place] y [place] medicamentos para relajar los músculos antes de lennox cirugía otros medicamentos con acetaminofeno otros medicamentos narcóticos para el dolor o la tos fenotiazinas, tales nereida clorpromazina, mesoridazina, proclorperazina, tioridazina rifampicina  Puede ser que esta lista no menciona todas las posibles interacciones. Informe a bernal profesional de la maite de todos los productos a base de hierbas, medicamentos de venta marcela o suplementos nutritivos que esté tomando. Si usted fuma, consume bebidas alcohólicas o si utiliza drogas ilegales, indíqueselo también a bernal profesional de la maite. Algunas sustancias pueden interactuar con bernal medicamento.  ¿A qué yamileth estar atento al usar gavin medicamento?  Si el dolor no desaparece, si empeora o si experimenta un dolor nuevo o de tipo diferente, consulte a bernal médico o a bernal profesional de la maite. Usted puede desarrollar tolerancia al medicamento. La tolerancia significa que necesitará lennox dosis más cherelle para aliviar el dolor. Tolerancia es normal y esperada cuando esté tomando gavin medicamento por un penny período de tiempo.  No suspenda el uso de bernal medicamento repentinamente debido a que puede desarrollar lennox reacción severa. Bernal cuerpo se acostumbra a gavin medicamento. [place] NO significa que sea adicto. La adicción es un comportamiento que hace referencia a la obtención y utilización de un medicamento con fines que no son  médicos. Si tiene dolor, existe lennox razón médica para que usted tome un analgésico. Bernal médico le indicará la cantidad de medicamento que necesitará erica. Si bernal médico desea que pare el tratamiento, la dosis será reducida gradualmente para evitar efectos secundarios.  Puede experimentar somnolencia o mareos. No conduzca ni utilice maquinaria ni katy nada que le exija permanecer en estado de alerta hasta que sepa cómo le afecta gavin medicamento. No se siente ni se ponga de pie con rapidez, especialmente si es un paciente de edad avanzada. [place] reduce el riesgo de mareos o desmayos. El alcohol puede interferir con el efecto de gavin medicamento. Evite consumir bebidas alcohólicas.  Hay distintos tipos de medicamentos narcóticos (opiáceos) para el dolor. Si usted newton más que un tipo a la misma vez, podrá tener más efectos secundarios. Saran a bernal proveedor de atención medica lennox lista de todos los medicamentos que usted usa. Bernal médico le informará la cantidad de medicamento que necesita erica. No tome más medicamento que lo indicado. Comuníquese con emergencia para ayuda si tiene problemas al respirar.  Gavin medicamento causará estreñimiento. Trate de evacuar los intestinos al menos cada 2 ó 3 días. Si no evacua los intestinos savana 3 días, comuníquese con bernal médico o con bernal profesional de la maite.  No tome Tylenol (acetaminofeno) u otros medicamentos que contienen acetaminofeno con gavin medicamento. El erica acetaminofeno en exceso puede ser muy peligroso. Muchos medicamentos de venta marcela contienen acetaminofeno. Yina siempre las etiquetas cuidadosamente para evitar el erica más acetaminofeno.  Si está amamantando y bernal bebé parece estar más somnoliento que lo normal, esta flácido o tiene dificultad para amamantar o para respirar, comuníquese inmediatamente con bernal médico o busque ayuda de emergencia.  ¿Qué efectos secundarios puedo tener al utilizar gavin medicamento?  Efectos secundarios que debe informar a bernal médico  o a bernal profesional de la maite tan pronto nereida sea posible:  reacciones alérgicas, nereida erupción cutánea, comezón/picazón o urticarias, hinchazón de la nguyen, los labios o la lengua problemas respiratorios confusión enrojecimiento, formación de ampollas, descamación o distensión de la piel, incluso dentro de la boca signos y síntomas de presión sanguínea baja, tales nereida mareos, sensación de desmayos o aturdimiento, caídas, cansancio o debilidad inusual dificultad para orinar o cambios en el volumen de orina color amarillento de los ojos o la piel  Efectos secundarios que generalmente no requieren atención médica (debe informarlos a bernal médico o a bernal profesional de la maite si persisten o si son molestos):  estreñimiento boca seca náuseas, vómito cansancio  Puede ser que esta lista no menciona todos los posibles efectos secundarios. Comuníquese a bernal médico por asesoramiento médico sobre los efectos secundarios. Usted puede informar los efectos secundarios a la FDA por teléfono al [phone].  ¿Dónde yamileth guardar mi medicina?  Manténgala fuera del alcance de los niños. Gavin medicamento puede ser abusado. Mantenga bernal medicamento en un lugar seguro para protegerlo contra robos. No comparta gavin medicamento con phillip. Es peligroso  o ceder gavin medicamento y está prohibido por la jaden.  Guárdelo a temperatura ambiente, entre 15 y 30 grados C (59 y 86 grados F). Protéjalo liliana. Mantenga el envase gianna cerrado.  Deseche todo el medicamento que no haya utilizado, después de la fecha de vencimiento. Deseche los medicamentos sin utilizar y los envases utilizados con cuidado. Los niños y las mascotas pueden ser dañados si encuentran los envases usados o perdidos.  ATENCIÓN: Gavin folleto es un resumen. Puede ser que no cubra toda la posible información. Si usted tiene preguntas acerca de esta medicina, consulte con bernal médico, bernal farmacéutico o bernal profesional de la maite.  © 2018 [employer]vier/Gold Standard (2016-10-19  00:00:00)    Discharge Instructions    Discharged to home by car with relative. Discharged via wheelchair, hospital escort: Yes.  Special equipment needed: Not Applicable    Be sure to schedule a follow-up appointment with your primary care doctor or any specialists as instructed.     Discharge Plan:   Diet Plan: Discussed  Activity Level: Discussed  Confirmed Follow up Appointment: Patient to Call and Schedule Appointment  Confirmed Symptoms Management: Discussed  Medication Reconciliation Updated: Yes  Influenza Vaccine Indication: Patient Refuses    I understand that a diet low in cholesterol, fat, and sodium is recommended for good health. Unless I have been given specific instructions below for another diet, I accept this instruction as my diet prescription.   Other diet: full liquids or as tolerated    Special Instructions: None    · Is patient discharged on Warfarin / Coumadin?   No     Depression / Suicide Risk    As you are discharged from this [facility] facility, it is important to learn how to keep safe from harming yourself.    Recognize the warning signs:  · Abrupt changes in personality, positive or negative- including increase in energy   · Giving away possessions  · Change in eating patterns- significant weight changes-  positive or negative  · Change in sleeping patterns- unable to sleep or sleeping all the time   · Unwillingness or inability to communicate  · Depression  · Unusual sadness, discouragement and loneliness  · Talk of wanting to die  · Neglect of personal appearance   · Rebelliousness- reckless behavior  · Withdrawal from people/activities they love  · Confusion- inability to concentrate     If you or a loved one observes any of these behaviors or has concerns about self-harm, here's what you can do:  · Talk about it- your feelings and reasons for harming yourself  · Remove any means that you might use to hurt yourself (examples: pills, rope, extension cords, firearm)  · Get  professional help from the community (Mental Health, Substance Abuse, psychological counseling)  · Do not be alone:Call your Safe Contact- someone whom you trust who will be there for you.  · Call your local CRISIS HOTLINE 231-9580 or 732-523-4283  · Call your local Children's Mobile Crisis Response Team [place] (193) 599-9561 or www.[employer]glos  · Call the toll free National Suicide Prevention Hotlines   · National Suicide Prevention Lifeline 893-538-XKLX (6685)  · National Hope Line Network 800-SUICIDE (392-8378)

## 2020-03-02 NOTE — CARE PLAN
Problem: Communication  Goal: The ability to communicate needs accurately and effectively will improve  Outcome: PROGRESSING AS EXPECTED    A&Ox4. Pt able to verbalize needs.     Problem: Knowledge Deficit  Goal: Knowledge of disease process/condition, treatment plan, diagnostic tests, and medications will improve  Outcome: PROGRESSING AS EXPECTED     Updated on plan of care and medications. Pt and family verbalized understanding. All questions answered at the bedside.

## 2020-03-02 NOTE — PROGRESS NOTES
Discharge paperwork completed and gone over with patient using iPad .  All questions answered.  Patient understands to make appointment with surgeon (given address and phone number to 2 separate placed) and with her PCP.  Patient understands to take the flagyl and bactrim as prescribed, as well as Tylenol 3 PRN.  Patient states she does not have a prescription for the pain medication.  APRN notified and will write new prescription for patient.  Patient also given work excuse note.  Okay to return to work 3/9/20 with no restrictions.  Patient now awaiting ride home from sister.  No complications with discharge.

## 2020-03-02 NOTE — CARE PLAN
Problem: Pain Management  Goal: Pain level will decrease to patient's comfort goal  Outcome: PROGRESSING AS EXPECTED  Note: Only mild lower abdominal pain.     Problem: Knowledge Deficit  Goal: Knowledge of disease process/condition, treatment plan, diagnostic tests, and medications will improve  Outcome: PROGRESSING AS EXPECTED  Note: Patient understands plan of care for today.

## 2020-03-05 ENCOUNTER — HOSPITAL ENCOUNTER (EMERGENCY)
Facility: MEDICAL CENTER | Age: 65
End: 2020-03-05
Attending: EMERGENCY MEDICINE
Payer: COMMERCIAL

## 2020-03-05 ENCOUNTER — APPOINTMENT (OUTPATIENT)
Dept: RADIOLOGY | Facility: MEDICAL CENTER | Age: 65
End: 2020-03-05
Attending: EMERGENCY MEDICINE
Payer: COMMERCIAL

## 2020-03-05 VITALS
WEIGHT: 160.94 LBS | RESPIRATION RATE: 18 BRPM | HEART RATE: 72 BPM | OXYGEN SATURATION: 98 % | SYSTOLIC BLOOD PRESSURE: 102 MMHG | TEMPERATURE: 98.8 F | DIASTOLIC BLOOD PRESSURE: 69 MMHG | BODY MASS INDEX: 28.52 KG/M2 | HEIGHT: 63 IN

## 2020-03-05 DIAGNOSIS — R11.2 NON-INTRACTABLE VOMITING WITH NAUSEA, UNSPECIFIED VOMITING TYPE: ICD-10-CM

## 2020-03-05 DIAGNOSIS — R10.84 GENERALIZED ABDOMINAL PAIN: ICD-10-CM

## 2020-03-05 LAB
ALBUMIN SERPL BCP-MCNC: 4 G/DL (ref 3.2–4.9)
ALBUMIN/GLOB SERPL: 1.2 G/DL
ALP SERPL-CCNC: 72 U/L (ref 30–99)
ALT SERPL-CCNC: 32 U/L (ref 2–50)
ANION GAP SERPL CALC-SCNC: 11 MMOL/L (ref 0–11.9)
APPEARANCE UR: CLEAR
AST SERPL-CCNC: 29 U/L (ref 12–45)
BACTERIA #/AREA URNS HPF: NEGATIVE /HPF
BASOPHILS # BLD AUTO: 0.4 % (ref 0–1.8)
BASOPHILS # BLD: 0.03 K/UL (ref 0–0.12)
BILIRUB SERPL-MCNC: 0.5 MG/DL (ref 0.1–1.5)
BILIRUB UR QL STRIP.AUTO: NEGATIVE
BUN SERPL-MCNC: 6 MG/DL (ref 8–22)
CALCIUM SERPL-MCNC: 9.4 MG/DL (ref 8.5–10.5)
CHLORIDE SERPL-SCNC: 101 MMOL/L (ref 96–112)
CO2 SERPL-SCNC: 23 MMOL/L (ref 20–33)
COLOR UR: YELLOW
CREAT SERPL-MCNC: 0.85 MG/DL (ref 0.5–1.4)
EOSINOPHIL # BLD AUTO: 0.11 K/UL (ref 0–0.51)
EOSINOPHIL NFR BLD: 1.4 % (ref 0–6.9)
EPI CELLS #/AREA URNS HPF: NEGATIVE /HPF
ERYTHROCYTE [DISTWIDTH] IN BLOOD BY AUTOMATED COUNT: 38.5 FL (ref 35.9–50)
GLOBULIN SER CALC-MCNC: 3.4 G/DL (ref 1.9–3.5)
GLUCOSE SERPL-MCNC: 157 MG/DL (ref 65–99)
GLUCOSE UR STRIP.AUTO-MCNC: NEGATIVE MG/DL
HCT VFR BLD AUTO: 39.3 % (ref 37–47)
HGB BLD-MCNC: 13.6 G/DL (ref 12–16)
HYALINE CASTS #/AREA URNS LPF: NORMAL /LPF
IMM GRANULOCYTES # BLD AUTO: 0.02 K/UL (ref 0–0.11)
IMM GRANULOCYTES NFR BLD AUTO: 0.3 % (ref 0–0.9)
KETONES UR STRIP.AUTO-MCNC: NEGATIVE MG/DL
LEUKOCYTE ESTERASE UR QL STRIP.AUTO: ABNORMAL
LIPASE SERPL-CCNC: 12 U/L (ref 11–82)
LYMPHOCYTES # BLD AUTO: 3.06 K/UL (ref 1–4.8)
LYMPHOCYTES NFR BLD: 38.7 % (ref 22–41)
MCH RBC QN AUTO: 30.2 PG (ref 27–33)
MCHC RBC AUTO-ENTMCNC: 34.6 G/DL (ref 33.6–35)
MCV RBC AUTO: 87.1 FL (ref 81.4–97.8)
MICRO URNS: ABNORMAL
MONOCYTES # BLD AUTO: 0.41 K/UL (ref 0–0.85)
MONOCYTES NFR BLD AUTO: 5.2 % (ref 0–13.4)
NEUTROPHILS # BLD AUTO: 4.27 K/UL (ref 2–7.15)
NEUTROPHILS NFR BLD: 54 % (ref 44–72)
NITRITE UR QL STRIP.AUTO: NEGATIVE
NRBC # BLD AUTO: 0 K/UL
NRBC BLD-RTO: 0 /100 WBC
PH UR STRIP.AUTO: 8.5 [PH] (ref 5–8)
PLATELET # BLD AUTO: 288 K/UL (ref 164–446)
PMV BLD AUTO: 8.5 FL (ref 9–12.9)
POTASSIUM SERPL-SCNC: 3.8 MMOL/L (ref 3.6–5.5)
PROT SERPL-MCNC: 7.4 G/DL (ref 6–8.2)
PROT UR QL STRIP: NEGATIVE MG/DL
RBC # BLD AUTO: 4.51 M/UL (ref 4.2–5.4)
RBC # URNS HPF: NORMAL /HPF
RBC UR QL AUTO: NEGATIVE
SODIUM SERPL-SCNC: 135 MMOL/L (ref 135–145)
SP GR UR STRIP.AUTO: 1
UROBILINOGEN UR STRIP.AUTO-MCNC: 0.2 MG/DL
WBC # BLD AUTO: 7.9 K/UL (ref 4.8–10.8)
WBC #/AREA URNS HPF: NORMAL /HPF

## 2020-03-05 PROCEDURE — 81001 URINALYSIS AUTO W/SCOPE: CPT

## 2020-03-05 PROCEDURE — A9270 NON-COVERED ITEM OR SERVICE: HCPCS | Performed by: EMERGENCY MEDICINE

## 2020-03-05 PROCEDURE — 36415 COLL VENOUS BLD VENIPUNCTURE: CPT

## 2020-03-05 PROCEDURE — 74177 CT ABD & PELVIS W/CONTRAST: CPT

## 2020-03-05 PROCEDURE — 99285 EMERGENCY DEPT VISIT HI MDM: CPT

## 2020-03-05 PROCEDURE — 96374 THER/PROPH/DIAG INJ IV PUSH: CPT

## 2020-03-05 PROCEDURE — 85025 COMPLETE CBC W/AUTO DIFF WBC: CPT

## 2020-03-05 PROCEDURE — 96375 TX/PRO/DX INJ NEW DRUG ADDON: CPT

## 2020-03-05 PROCEDURE — 700117 HCHG RX CONTRAST REV CODE 255: Performed by: EMERGENCY MEDICINE

## 2020-03-05 PROCEDURE — 83690 ASSAY OF LIPASE: CPT

## 2020-03-05 PROCEDURE — 80053 COMPREHEN METABOLIC PANEL: CPT

## 2020-03-05 PROCEDURE — 700111 HCHG RX REV CODE 636 W/ 250 OVERRIDE (IP): Performed by: EMERGENCY MEDICINE

## 2020-03-05 PROCEDURE — 700102 HCHG RX REV CODE 250 W/ 637 OVERRIDE(OP): Performed by: EMERGENCY MEDICINE

## 2020-03-05 RX ORDER — HYDROCODONE BITARTRATE AND ACETAMINOPHEN 5; 325 MG/1; MG/1
1 TABLET ORAL EVERY 6 HOURS PRN
Qty: 7 TAB | Refills: 0 | Status: SHIPPED | OUTPATIENT
Start: 2020-03-05 | End: 2020-03-08

## 2020-03-05 RX ORDER — FAMOTIDINE 20 MG/1
20 TABLET, FILM COATED ORAL 2 TIMES DAILY
Qty: 60 TAB | Refills: 0 | Status: ON HOLD | OUTPATIENT
Start: 2020-03-05 | End: 2020-05-18

## 2020-03-05 RX ORDER — ONDANSETRON 2 MG/ML
4 INJECTION INTRAMUSCULAR; INTRAVENOUS ONCE
Status: COMPLETED | OUTPATIENT
Start: 2020-03-05 | End: 2020-03-05

## 2020-03-05 RX ORDER — MORPHINE SULFATE 4 MG/ML
4 INJECTION, SOLUTION INTRAMUSCULAR; INTRAVENOUS ONCE
Status: COMPLETED | OUTPATIENT
Start: 2020-03-05 | End: 2020-03-05

## 2020-03-05 RX ORDER — ONDANSETRON 4 MG/1
4 TABLET, ORALLY DISINTEGRATING ORAL EVERY 6 HOURS PRN
Qty: 8 TAB | Refills: 0 | Status: ON HOLD | OUTPATIENT
Start: 2020-03-05 | End: 2020-05-18

## 2020-03-05 RX ADMIN — LIDOCAINE HYDROCHLORIDE 30 ML: 20 SOLUTION OROPHARYNGEAL at 22:27

## 2020-03-05 RX ADMIN — MORPHINE SULFATE 4 MG: 4 INJECTION INTRAVENOUS at 21:35

## 2020-03-05 RX ADMIN — ONDANSETRON 4 MG: 2 INJECTION INTRAMUSCULAR; INTRAVENOUS at 21:35

## 2020-03-05 RX ADMIN — IOHEXOL 80 ML: 350 INJECTION, SOLUTION INTRAVENOUS at 21:46

## 2020-03-05 ASSESSMENT — FIBROSIS 4 INDEX: FIB4 SCORE: 1.32

## 2020-03-06 LAB
ARSENIC ORGANIC UR-SCNC: <5 UG/L
ARSENIC.METHYLATED UR-MCNC: <10 UG/L
INORG ARSENIC UR-MCNC: <10 UG/L

## 2020-03-06 NOTE — ED PROVIDER NOTES
"ED Provider Note    CHIEF COMPLAINT  Chief Complaint   Patient presents with   • Abdominal Pain     since 0200   • N/V     since 0200       HPI  Leti Arana is a 64 y.o. female who presents to the emergency department chief complaint of abdominal pain and nausea vomiting throughout the day today.  The patient has a history of acute on chronic diverticulitis in the sigmoid area she states she had multiple bouts over the last year was actually hospitalized for this last week.  After being sent home she is been compliant with her medications and states her last 24 hours she is had worsening generalized abdominal pain in the epigastrium throughout the whole abdomen feeling distended and as well as pain in the left lower quadrant.  She is passing gas and having bowel movements although they are small she had her last episode emesis was a few hours prior to arrival she has not taken anything for pain nor nausea she is not had any fevers or chills    REVIEW OF SYSTEMS  Positives as above. Pertinent negatives include dysuria fevers or chills chest pain shortness of breath bloody emesis bloody stools constipation diarrhea  All other review of systems are negative    PAST MEDICAL HISTORY   has a past medical history of Diabetes mellitus type II (10/17/2012) and Diverticulitis.    SOCIAL HISTORY  Social History     Tobacco Use   • Smoking status: Never Smoker   • Smokeless tobacco: Never Used   Substance and Sexual Activity   • Alcohol use: No   • Drug use: No   • Sexual activity: Not on file       SURGICAL HISTORY   has a past surgical history that includes abdominal hysterectomy total and cholecystectomy.    CURRENT MEDICATIONS  Home Medications    **Home medications have not yet been reviewed for this encounter**         ALLERGIES  No Known Allergies    PHYSICAL EXAM  VITAL SIGNS: /77   Pulse 87   Temp 37.1 °C (98.8 °F) (Temporal)   Resp 18   Ht 1.6 m (5' 3\")   Wt 73 kg (160 lb 15 oz)   SpO2 99%   BMI " 28.51 kg/m²    Pulse ox interpretation: I interpret this pulse ox as normal.  Constitutional: Alert in no apparent distress.  HENT: Normocephalic atraumatic, MMM  Eyes: PER, Conjunctiva normal, Non-icteric.   Neck: Normal range of motion, No tenderness, Supple, No stridor.   Cardiovascular: Regular rate and rhythm, no murmurs.   Thorax & Lungs: Normal breath sounds, No respiratory distress, No wheezing, No chest tenderness.   Abdomen: Bowel sounds normal, Soft, generalized tenderness, No pulsatile masses. No peritoneal signs.  Skin: Warm, Dry, No erythema, No rash.   Back: No bony tenderness, No CVA tenderness.   Extremities/MSK: Intact distal pulses, No edema, No tenderness, No cyanosis, no major deformities noted  Neurologic: Alert and oriented x3, No focal deficits noted.       DIFFERENTIAL DIAGNOSIS AND WORK UP PLAN    This is a 64 y.o. female who presents with nausea vomiting general abdominal pain with recent diagnosis diverticulitis, she could be failing outpatient oral antibiotics will evaluate for colitis abscess bowel perforation could be severe gastritis or peptic ulcer disease and chronic pancreatitis bowel obstruction or ileus.  She appears uncomfortable on exam does not appear dehydrated we will treat her with pain management and a CT scan of the abdomen    DIAGNOSTIC STUDIES / PROCEDURES    EKG  No results found for this or any previous visit.    LABS  Pertinent Lab Findings  CBC within normal limits, CMP within normal limits urinalysis without signs of infection or dehydration normal lipase      RADIOLOGY  CT-ABDOMEN-PELVIS WITH   Final Result      1.  Intrahepatic and extra hepatic biliary dilation, likely postoperative and slightly improved from prior exam.   2.  Resolving distal descending colon diverticulitis.   3.  No intra-abdominal abscess or evidence for perforation.   4.  No CT evidence for appendicitis or bowel obstruction.        The radiologist's interpretation of all radiological studies  "have been reviewed by me.      COURSE & MEDICAL DECISION MAKING  Pertinent Labs & Imaging studies reviewed. (See chart for details)    10:13 PM  I reassessed patient at the bedside, we discussed that her CT scan is showing improved finding her laboratory analysis within normal limits.  She needs to finish antibiotics but this could be a bad gastritis or peptic ulcer disease could be a bad response to her Tylenol with codeine, she will be given a GI cocktail and sent home with some Pepcid and Zofran we discussed the liquid diet for the next 48 hours she has follow-up with surgical services on Monday.  She will return to the ED for any new or worsening issues in the meantime    In prescribing controlled substances to this patient, I certify that I have obtained and reviewed the medical history of Leti Arana. I have also made a good jillian effort to obtain applicable records from other providers who have treated the patient and records did not demonstrate any increased risk of substance abuse that would prevent me from prescribing controlled substances.     I have conducted a physical exam and documented it. I have reviewed Ms. Arana’s prescription history as maintained by the [place] Prescription Monitoring Program.     I have assessed the patient’s risk for abuse, dependency, and addiction using the validated Opioid Risk Tool available at https://www.mdcalc.com/kdrafk-recj-doiq-ort-narcotic-abuse. 1  Given the above, I believe the benefits of controlled substance therapy outweigh the risks. The reasons for prescribing controlled substances include non-narcotic, oral analgesic alternatives have been inadequate for pain control. Accordingly, I have discussed the risk and benefits, treatment plan, and alternative therapies with the patient.     /69   Pulse 72   Temp 37.1 °C (98.8 °F) (Temporal)   Resp 18   Ht 1.6 m (5' 3\")   Wt 73 kg (160 lb 15 oz)   SpO2 98%   BMI 28.51 kg/m²       The patient will " return for new or worsening symptoms and is stable at the time of discharge.    The patient is referred to a primary physician for blood pressure management, diabetic screening, and for all other preventative health concerns.    DISPOSITION:  Patient will be discharged home in stable condition.    FOLLOW UP:  Armond Quevedo M.D.  [address] [facility] [id number] [phone]    Schedule an appointment as soon as possible for a visit       [facility] Emergency Dept  [address] [facility] [address] [phone]    If symptoms worsen      OUTPATIENT MEDICATIONS:  Discharge Medication List as of 3/5/2020 10:54 PM      START taking these medications    Details   famotidine (PEPCID) 20 MG Tab Take 1 Tab by mouth 2 times a day., Disp-60 Tab, R-0, Normal      HYDROcodone-acetaminophen (NORCO) 5-325 MG Tab per tablet Take 1 Tab by mouth every 6 hours as needed for up to 3 days., Disp-7 Tab, R-0, Print Rx Paper      ondansetron (ZOFRAN ODT) 4 MG TABLET DISPERSIBLE Take 1 Tab by mouth every 6 hours as needed., Disp-8 Tab, R-0, Normal               FINAL IMPRESSION  1. Generalized abdominal pain  HYDROcodone-acetaminophen (NORCO) 5-325 MG Tab per tablet   2. Non-intractable vomiting with nausea, unspecified vomiting type             Electronically signed by: Chen Castellon M.D., 3/5/2020 9:19 PM    This dictation has been created using voice recognition software and/or scribes. The accuracy of the dictation is limited by the abilities of the software and the expertise of the scribes. I expect there may be some errors of grammar and possibly content. I made every attempt to manually correct the errors within my dictation. However, errors related to voice recognition software and/or scribes may still exist and should be interpreted within the appropriate context.

## 2020-03-06 NOTE — ED TRIAGE NOTES
Chief Complaint   Patient presents with   • Abdominal Pain     since 0200   • N/V     since 0200     Pt to triage via wheelchair. Pt reports abdominal pain with nausea and vomiting that began at 0200. Pt reports that she has not been able to keep anything down all day. Pt recently admitted Friday-Monday for diverticulitis and is currently taking an antibiotic for that. Pt appears uncomfortable in triage.

## 2020-03-06 NOTE — ED TRIAGE NOTES
Pt and family is upset about the wait time. Family members are asking for pain medication for the patient. Apologized for the wait and educated patient and family about the triage process. Asked patient and family to notify staff of any medical changes.

## 2020-05-12 DIAGNOSIS — Z01.812 PRE-OPERATIVE LABORATORY EXAMINATION: ICD-10-CM

## 2020-05-12 DIAGNOSIS — Z01.810 PRE-OPERATIVE CARDIOVASCULAR EXAMINATION: ICD-10-CM

## 2020-05-12 LAB
ANION GAP SERPL CALC-SCNC: 13 MMOL/L (ref 7–16)
BUN SERPL-MCNC: 8 MG/DL (ref 8–22)
CALCIUM SERPL-MCNC: 9.4 MG/DL (ref 8.5–10.5)
CHLORIDE SERPL-SCNC: 97 MMOL/L (ref 96–112)
CO2 SERPL-SCNC: 26 MMOL/L (ref 20–33)
COVID ORDER STATUS COVID19: NORMAL
CREAT SERPL-MCNC: 0.58 MG/DL (ref 0.5–1.4)
EKG IMPRESSION: NORMAL
ERYTHROCYTE [DISTWIDTH] IN BLOOD BY AUTOMATED COUNT: 40.8 FL (ref 35.9–50)
GLUCOSE SERPL-MCNC: 109 MG/DL (ref 65–99)
HCT VFR BLD AUTO: 41 % (ref 37–47)
HGB BLD-MCNC: 13.7 G/DL (ref 12–16)
MCH RBC QN AUTO: 30.3 PG (ref 27–33)
MCHC RBC AUTO-ENTMCNC: 33.4 G/DL (ref 33.6–35)
MCV RBC AUTO: 90.7 FL (ref 81.4–97.8)
PLATELET # BLD AUTO: 238 K/UL (ref 164–446)
PMV BLD AUTO: 8.9 FL (ref 9–12.9)
POTASSIUM SERPL-SCNC: 4 MMOL/L (ref 3.6–5.5)
RBC # BLD AUTO: 4.52 M/UL (ref 4.2–5.4)
SODIUM SERPL-SCNC: 136 MMOL/L (ref 135–145)
WBC # BLD AUTO: 9.1 K/UL (ref 4.8–10.8)

## 2020-05-12 PROCEDURE — 36415 COLL VENOUS BLD VENIPUNCTURE: CPT

## 2020-05-12 PROCEDURE — 80048 BASIC METABOLIC PNL TOTAL CA: CPT

## 2020-05-12 PROCEDURE — 93010 ELECTROCARDIOGRAM REPORT: CPT | Performed by: INTERNAL MEDICINE

## 2020-05-12 PROCEDURE — 93005 ELECTROCARDIOGRAM TRACING: CPT

## 2020-05-12 PROCEDURE — C9803 HOPD COVID-19 SPEC COLLECT: HCPCS

## 2020-05-12 PROCEDURE — 85027 COMPLETE CBC AUTOMATED: CPT

## 2020-05-12 ASSESSMENT — FIBROSIS 4 INDEX: FIB4 SCORE: 1.14

## 2020-05-14 LAB
SARS-COV-2 RNA RESP QL NAA+PROBE: NOT DETECTED
SPECIMEN SOURCE: NORMAL

## 2020-05-17 NOTE — OR NURSING
COVID-19 Pre-surgery screenin. Do you have an undiagnosed respiratory illness or symptoms such as coughing or sneezing?   a. Onset of Sx   b. Acute vs. chronic respiratory illness    NO     2. Do you have an unexplained fever greater than 100.4 degrees Fahrenheit or 38 degrees Celsius?                    NO     3. Have you had direct exposure to a patient who tested positive for Covid-19?                           NO     4. Have you traveled outside of [place] within the last 14 days?                     NO     Informed of no visitor policy-YES

## 2020-05-18 ENCOUNTER — HOSPITAL ENCOUNTER (INPATIENT)
Facility: MEDICAL CENTER | Age: 65
LOS: 3 days | DRG: 331 | End: 2020-05-21
Attending: SURGERY | Admitting: SURGERY
Payer: COMMERCIAL

## 2020-05-18 ENCOUNTER — ANESTHESIA EVENT (OUTPATIENT)
Dept: SURGERY | Facility: MEDICAL CENTER | Age: 65
DRG: 331 | End: 2020-05-18
Payer: COMMERCIAL

## 2020-05-18 ENCOUNTER — ANESTHESIA (OUTPATIENT)
Dept: SURGERY | Facility: MEDICAL CENTER | Age: 65
DRG: 331 | End: 2020-05-18
Payer: COMMERCIAL

## 2020-05-18 DIAGNOSIS — K57.92 DIVERTICULITIS: ICD-10-CM

## 2020-05-18 LAB
GLUCOSE BLD-MCNC: 97 MG/DL (ref 65–99)
PATHOLOGY CONSULT NOTE: NORMAL

## 2020-05-18 PROCEDURE — 0DTN0ZZ RESECTION OF SIGMOID COLON, OPEN APPROACH: ICD-10-PCS | Performed by: SURGERY

## 2020-05-18 PROCEDURE — 501445 HCHG STAPLER, SKIN DISP: Performed by: SURGERY

## 2020-05-18 PROCEDURE — A9270 NON-COVERED ITEM OR SERVICE: HCPCS

## 2020-05-18 PROCEDURE — 700101 HCHG RX REV CODE 250: Performed by: ANESTHESIOLOGY

## 2020-05-18 PROCEDURE — 700105 HCHG RX REV CODE 258: Performed by: SURGERY

## 2020-05-18 PROCEDURE — 700111 HCHG RX REV CODE 636 W/ 250 OVERRIDE (IP)

## 2020-05-18 PROCEDURE — 700111 HCHG RX REV CODE 636 W/ 250 OVERRIDE (IP): Performed by: SURGERY

## 2020-05-18 PROCEDURE — 700111 HCHG RX REV CODE 636 W/ 250 OVERRIDE (IP): Performed by: ANESTHESIOLOGY

## 2020-05-18 PROCEDURE — 501459: Performed by: SURGERY

## 2020-05-18 PROCEDURE — 700102 HCHG RX REV CODE 250 W/ 637 OVERRIDE(OP): Performed by: ANESTHESIOLOGY

## 2020-05-18 PROCEDURE — 160009 HCHG ANES TIME/MIN: Performed by: SURGERY

## 2020-05-18 PROCEDURE — 160041 HCHG SURGERY MINUTES - EA ADDL 1 MIN LEVEL 4: Performed by: SURGERY

## 2020-05-18 PROCEDURE — 88307 TISSUE EXAM BY PATHOLOGIST: CPT

## 2020-05-18 PROCEDURE — 160048 HCHG OR STATISTICAL LEVEL 1-5: Performed by: SURGERY

## 2020-05-18 PROCEDURE — A9270 NON-COVERED ITEM OR SERVICE: HCPCS | Performed by: ANESTHESIOLOGY

## 2020-05-18 PROCEDURE — 82962 GLUCOSE BLOOD TEST: CPT

## 2020-05-18 PROCEDURE — 160029 HCHG SURGERY MINUTES - 1ST 30 MINS LEVEL 4: Performed by: SURGERY

## 2020-05-18 PROCEDURE — 160002 HCHG RECOVERY MINUTES (STAT): Performed by: SURGERY

## 2020-05-18 PROCEDURE — 502704 HCHG DEVICE, LIGASURE IMPACT: Performed by: SURGERY

## 2020-05-18 PROCEDURE — A9270 NON-COVERED ITEM OR SERVICE: HCPCS | Performed by: SURGERY

## 2020-05-18 PROCEDURE — 64486 TAP BLOCK UNIL BY INJECTION: CPT | Performed by: SURGERY

## 2020-05-18 PROCEDURE — 160035 HCHG PACU - 1ST 60 MINS PHASE I: Performed by: SURGERY

## 2020-05-18 PROCEDURE — 160036 HCHG PACU - EA ADDL 30 MINS PHASE I: Performed by: SURGERY

## 2020-05-18 PROCEDURE — 501838 HCHG SUTURE GENERAL: Performed by: SURGERY

## 2020-05-18 PROCEDURE — 501428 HCHG STAPLER, CURVED: Performed by: SURGERY

## 2020-05-18 PROCEDURE — 700102 HCHG RX REV CODE 250 W/ 637 OVERRIDE(OP): Performed by: SURGERY

## 2020-05-18 PROCEDURE — 94760 N-INVAS EAR/PLS OXIMETRY 1: CPT

## 2020-05-18 PROCEDURE — 700102 HCHG RX REV CODE 250 W/ 637 OVERRIDE(OP)

## 2020-05-18 PROCEDURE — 770006 HCHG ROOM/CARE - MED/SURG/GYN SEMI*

## 2020-05-18 RX ORDER — DIPHENHYDRAMINE HYDROCHLORIDE 50 MG/ML
12.5 INJECTION INTRAMUSCULAR; INTRAVENOUS
Status: DISCONTINUED | OUTPATIENT
Start: 2020-05-18 | End: 2020-05-18 | Stop reason: HOSPADM

## 2020-05-18 RX ORDER — ROPIVACAINE HYDROCHLORIDE 5 MG/ML
INJECTION, SOLUTION EPIDURAL; INFILTRATION; PERINEURAL PRN
Status: DISCONTINUED | OUTPATIENT
Start: 2020-05-18 | End: 2020-05-18 | Stop reason: SURG

## 2020-05-18 RX ORDER — ACETAMINOPHEN 500 MG
1000 TABLET ORAL ONCE
Status: COMPLETED | OUTPATIENT
Start: 2020-05-18 | End: 2020-05-18

## 2020-05-18 RX ORDER — SODIUM CHLORIDE, SODIUM LACTATE, POTASSIUM CHLORIDE, CALCIUM CHLORIDE 600; 310; 30; 20 MG/100ML; MG/100ML; MG/100ML; MG/100ML
INJECTION, SOLUTION INTRAVENOUS CONTINUOUS
Status: DISPENSED | OUTPATIENT
Start: 2020-05-18 | End: 2020-05-18

## 2020-05-18 RX ORDER — METOCLOPRAMIDE HYDROCHLORIDE 5 MG/ML
INJECTION INTRAMUSCULAR; INTRAVENOUS PRN
Status: DISCONTINUED | OUTPATIENT
Start: 2020-05-18 | End: 2020-05-18 | Stop reason: SURG

## 2020-05-18 RX ORDER — GABAPENTIN 100 MG/1
100 CAPSULE ORAL ONCE
Status: COMPLETED | OUTPATIENT
Start: 2020-05-18 | End: 2020-05-18

## 2020-05-18 RX ORDER — NEOMYCIN SULFATE 500 MG/1
1000 TABLET ORAL
Status: ON HOLD | COMMUNITY
End: 2020-05-21

## 2020-05-18 RX ORDER — OXYCODONE HYDROCHLORIDE 5 MG/1
5 TABLET ORAL
Status: DISCONTINUED | OUTPATIENT
Start: 2020-05-18 | End: 2020-05-21 | Stop reason: HOSPADM

## 2020-05-18 RX ORDER — ENEMA 19; 7 G/133ML; G/133ML
1 ENEMA RECTAL
Status: DISCONTINUED | OUTPATIENT
Start: 2020-05-18 | End: 2020-05-21 | Stop reason: HOSPADM

## 2020-05-18 RX ORDER — ONDANSETRON 4 MG/1
4 TABLET, ORALLY DISINTEGRATING ORAL EVERY 6 HOURS PRN
Status: ON HOLD | COMMUNITY
End: 2020-05-18

## 2020-05-18 RX ORDER — ACETAMINOPHEN 325 MG/1
650 TABLET ORAL EVERY 6 HOURS
Status: DISCONTINUED | OUTPATIENT
Start: 2020-05-18 | End: 2020-05-21 | Stop reason: HOSPADM

## 2020-05-18 RX ORDER — ACETAMINOPHEN 500 MG
500-1000 TABLET ORAL EVERY 6 HOURS PRN
Status: ON HOLD | COMMUNITY
End: 2020-05-21

## 2020-05-18 RX ORDER — KETOROLAC TROMETHAMINE 30 MG/ML
15 INJECTION, SOLUTION INTRAMUSCULAR; INTRAVENOUS EVERY 6 HOURS
Status: DISCONTINUED | OUTPATIENT
Start: 2020-05-18 | End: 2020-05-21 | Stop reason: HOSPADM

## 2020-05-18 RX ORDER — CEFUROXIME AXETIL 500 MG/1
500 TABLET ORAL 2 TIMES DAILY
Status: ON HOLD | COMMUNITY
Start: 2020-05-01 | End: 2020-05-18

## 2020-05-18 RX ORDER — SODIUM CHLORIDE, SODIUM LACTATE, POTASSIUM CHLORIDE, CALCIUM CHLORIDE 600; 310; 30; 20 MG/100ML; MG/100ML; MG/100ML; MG/100ML
INJECTION, SOLUTION INTRAVENOUS EVERY 6 HOURS
Status: COMPLETED | OUTPATIENT
Start: 2020-05-18 | End: 2020-05-19

## 2020-05-18 RX ORDER — CEFOTETAN DISODIUM 2 G/20ML
INJECTION, POWDER, FOR SOLUTION INTRAMUSCULAR; INTRAVENOUS PRN
Status: DISCONTINUED | OUTPATIENT
Start: 2020-05-18 | End: 2020-05-18 | Stop reason: SURG

## 2020-05-18 RX ORDER — OXYCODONE HCL 5 MG/5 ML
10 SOLUTION, ORAL ORAL
Status: COMPLETED | OUTPATIENT
Start: 2020-05-18 | End: 2020-05-18

## 2020-05-18 RX ORDER — MORPHINE SULFATE 10 MG/ML
4 INJECTION, SOLUTION INTRAMUSCULAR; INTRAVENOUS
Status: DISCONTINUED | OUTPATIENT
Start: 2020-05-18 | End: 2020-05-19

## 2020-05-18 RX ORDER — ONDANSETRON 2 MG/ML
INJECTION INTRAMUSCULAR; INTRAVENOUS PRN
Status: DISCONTINUED | OUTPATIENT
Start: 2020-05-18 | End: 2020-05-18 | Stop reason: SURG

## 2020-05-18 RX ORDER — MIDAZOLAM HYDROCHLORIDE 1 MG/ML
INJECTION INTRAMUSCULAR; INTRAVENOUS PRN
Status: DISCONTINUED | OUTPATIENT
Start: 2020-05-18 | End: 2020-05-18 | Stop reason: SURG

## 2020-05-18 RX ORDER — LIDOCAINE HYDROCHLORIDE 10 MG/ML
INJECTION, SOLUTION EPIDURAL; INFILTRATION; INTRACAUDAL; PERINEURAL
Status: COMPLETED
Start: 2020-05-18 | End: 2020-05-18

## 2020-05-18 RX ORDER — OXYCODONE HCL 5 MG/5 ML
5 SOLUTION, ORAL ORAL
Status: COMPLETED | OUTPATIENT
Start: 2020-05-18 | End: 2020-05-18

## 2020-05-18 RX ORDER — TRAMADOL HYDROCHLORIDE 50 MG/1
50 TABLET ORAL EVERY 6 HOURS PRN
Status: ON HOLD | COMMUNITY
Start: 2020-05-05 | End: 2020-05-18

## 2020-05-18 RX ORDER — ONDANSETRON 2 MG/ML
4 INJECTION INTRAMUSCULAR; INTRAVENOUS
Status: COMPLETED | OUTPATIENT
Start: 2020-05-18 | End: 2020-05-18

## 2020-05-18 RX ORDER — MEPERIDINE HYDROCHLORIDE 25 MG/ML
12.5 INJECTION INTRAMUSCULAR; INTRAVENOUS; SUBCUTANEOUS
Status: DISCONTINUED | OUTPATIENT
Start: 2020-05-18 | End: 2020-05-18 | Stop reason: HOSPADM

## 2020-05-18 RX ORDER — ONDANSETRON 2 MG/ML
4 INJECTION INTRAMUSCULAR; INTRAVENOUS EVERY 4 HOURS PRN
Status: DISCONTINUED | OUTPATIENT
Start: 2020-05-18 | End: 2020-05-21 | Stop reason: HOSPADM

## 2020-05-18 RX ORDER — LIDOCAINE HYDROCHLORIDE 20 MG/ML
INJECTION, SOLUTION EPIDURAL; INFILTRATION; INTRACAUDAL; PERINEURAL PRN
Status: DISCONTINUED | OUTPATIENT
Start: 2020-05-18 | End: 2020-05-18 | Stop reason: SURG

## 2020-05-18 RX ORDER — HYDRALAZINE HYDROCHLORIDE 20 MG/ML
5 INJECTION INTRAMUSCULAR; INTRAVENOUS
Status: DISCONTINUED | OUTPATIENT
Start: 2020-05-18 | End: 2020-05-18 | Stop reason: HOSPADM

## 2020-05-18 RX ORDER — DEXAMETHASONE SODIUM PHOSPHATE 4 MG/ML
INJECTION, SOLUTION INTRA-ARTICULAR; INTRALESIONAL; INTRAMUSCULAR; INTRAVENOUS; SOFT TISSUE PRN
Status: DISCONTINUED | OUTPATIENT
Start: 2020-05-18 | End: 2020-05-18 | Stop reason: SURG

## 2020-05-18 RX ORDER — METRONIDAZOLE 500 MG/1
500 TABLET ORAL
Status: ON HOLD | COMMUNITY
End: 2020-05-21

## 2020-05-18 RX ORDER — OXYCODONE HYDROCHLORIDE 10 MG/1
10 TABLET ORAL
Status: DISCONTINUED | OUTPATIENT
Start: 2020-05-18 | End: 2020-05-21 | Stop reason: HOSPADM

## 2020-05-18 RX ORDER — METRONIDAZOLE 500 MG/1
500 TABLET ORAL 3 TIMES DAILY
Status: ON HOLD | COMMUNITY
Start: 2020-05-01 | End: 2020-05-18

## 2020-05-18 RX ORDER — ROCURONIUM BROMIDE 10 MG/ML
INJECTION, SOLUTION INTRAVENOUS PRN
Status: DISCONTINUED | OUTPATIENT
Start: 2020-05-18 | End: 2020-05-18 | Stop reason: SURG

## 2020-05-18 RX ADMIN — FENTANYL CITRATE 50 MCG: 50 INJECTION INTRAMUSCULAR; INTRAVENOUS at 11:50

## 2020-05-18 RX ADMIN — METOCLOPRAMIDE 10 MG: 5 INJECTION, SOLUTION INTRAMUSCULAR; INTRAVENOUS at 12:03

## 2020-05-18 RX ADMIN — FENTANYL CITRATE 25 MCG: 0.05 INJECTION, SOLUTION INTRAMUSCULAR; INTRAVENOUS at 13:53

## 2020-05-18 RX ADMIN — POVIDONE-IODINE 15 ML: 10 SOLUTION TOPICAL at 10:27

## 2020-05-18 RX ADMIN — ROPIVACAINE HYDROCHLORIDE 25 ML: 5 INJECTION, SOLUTION EPIDURAL; INFILTRATION; PERINEURAL at 11:59

## 2020-05-18 RX ADMIN — ONDANSETRON 4 MG: 2 INJECTION INTRAMUSCULAR; INTRAVENOUS at 12:03

## 2020-05-18 RX ADMIN — OXYCODONE HYDROCHLORIDE 10 MG: 5 SOLUTION ORAL at 13:50

## 2020-05-18 RX ADMIN — ACETAMINOPHEN 1000 MG: 500 TABLET ORAL at 10:48

## 2020-05-18 RX ADMIN — MIDAZOLAM HYDROCHLORIDE 1 MG: 1 INJECTION, SOLUTION INTRAMUSCULAR; INTRAVENOUS at 11:45

## 2020-05-18 RX ADMIN — LIDOCAINE HYDROCHLORIDE 0.5 ML: 10 INJECTION, SOLUTION EPIDURAL; INFILTRATION; INTRACAUDAL at 10:27

## 2020-05-18 RX ADMIN — KETOROLAC TROMETHAMINE 15 MG: 30 INJECTION, SOLUTION INTRAMUSCULAR at 18:00

## 2020-05-18 RX ADMIN — DEXAMETHASONE SODIUM PHOSPHATE 6 MG: 4 INJECTION, SOLUTION INTRA-ARTICULAR; INTRALESIONAL; INTRAMUSCULAR; INTRAVENOUS; SOFT TISSUE at 12:03

## 2020-05-18 RX ADMIN — FENTANYL CITRATE 25 MCG: 0.05 INJECTION, SOLUTION INTRAMUSCULAR; INTRAVENOUS at 14:30

## 2020-05-18 RX ADMIN — SODIUM CHLORIDE, POTASSIUM CHLORIDE, SODIUM LACTATE AND CALCIUM CHLORIDE: 600; 310; 30; 20 INJECTION, SOLUTION INTRAVENOUS at 10:28

## 2020-05-18 RX ADMIN — PROPOFOL 150 MG: 10 INJECTION, EMULSION INTRAVENOUS at 11:50

## 2020-05-18 RX ADMIN — LIDOCAINE HYDROCHLORIDE 80 MG: 20 INJECTION, SOLUTION EPIDURAL; INFILTRATION; INTRACAUDAL at 11:50

## 2020-05-18 RX ADMIN — ACETAMINOPHEN 650 MG: 325 TABLET, FILM COATED ORAL at 23:13

## 2020-05-18 RX ADMIN — FENTANYL CITRATE 50 MCG: 50 INJECTION INTRAMUSCULAR; INTRAVENOUS at 12:23

## 2020-05-18 RX ADMIN — CEFOTETAN DISODIUM 2 G: 2 INJECTION, POWDER, FOR SOLUTION INTRAMUSCULAR; INTRAVENOUS at 11:50

## 2020-05-18 RX ADMIN — SODIUM CHLORIDE, POTASSIUM CHLORIDE, SODIUM LACTATE AND CALCIUM CHLORIDE: 600; 310; 30; 20 INJECTION, SOLUTION INTRAVENOUS at 18:00

## 2020-05-18 RX ADMIN — ROPIVACAINE HYDROCHLORIDE 25 ML: 5 INJECTION, SOLUTION EPIDURAL; INFILTRATION; PERINEURAL at 11:57

## 2020-05-18 RX ADMIN — ONDANSETRON HYDROCHLORIDE 4 MG: 2 SOLUTION INTRAMUSCULAR; INTRAVENOUS at 13:57

## 2020-05-18 RX ADMIN — Medication 0.5 ML: at 10:27

## 2020-05-18 RX ADMIN — ACETAMINOPHEN 650 MG: 325 TABLET, FILM COATED ORAL at 18:00

## 2020-05-18 RX ADMIN — GABAPENTIN 100 MG: 100 CAPSULE ORAL at 10:48

## 2020-05-18 RX ADMIN — ROCURONIUM BROMIDE 50 MG: 10 INJECTION, SOLUTION INTRAVENOUS at 11:50

## 2020-05-18 ASSESSMENT — COGNITIVE AND FUNCTIONAL STATUS - GENERAL
CLIMB 3 TO 5 STEPS WITH RAILING: A LITTLE
MOBILITY SCORE: 18
DRESSING REGULAR LOWER BODY CLOTHING: A LITTLE
SUGGESTED CMS G CODE MODIFIER MOBILITY: CK
TURNING FROM BACK TO SIDE WHILE IN FLAT BAD: A LITTLE
MOVING FROM LYING ON BACK TO SITTING ON SIDE OF FLAT BED: A LITTLE
DAILY ACTIVITIY SCORE: 20
MOVING TO AND FROM BED TO CHAIR: A LITTLE
STANDING UP FROM CHAIR USING ARMS: A LITTLE
WALKING IN HOSPITAL ROOM: A LITTLE
DRESSING REGULAR UPPER BODY CLOTHING: A LITTLE
TOILETING: A LITTLE
HELP NEEDED FOR BATHING: A LITTLE
SUGGESTED CMS G CODE MODIFIER DAILY ACTIVITY: CJ

## 2020-05-18 ASSESSMENT — LIFESTYLE VARIABLES
CONSUMPTION TOTAL: NEGATIVE
EVER FELT BAD OR GUILTY ABOUT YOUR DRINKING: NO
HOW MANY TIMES IN THE PAST YEAR HAVE YOU HAD 5 OR MORE DRINKS IN A DAY: 0
TOTAL SCORE: 0
AVERAGE NUMBER OF DAYS PER WEEK YOU HAVE A DRINK CONTAINING ALCOHOL: 0
TOTAL SCORE: 0
HAVE YOU EVER FELT YOU SHOULD CUT DOWN ON YOUR DRINKING: NO
TOTAL SCORE: 0
ALCOHOL_USE: NO
EVER_SMOKED: NEVER
HAVE PEOPLE ANNOYED YOU BY CRITICIZING YOUR DRINKING: NO
EVER HAD A DRINK FIRST THING IN THE MORNING TO STEADY YOUR NERVES TO GET RID OF A HANGOVER: NO
ON A TYPICAL DAY WHEN YOU DRINK ALCOHOL HOW MANY DRINKS DO YOU HAVE: 0

## 2020-05-18 ASSESSMENT — COPD QUESTIONNAIRES
DO YOU EVER COUGH UP ANY MUCUS OR PHLEGM?: NO/ONLY WITH OCCASIONAL COLDS OR INFECTIONS
HAVE YOU SMOKED AT LEAST 100 CIGARETTES IN YOUR ENTIRE LIFE: NO/DON'T KNOW
IN THE PAST 12 MONTHS DO YOU DO LESS THAN YOU USED TO BECAUSE OF YOUR BREATHING PROBLEMS: DISAGREE/UNSURE
DURING THE PAST 4 WEEKS HOW MUCH DID YOU FEEL SHORT OF BREATH: NONE/LITTLE OF THE TIME
COPD SCREENING SCORE: 2

## 2020-05-18 ASSESSMENT — FIBROSIS 4 INDEX: FIB4 SCORE: 1.4

## 2020-05-18 ASSESSMENT — PAIN SCALES - GENERAL: PAIN_LEVEL: 3

## 2020-05-18 NOTE — PROGRESS NOTES
Med rec updated and complete  Allergies reviewed  Used Ipad interpretor spoke with (Christofer [id number])   Pt reports no antibiotics in the last 2 weeks, pt was on FLAGYL 500MG and CEFTIN 500MG on 5/1/2020 for 7 day course, pt did finish's antibiotics.

## 2020-05-18 NOTE — ANESTHESIA PROCEDURE NOTES
Airway    Date/Time: 5/18/2020 11:51 AM  Performed by: Tesha Shirley M.D.  Authorized by: Tesha Shirley M.D.     Location:  OR  Urgency:  Elective  Difficult Airway: No    Indications for Airway Management:  Anesthesia      Spontaneous Ventilation: absent    Sedation Level:  Deep  Preoxygenated: Yes    Patient Position:  Sniffing  Final Airway Type:  Endotracheal airway  Final Endotracheal Airway:  ETT  Cuffed: Yes    Technique Used for Successful ETT Placement:  Video laryngoscopy    Insertion Site:  Oral  Blade Type:  Melissa  Laryngoscope Blade/Videolaryngoscope Blade Size:  3  ETT Size (mm):  7.0  Measured from:  Lips  ETT to Lips (cm):  23  Placement Verified by: auscultation and capnometry    Cormack-Lehane Classification:  Grade I - full view of glottis  Number of Attempts at Approach:  1  Number of Other Approaches Attempted:  0

## 2020-05-18 NOTE — OR NURSING
Patient AAOx4, drowsy, arouses easily to voice. Abdominal pain 7/10 post op, down to 5/10 with oral and IV pain medication. Nausea improved with zofran, tolerating sips of water and juice. VSS, afebrile. 10L oxymask per ERAS, breathing even and unlabored. Surgical incision CDI, ice applied. Son updated on status and plan of care. Belongings at bedside.

## 2020-05-18 NOTE — ANESTHESIA PROCEDURE NOTES
Peripheral Block    Date/Time: 5/18/2020 11:54 AM  Performed by: Tesha Shirley M.D.  Authorized by: Tesha Shirley M.D.     Start Time:  5/18/2020 11:54 AM  End Time:  5/18/2020 11:59 AM  Reason for Block: at surgeon's request and post-op pain management    patient identified, IV checked, site marked, risks and benefits discussed, surgical consent, monitors and equipment checked, pre-op evaluation and timeout performed    Patient Position:  Supine  Prep: ChloraPrep    Monitoring:  Heart rate, continuous pulse ox and cardiac monitor  Block Region:  Trunk  Trunk - Block Type:  Abdominal plane block - TAP block    Laterality:  Bilateral  Procedures: ultrasound guided  Image captured, interpreted and electronically stored.  Local Infiltration:  Lidocaine  Block Type:  Single-shot  Needle Length:  100mm  Needle Gauge:  21 G  Needle Localization:  Ultrasound guidance  Injection Assessment:  Negative aspiration for heme, no paresthesia on injection, incremental injection and local visualized surrounding nerve on ultrasound  Evidence of intravascular injection: No     US Guided Bilateral Transversus Abdominis Plane (TAP) Block   US probe placed at the anterior axillary line between the costal margin and iliac crest in an axial plane. External Oblique, Internal Oblique (IO) and Transversis Abdominis (TA) muscles identified.  Needle inserted anteromedial to probe in an in plane approach and advanced under direct visualization to TAP between IO and TA muscled.  After negative aspiration LA injected with ease and visualized spreading in TAP plane on both sides.

## 2020-05-18 NOTE — OR NURSING
Assume care for pt in pre-op. Patient allergies and NPO status verified. Belongings secured. Patient verbalizes understanding of pain scale, expected course of stay and plan of care. Surgical site verified with patient. IV access established. FBS= 97. Call light within reach. No further needs at this time. Hourly rounding in place.

## 2020-05-18 NOTE — ANESTHESIA PREPROCEDURE EVALUATION
Relevant Problems   ENDO   (+) Type 2 diabetes mellitus without complication, without long-term current use of insulin (HCC)   (+) Diveticulitis    Physical Exam    Airway   Mallampati: II  TM distance: >3 FB  Neck ROM: full       Cardiovascular - normal exam  Rhythm: regular  Rate: normal  (-) murmur     Dental - normal exam           Pulmonary - normal exam  Breath sounds clear to auscultation     Abdominal    Neurological - normal exam                 Anesthesia Plan    ASA 2       Plan - general and peripheral nerve block     Peripheral nerve block will be post-op pain control  Airway plan will be ETT      Plan Factors:   Patient was not previously instructed to abstain from smoking on day of procedure.  Patient did not smoke on day of procedure.      Induction: intravenous    Postoperative Plan: Postoperative administration of opioids is intended.    Pertinent diagnostic labs and testing reviewed    Informed Consent:    Anesthetic plan and risks discussed with patient.    Use of blood products discussed with: patient whom consented to blood products.

## 2020-05-18 NOTE — OP REPORT
DATE OF SERVICE:  05/18/2020    SURGEON:  Arun Mathur MD    ASSISTANT:  Huber Stanley MD    PREOPERATIVE DIAGNOSIS:  Chronic and refractory diverticulitis.    POSTOPERATIVE DIAGNOSIS:  Chronic and refractory diverticulitis.    PROCEDURE PERFORMED:  Open sigmoid colon resection with a low anterior   anastomosis.    ANESTHESIA:  General endotracheal anesthesia.    ANESTHESIOLOGIST:  Tesha Shirley MD    INDICATIONS:  A 65-year-old woman with diverticulitis, which has been   refractory to medical therapy.  Definitive surgical therapy is, therefore,   indicated.    The procedure was discussed in detail with the patient including the risk of   bleeding, infection, abscess, and hematoma.  I also discussed the risk of   anastomotic leak, anastomotic stricture, injury to retroperitoneal organs such   as the ureter, and intraperitoneal organs such as the small bowel.  She   understood all the above and wished to proceed.  She was placed under   anesthesia by Dr. Tesha Shirley.  Her abdomen was prepped and draped with   chlorhexidine prep and sterile drapes.  After a timeout, a midline incision   was made and through this incision, peritoneal cavity was entered.  Some   adhesions were noted; these were carefully taken down.  Palpation did reveal   some chronic inflammation and adhesions in the distal sigmoid colon.  Sigmoid   was mobilized by releasing the lateral peritoneal attachments.  During this   dissection, the ureter was identified and protected.  The sigmoid colon was   mobilized well below the peritoneal reflection onto the rectum.  A point of   normal sigmoid was chosen relatively approximately for division.  The colon   was divided at that point with stapler.  The mesocolon and proximal mesorectum   was taken down with the LigaSure device.  Again, the ureter was protected   during this dissection.  The mid rectum was divided with a contour stapler   where it appeared completely normal.  EEA was  sized and placed on the proximal   colon.  This was a 29 stapler.  This was secured with a 2-0 Prolene suture.    EEA was passed from below without difficulty and fired.  The anastomosis was   noted to be tension free, well perfused, and after assuring hemostasis, air   was insufflated through the sigmoidoscope.  There was no evidence of any   leakage consistent with an airtight anastomosis.  Hemostasis was assured.  A   closing tray as well as new gowns and gloves were used.  The fascia was   approximated with running PDS suture and the skin was stapled shut.  Prior to   closing, the peritoneal cavity was carefully inspected for any retained   sponge, instruments, or needles and none were noted.  The final counts were   reported as correct.  Wound class was class III contaminated.       ____________________________________     MD ROBERT DONG / STEPAN    DD:  05/18/2020 13:29:47  DT:  05/18/2020 15:31:27    D#:  [id number]  Job#:  [id number]

## 2020-05-18 NOTE — ANESTHESIA TIME REPORT
Anesthesia Start and Stop Event Times     Date Time Event    5/18/2020 10:28 AM Ready for Procedure    5/18/2020 11:45 AM Anesthesia Start    5/18/2020 01:19 PM Anesthesia Stop        Responsible Staff  05/18/20    Name Role Begin End    Tesha Shirley M.D. Anesthesiologist 05/18/20 11:45 AM 05/18/20 01:19 PM        Preop Diagnosis (Free Text):  Pre-op Diagnosis     CHRONIC DIVERTICULITIS LARGE INTESTINE        Preop Diagnosis (Codes):    Post op Diagnosis  Diverticulitis large intestine      Premium Reason  Non-Premium    Comments: Sigmoid colectomy

## 2020-05-18 NOTE — OR SURGEON
Immediate Post OP Note    PreOp Diagnosis: chronic diverticulitis    PostOp Diagnosis: chronic diverticulitis    Procedure(s):  COLECTOMY, SIGMOID - Wound Class: Contaminated    Surgeon(s):  USAMA Nieves M.D.    Anesthesiologist/Type of Anesthesia:  Anesthesiologist: Tesha Shirley M.D./General    Surgical Staff:  Circulator: Adriane Steve R.N.  Relief Circulator: Nadia Jose R.N.  Scrub Person: Christiano Porter    Specimens removed if any:  ID Type Source Tests Collected by Time Destination   A :  Tissue Colon - Sigmoid PATHOLOGY SPECIMEN Arun Mathur M.D. 5/18/2020 12:35 PM        Estimated Blood Loss: 25 cc    Findings: inflammation    Complications: none        5/18/2020 1:23 PM Arun Mathur M.D.

## 2020-05-18 NOTE — ANESTHESIA POSTPROCEDURE EVALUATION
Patient: Leti Arana    Procedure Summary     Date:  05/18/20 Room / Location:  [place] [address] / SURGERY [facility]    Anesthesia Start:  1145 Anesthesia Stop:  1319    Procedure:  COLECTOMY, SIGMOID (Abdomen) Diagnosis:  (CHRONIC DIVERTICULITIS LARGE INTESTINE)    Surgeon:  Arun Mathur M.D. Responsible Provider:  Tesha Shirley M.D.    Anesthesia Type:  general, peripheral nerve block ASA Status:  2          Final Anesthesia Type: general, peripheral nerve block  Last vitals  BP   Blood Pressure : 112/44    Temp   36.3 °C (97.4 °F)    Pulse   Pulse: 69   Resp   18    SpO2   100 %      Anesthesia Post Evaluation    Patient location during evaluation: PACU  Patient participation: complete - patient participated  Level of consciousness: awake and alert  Pain score: 3    Airway patency: patent  Anesthetic complications: no  Cardiovascular status: hemodynamically stable  Respiratory status: acceptable  Hydration status: euvolemic    PONV: none    patient able to participate, but full recovery from regional anesthesia has not occurred and is not expected within the stipulated timeframe for the completion of the evaluation       Nurse Pain Score: 3 (NPRS)

## 2020-05-19 LAB
ALBUMIN SERPL BCP-MCNC: 3.7 G/DL (ref 3.2–4.9)
ALBUMIN/GLOB SERPL: 1.3 G/DL
ALP SERPL-CCNC: 69 U/L (ref 30–99)
ALT SERPL-CCNC: 31 U/L (ref 2–50)
ANION GAP SERPL CALC-SCNC: 14 MMOL/L (ref 7–16)
AST SERPL-CCNC: 30 U/L (ref 12–45)
BASOPHILS # BLD AUTO: 0.3 % (ref 0–1.8)
BASOPHILS # BLD: 0.05 K/UL (ref 0–0.12)
BILIRUB SERPL-MCNC: 0.6 MG/DL (ref 0.1–1.5)
BUN SERPL-MCNC: 9 MG/DL (ref 8–22)
CALCIUM SERPL-MCNC: 9.5 MG/DL (ref 8.5–10.5)
CHLORIDE SERPL-SCNC: 99 MMOL/L (ref 96–112)
CO2 SERPL-SCNC: 22 MMOL/L (ref 20–33)
CREAT SERPL-MCNC: 0.64 MG/DL (ref 0.5–1.4)
EOSINOPHIL # BLD AUTO: 0 K/UL (ref 0–0.51)
EOSINOPHIL NFR BLD: 0 % (ref 0–6.9)
ERYTHROCYTE [DISTWIDTH] IN BLOOD BY AUTOMATED COUNT: 40.6 FL (ref 35.9–50)
GLOBULIN SER CALC-MCNC: 2.9 G/DL (ref 1.9–3.5)
GLUCOSE SERPL-MCNC: 178 MG/DL (ref 65–99)
HCT VFR BLD AUTO: 39.6 % (ref 37–47)
HGB BLD-MCNC: 13.2 G/DL (ref 12–16)
IMM GRANULOCYTES # BLD AUTO: 0.1 K/UL (ref 0–0.11)
IMM GRANULOCYTES NFR BLD AUTO: 0.6 % (ref 0–0.9)
LYMPHOCYTES # BLD AUTO: 1.96 K/UL (ref 1–4.8)
LYMPHOCYTES NFR BLD: 11.6 % (ref 22–41)
MCH RBC QN AUTO: 30.7 PG (ref 27–33)
MCHC RBC AUTO-ENTMCNC: 33.3 G/DL (ref 33.6–35)
MCV RBC AUTO: 92.1 FL (ref 81.4–97.8)
MONOCYTES # BLD AUTO: 1.01 K/UL (ref 0–0.85)
MONOCYTES NFR BLD AUTO: 6 % (ref 0–13.4)
NEUTROPHILS # BLD AUTO: 13.77 K/UL (ref 2–7.15)
NEUTROPHILS NFR BLD: 81.5 % (ref 44–72)
NRBC # BLD AUTO: 0 K/UL
NRBC BLD-RTO: 0 /100 WBC
PLATELET # BLD AUTO: 232 K/UL (ref 164–446)
PMV BLD AUTO: 9 FL (ref 9–12.9)
POTASSIUM SERPL-SCNC: 4 MMOL/L (ref 3.6–5.5)
PROT SERPL-MCNC: 6.6 G/DL (ref 6–8.2)
RBC # BLD AUTO: 4.3 M/UL (ref 4.2–5.4)
SODIUM SERPL-SCNC: 135 MMOL/L (ref 135–145)
WBC # BLD AUTO: 16.9 K/UL (ref 4.8–10.8)

## 2020-05-19 PROCEDURE — 80053 COMPREHEN METABOLIC PANEL: CPT

## 2020-05-19 PROCEDURE — 700102 HCHG RX REV CODE 250 W/ 637 OVERRIDE(OP): Performed by: SURGERY

## 2020-05-19 PROCEDURE — 85025 COMPLETE CBC W/AUTO DIFF WBC: CPT

## 2020-05-19 PROCEDURE — 770006 HCHG ROOM/CARE - MED/SURG/GYN SEMI*

## 2020-05-19 PROCEDURE — 700111 HCHG RX REV CODE 636 W/ 250 OVERRIDE (IP): Performed by: SURGERY

## 2020-05-19 PROCEDURE — A9270 NON-COVERED ITEM OR SERVICE: HCPCS | Performed by: SURGERY

## 2020-05-19 PROCEDURE — 36415 COLL VENOUS BLD VENIPUNCTURE: CPT

## 2020-05-19 RX ADMIN — ACETAMINOPHEN 650 MG: 325 TABLET, FILM COATED ORAL at 16:59

## 2020-05-19 RX ADMIN — ENOXAPARIN SODIUM 40 MG: 100 INJECTION SUBCUTANEOUS at 06:02

## 2020-05-19 RX ADMIN — ACETAMINOPHEN 650 MG: 325 TABLET, FILM COATED ORAL at 23:54

## 2020-05-19 RX ADMIN — ACETAMINOPHEN 650 MG: 325 TABLET, FILM COATED ORAL at 11:49

## 2020-05-19 RX ADMIN — KETOROLAC TROMETHAMINE 15 MG: 30 INJECTION, SOLUTION INTRAMUSCULAR at 11:49

## 2020-05-19 RX ADMIN — KETOROLAC TROMETHAMINE 15 MG: 30 INJECTION, SOLUTION INTRAMUSCULAR at 06:03

## 2020-05-19 RX ADMIN — OXYCODONE 5 MG: 5 TABLET ORAL at 16:59

## 2020-05-19 RX ADMIN — KETOROLAC TROMETHAMINE 15 MG: 30 INJECTION, SOLUTION INTRAMUSCULAR at 00:50

## 2020-05-19 RX ADMIN — KETOROLAC TROMETHAMINE 15 MG: 30 INJECTION, SOLUTION INTRAMUSCULAR at 23:54

## 2020-05-19 RX ADMIN — OXYCODONE 5 MG: 5 TABLET ORAL at 22:49

## 2020-05-19 RX ADMIN — ACETAMINOPHEN 650 MG: 325 TABLET, FILM COATED ORAL at 06:01

## 2020-05-19 RX ADMIN — KETOROLAC TROMETHAMINE 15 MG: 30 INJECTION, SOLUTION INTRAMUSCULAR at 16:59

## 2020-05-19 ASSESSMENT — ENCOUNTER SYMPTOMS
FEVER: 0
MYALGIAS: 1
COUGH: 0
ABDOMINAL PAIN: 0
NAUSEA: 0
SHORTNESS OF BREATH: 0
VOMITING: 0
ROS GI COMMENTS: BM 5/18

## 2020-05-19 NOTE — CARE PLAN
Problem: Communication  Goal: The ability to communicate needs accurately and effectively will improve  Outcome: PROGRESSING AS EXPECTED   Education provided on importance of using call light to alert staff of patient needs. Pt demonstrates understanding by using call light appropriately.     Problem: Knowledge Deficit  Goal: Knowledge of disease process/condition, treatment plan, diagnostic tests, and medications will improve  Outcome: PROGRESSING AS EXPECTED   POC discussed. Questions and concerns addressed.

## 2020-05-19 NOTE — PROGRESS NOTES
AA&Ox4.  RA. Denies SOB.  Pt c/o of gas discomfort but denies any pain.  MLI with old drainage noted to island dressing. Dry and intact.   Advanced to regular diet for lunch. Denies N/V.  + void.   + BM.   Pt ambulating in hallways frequently with assist x1.   Language line utilized for  services.  All needs met at this time. Call light within reach. Pt calls appropriately.

## 2020-05-19 NOTE — CARE PLAN
Problem: Communication  Goal: The ability to communicate needs accurately and effectively will improve  Outcome: PROGRESSING AS EXPECTED  Interpretor in use. Swedish speaking staff available.     Problem: Safety  Goal: Will remain free from injury  Outcome: PROGRESSING AS EXPECTED  Patient bed is alarmed. Call bell within reach.   Educated regarding use of call bell.      Problem: Knowledge Deficit  Goal: Knowledge of disease process/condition, treatment plan, diagnostic tests, and medications will improve  Outcome: PROGRESSING AS EXPECTED   in use for education     Problem: Pain Management  Goal: Pain level will decrease to patient's comfort goal  Outcome: PROGRESSING AS EXPECTED  Patient received pain meds per emar  Ice pack placed on patient

## 2020-05-19 NOTE — PROGRESS NOTES
Pt arrived on unit around 1545 via gurney with CNA.  Ambulated from gurney to bathroom with assist x1.   Pt unable to tolerate sitting up in chair after using restroom d/t drowsiness.  AA&Ox4. Pt primarily Divehi Speaking. Staff able to assist with translating.  Pt on 10L via oxymask per ERAS protocol. To be DCd at 2000.   MLI with island dressing intact. Small shadowing noted.  Tolerating full liquid diet. Denies N/V.   Awaiting void post procedure. DTV at 2000.  LBM 5/18 PTA.  Pt oriented to unit, room, and introduced to staff.  All needs met at this time. Call light within reach. Pt calls appropriately.

## 2020-05-19 NOTE — ASSESSMENT & PLAN NOTE
5/18 Open sigmoid colon resection with a low anterior anastomosis.  Arun Mathur MD. General Surgery

## 2020-05-19 NOTE — PROGRESS NOTES
2 RN skin check complete with SOFIA Del Rosario.  Surgical incision noted to abdomen with dressing intact. No signs of skin breakdown present.

## 2020-05-19 NOTE — PROGRESS NOTES
Trauma / Surgical Daily Progress Note    Date of Service  5/19/2020    Chief Complaint  65 y.o. female admitted 5/18/2020 with CHRONIC DIVERTICULITIS LARGE INTESTINE    Interval Events  POD#1 Open sigmoid colon resection with a low anterior anastomosis  Abdomen soft, tender to left/midline  Midline dressing in place, old drainage  Tolerating diet    - Advance diet as tolerated  - Mobilize    Review of Systems  Review of Systems   Constitutional: Negative for fever.   Respiratory: Negative for cough and shortness of breath.    Gastrointestinal: Negative for abdominal pain, nausea and vomiting.        BM 5/18   Genitourinary:        Voiding   Musculoskeletal: Positive for myalgias.        Vital Signs  Temp:  [35.9 °C (96.7 °F)-37.3 °C (99.1 °F)] 37.3 °C (99.1 °F)  Pulse:  [64-84] 79  Resp:  [15-26] 18  BP: ()/(34-68) 107/65  SpO2:  [91 %-100 %] 93 %    Physical Exam  Physical Exam  Vitals signs and nursing note reviewed.   Constitutional:       General: She is not in acute distress.  HENT:      Head: Normocephalic.      Nose: Nose normal.      Mouth/Throat:      Mouth: Mucous membranes are moist.   Eyes:      Pupils: Pupils are equal, round, and reactive to light.   Neck:      Musculoskeletal: Normal range of motion.   Cardiovascular:      Rate and Rhythm: Normal rate.      Pulses: Normal pulses.   Pulmonary:      Effort: Pulmonary effort is normal.   Abdominal:      General: Abdomen is flat. Bowel sounds are normal.      Palpations: Abdomen is soft.      Tenderness: There is abdominal tenderness (Left greater than right).      Comments: Midline incision with dressing in place, old drainage   Musculoskeletal: Normal range of motion.   Skin:     General: Skin is warm and dry.   Neurological:      Mental Status: She is alert and oriented to person, place, and time.   Psychiatric:         Mood and Affect: Mood normal.         Behavior: Behavior normal.         Laboratory  Recent Results (from the past 24 hour(s))    Histology Request    Collection Time: 05/18/20  1:54 PM   Result Value Ref Range    Pathology Request Sent to Histo    CBC with Differential: Tomorrow AM    Collection Time: 05/19/20  3:49 AM   Result Value Ref Range    WBC 16.9 (H) 4.8 - 10.8 K/uL    RBC 4.30 4.20 - 5.40 M/uL    Hemoglobin 13.2 12.0 - 16.0 g/dL    Hematocrit 39.6 37.0 - 47.0 %    MCV 92.1 81.4 - 97.8 fL    MCH 30.7 27.0 - 33.0 pg    MCHC 33.3 (L) 33.6 - 35.0 g/dL    RDW 40.6 35.9 - 50.0 fL    Platelet Count 232 164 - 446 K/uL    MPV 9.0 9.0 - 12.9 fL    Neutrophils-Polys 81.50 (H) 44.00 - 72.00 %    Lymphocytes 11.60 (L) 22.00 - 41.00 %    Monocytes 6.00 0.00 - 13.40 %    Eosinophils 0.00 0.00 - 6.90 %    Basophils 0.30 0.00 - 1.80 %    Immature Granulocytes 0.60 0.00 - 0.90 %    Nucleated RBC 0.00 /100 WBC    Neutrophils (Absolute) 13.77 (H) 2.00 - 7.15 K/uL    Lymphs (Absolute) 1.96 1.00 - 4.80 K/uL    Monos (Absolute) 1.01 (H) 0.00 - 0.85 K/uL    Eos (Absolute) 0.00 0.00 - 0.51 K/uL    Baso (Absolute) 0.05 0.00 - 0.12 K/uL    Immature Granulocytes (abs) 0.10 0.00 - 0.11 K/uL    NRBC (Absolute) 0.00 K/uL   Comp Metabolic Panel (CMP): Tomorrow AM    Collection Time: 05/19/20  3:49 AM   Result Value Ref Range    Sodium 135 135 - 145 mmol/L    Potassium 4.0 3.6 - 5.5 mmol/L    Chloride 99 96 - 112 mmol/L    Co2 22 20 - 33 mmol/L    Anion Gap 14.0 7.0 - 16.0    Glucose 178 (H) 65 - 99 mg/dL    Bun 9 8 - 22 mg/dL    Creatinine 0.64 0.50 - 1.40 mg/dL    Calcium 9.5 8.5 - 10.5 mg/dL    AST(SGOT) 30 12 - 45 U/L    ALT(SGPT) 31 2 - 50 U/L    Alkaline Phosphatase 69 30 - 99 U/L    Total Bilirubin 0.6 0.1 - 1.5 mg/dL    Albumin 3.7 3.2 - 4.9 g/dL    Total Protein 6.6 6.0 - 8.2 g/dL    Globulin 2.9 1.9 - 3.5 g/dL    A-G Ratio 1.3 g/dL   ESTIMATED GFR    Collection Time: 05/19/20  3:49 AM   Result Value Ref Range    GFR If African American >60 >60 mL/min/1.73 m 2    GFR If Non African American >60 >60 mL/min/1.73 m 2       Fluids    Intake/Output Summary  (Last 24 hours) at 5/19/2020 1215  Last data filed at 5/19/2020 1000  Gross per 24 hour   Intake 2250 ml   Output 220 ml   Net 2030 ml       Core Measures & Quality Metrics  Medications reviewed, Labs reviewed and Radiology images reviewed  Anand catheter: No Anand      DVT Prophylaxis: Enoxaparin (Lovenox)  DVT prophylaxis - mechanical: SCDs  Ulcer prophylaxis: Not indicated        DANE Score  ETOH Screening    Assessment/Plan  Diverticulitis- (present on admission)  Assessment & Plan  5/18 Open sigmoid colon resection with a low anterior anastomosis.  Arun Mathur MD. General Surgery        Discussed patient condition with RN, Patient and trauma surgery. Dr. Mathur

## 2020-05-20 LAB
ANION GAP SERPL CALC-SCNC: 7 MMOL/L (ref 7–16)
BASOPHILS # BLD AUTO: 0.4 % (ref 0–1.8)
BASOPHILS # BLD: 0.05 K/UL (ref 0–0.12)
BUN SERPL-MCNC: 9 MG/DL (ref 8–22)
CALCIUM SERPL-MCNC: 8.4 MG/DL (ref 8.5–10.5)
CHLORIDE SERPL-SCNC: 104 MMOL/L (ref 96–112)
CO2 SERPL-SCNC: 25 MMOL/L (ref 20–33)
CREAT SERPL-MCNC: 0.54 MG/DL (ref 0.5–1.4)
EOSINOPHIL # BLD AUTO: 0.04 K/UL (ref 0–0.51)
EOSINOPHIL NFR BLD: 0.3 % (ref 0–6.9)
ERYTHROCYTE [DISTWIDTH] IN BLOOD BY AUTOMATED COUNT: 40.9 FL (ref 35.9–50)
GLUCOSE SERPL-MCNC: 134 MG/DL (ref 65–99)
HCT VFR BLD AUTO: 34.8 % (ref 37–47)
HGB BLD-MCNC: 11.6 G/DL (ref 12–16)
IMM GRANULOCYTES # BLD AUTO: 0.06 K/UL (ref 0–0.11)
IMM GRANULOCYTES NFR BLD AUTO: 0.4 % (ref 0–0.9)
LYMPHOCYTES # BLD AUTO: 2.76 K/UL (ref 1–4.8)
LYMPHOCYTES NFR BLD: 20.3 % (ref 22–41)
MCH RBC QN AUTO: 30.8 PG (ref 27–33)
MCHC RBC AUTO-ENTMCNC: 33.3 G/DL (ref 33.6–35)
MCV RBC AUTO: 92.3 FL (ref 81.4–97.8)
MONOCYTES # BLD AUTO: 0.77 K/UL (ref 0–0.85)
MONOCYTES NFR BLD AUTO: 5.7 % (ref 0–13.4)
NEUTROPHILS # BLD AUTO: 9.94 K/UL (ref 2–7.15)
NEUTROPHILS NFR BLD: 72.9 % (ref 44–72)
NRBC # BLD AUTO: 0 K/UL
NRBC BLD-RTO: 0 /100 WBC
PLATELET # BLD AUTO: 188 K/UL (ref 164–446)
PMV BLD AUTO: 9.3 FL (ref 9–12.9)
POTASSIUM SERPL-SCNC: 3.7 MMOL/L (ref 3.6–5.5)
RBC # BLD AUTO: 3.77 M/UL (ref 4.2–5.4)
SODIUM SERPL-SCNC: 136 MMOL/L (ref 135–145)
WBC # BLD AUTO: 13.6 K/UL (ref 4.8–10.8)

## 2020-05-20 PROCEDURE — 700102 HCHG RX REV CODE 250 W/ 637 OVERRIDE(OP): Performed by: SURGERY

## 2020-05-20 PROCEDURE — 700111 HCHG RX REV CODE 636 W/ 250 OVERRIDE (IP): Performed by: SURGERY

## 2020-05-20 PROCEDURE — 80048 BASIC METABOLIC PNL TOTAL CA: CPT

## 2020-05-20 PROCEDURE — 770006 HCHG ROOM/CARE - MED/SURG/GYN SEMI*

## 2020-05-20 PROCEDURE — A9270 NON-COVERED ITEM OR SERVICE: HCPCS | Performed by: SURGERY

## 2020-05-20 PROCEDURE — 36415 COLL VENOUS BLD VENIPUNCTURE: CPT

## 2020-05-20 PROCEDURE — 85025 COMPLETE CBC W/AUTO DIFF WBC: CPT

## 2020-05-20 RX ADMIN — KETOROLAC TROMETHAMINE 15 MG: 30 INJECTION, SOLUTION INTRAMUSCULAR at 17:52

## 2020-05-20 RX ADMIN — ENOXAPARIN SODIUM 40 MG: 100 INJECTION SUBCUTANEOUS at 05:33

## 2020-05-20 RX ADMIN — KETOROLAC TROMETHAMINE 15 MG: 30 INJECTION, SOLUTION INTRAMUSCULAR at 05:32

## 2020-05-20 RX ADMIN — ACETAMINOPHEN 650 MG: 325 TABLET, FILM COATED ORAL at 05:33

## 2020-05-20 RX ADMIN — OXYCODONE 5 MG: 5 TABLET ORAL at 20:01

## 2020-05-20 RX ADMIN — KETOROLAC TROMETHAMINE 15 MG: 30 INJECTION, SOLUTION INTRAMUSCULAR at 10:04

## 2020-05-20 RX ADMIN — ACETAMINOPHEN 650 MG: 325 TABLET, FILM COATED ORAL at 17:52

## 2020-05-20 RX ADMIN — ACETAMINOPHEN 650 MG: 325 TABLET, FILM COATED ORAL at 12:28

## 2020-05-20 ASSESSMENT — ENCOUNTER SYMPTOMS
MYALGIAS: 1
FEVER: 0
NAUSEA: 0
VOMITING: 0
SHORTNESS OF BREATH: 0
ROS GI COMMENTS: BM 5/20
ABDOMINAL PAIN: 0
COUGH: 0

## 2020-05-20 NOTE — PROGRESS NOTES
Trauma / Surgical Daily Progress Note    Date of Service  5/20/2020    Chief Complaint  65 y.o. female admitted 5/18/2020 with CHRONIC DIVERTICULITIS LARGE INTESTINE    Interval Events  Resting comfortably  WBC trend down, hgb trend down, likely dilutional  Complaints of generalized abdominal pain, some mid/upper pain with eating  BM overnight, denies nausea/vomiting  Abdomen soft, tender along incision, approximated with staples  Feels weak, encourage mobilization  Anticipate DC in the next 24-48 hours    Review of Systems  Review of Systems   Constitutional: Negative for fever.   Respiratory: Negative for cough and shortness of breath.    Gastrointestinal: Negative for abdominal pain, nausea and vomiting.        BM 5/20   Genitourinary:        Voiding   Musculoskeletal: Positive for myalgias.        Vital Signs  Temp:  [36.6 °C (97.9 °F)-37.3 °C (99.1 °F)] 36.9 °C (98.4 °F)  Pulse:  [79-87] 86  Resp:  [17-18] 17  BP: (107-123)/(65-71) 123/71  SpO2:  [93 %] 93 %    Physical Exam  Physical Exam  Vitals signs and nursing note reviewed.   Constitutional:       General: She is not in acute distress.  HENT:      Head: Normocephalic.      Nose: Nose normal.      Mouth/Throat:      Mouth: Mucous membranes are moist.   Eyes:      Pupils: Pupils are equal, round, and reactive to light.   Neck:      Musculoskeletal: Normal range of motion.   Cardiovascular:      Rate and Rhythm: Normal rate.      Pulses: Normal pulses.   Pulmonary:      Effort: Pulmonary effort is normal.   Abdominal:      General: Abdomen is flat. Bowel sounds are normal.      Palpations: Abdomen is soft.      Tenderness: There is abdominal tenderness (Left greater than right).      Comments: Midline incision approximated with staples   Musculoskeletal: Normal range of motion.   Skin:     General: Skin is warm and dry.   Neurological:      Mental Status: She is alert and oriented to person, place, and time.   Psychiatric:         Mood and Affect: Mood  normal.         Behavior: Behavior normal.         Laboratory  Recent Results (from the past 24 hour(s))   CBC WITH DIFFERENTIAL    Collection Time: 05/20/20 12:42 AM   Result Value Ref Range    WBC 13.6 (H) 4.8 - 10.8 K/uL    RBC 3.77 (L) 4.20 - 5.40 M/uL    Hemoglobin 11.6 (L) 12.0 - 16.0 g/dL    Hematocrit 34.8 (L) 37.0 - 47.0 %    MCV 92.3 81.4 - 97.8 fL    MCH 30.8 27.0 - 33.0 pg    MCHC 33.3 (L) 33.6 - 35.0 g/dL    RDW 40.9 35.9 - 50.0 fL    Platelet Count 188 164 - 446 K/uL    MPV 9.3 9.0 - 12.9 fL    Neutrophils-Polys 72.90 (H) 44.00 - 72.00 %    Lymphocytes 20.30 (L) 22.00 - 41.00 %    Monocytes 5.70 0.00 - 13.40 %    Eosinophils 0.30 0.00 - 6.90 %    Basophils 0.40 0.00 - 1.80 %    Immature Granulocytes 0.40 0.00 - 0.90 %    Nucleated RBC 0.00 /100 WBC    Neutrophils (Absolute) 9.94 (H) 2.00 - 7.15 K/uL    Lymphs (Absolute) 2.76 1.00 - 4.80 K/uL    Monos (Absolute) 0.77 0.00 - 0.85 K/uL    Eos (Absolute) 0.04 0.00 - 0.51 K/uL    Baso (Absolute) 0.05 0.00 - 0.12 K/uL    Immature Granulocytes (abs) 0.06 0.00 - 0.11 K/uL    NRBC (Absolute) 0.00 K/uL   Basic Metabolic Panel    Collection Time: 05/20/20 12:42 AM   Result Value Ref Range    Sodium 136 135 - 145 mmol/L    Potassium 3.7 3.6 - 5.5 mmol/L    Chloride 104 96 - 112 mmol/L    Co2 25 20 - 33 mmol/L    Glucose 134 (H) 65 - 99 mg/dL    Bun 9 8 - 22 mg/dL    Creatinine 0.54 0.50 - 1.40 mg/dL    Calcium 8.4 (L) 8.5 - 10.5 mg/dL    Anion Gap 7.0 7.0 - 16.0   ESTIMATED GFR    Collection Time: 05/20/20 12:42 AM   Result Value Ref Range    GFR If African American >60 >60 mL/min/1.73 m 2    GFR If Non African American >60 >60 mL/min/1.73 m 2       Fluids    Intake/Output Summary (Last 24 hours) at 5/20/2020 1121  Last data filed at 5/20/2020 0300  Gross per 24 hour   Intake 300 ml   Output --   Net 300 ml       Core Measures & Quality Metrics  Medications reviewed, Labs reviewed and Radiology images reviewed  Anand catheter: No Anand      DVT Prophylaxis:  Enoxaparin (Lovenox)  DVT prophylaxis - mechanical: SCDs  Ulcer prophylaxis: Not indicated        RAP Score Total: 0    ETOH Screening    Assessment/Plan  Diverticulitis- (present on admission)  Assessment & Plan  5/18 Open sigmoid colon resection with a low anterior anastomosis.  Arun Mathur MD. General Surgery        Discussed patient condition with RN, Patient and trauma surgery. Dr. Mathur

## 2020-05-20 NOTE — CARE PLAN
Problem: Communication  Goal: The ability to communicate needs accurately and effectively will improve  Outcome: PROGRESSING AS EXPECTED      Patient is Aox4 and is able to make her needs known      Patient is able to demonstrate use of the call bell.    Problem: Safety  Goal: Will remain free from injury  Outcome: PROGRESSING AS EXPECTED  Patient is able to demonstrate use of the call bell.  Patient is educated regarding fall prevention.     Problem: Venous Thromboembolism (VTW)/Deep Vein Thrombosis (DVT) Prevention:  Goal: Patient will participate in Venous Thrombosis (VTE)/Deep Vein Thrombosis (DVT)Prevention Measures  Outcome: PROGRESSING AS EXPECTED  Patient receiving Lovenox for VTE.  Patient walking to BRP or in halls frequently.     Problem: Pain Management  Goal: Pain level will decrease to patient's comfort goal  Outcome: PROGRESSING AS EXPECTED  Patient receiving meds for pain per emar.  Patient has an ice pack on abdomen.

## 2020-05-20 NOTE — CARE PLAN
Problem: Communication  Goal: The ability to communicate needs accurately and effectively will improve  Outcome: PROGRESSING AS EXPECTED  Note: Utilize Irish speaking staff to effectively communicate with patient and review plan of care      Problem: Pain Management  Goal: Pain level will decrease to patient's comfort goal  Outcome: PROGRESSING AS EXPECTED  Note: Assess pain Q2-4H, administer PRN as indicated, encourage patient to voice pain to staff

## 2020-05-20 NOTE — PROGRESS NOTES
Bedside report received.  Assessment complete.   A&O x 4. Patient calls appropriately.  Patient ambulates with no assist.    Patient has 3/10 pain. Pain managed with prescribed medications.  Denies N&V. Tolerating regular diet.  MLI with island dressing, small old drainage present, otherwise CDI   + void, + flatus, + BM.  Patient denies SOB.  SCD's off.  Patient primarily Austrian speaking.  Review plan with of care with patient. Call light and personal belongings with in reach. Hourly rounding in place. All needs met at this time.

## 2020-05-21 VITALS
SYSTOLIC BLOOD PRESSURE: 103 MMHG | TEMPERATURE: 98.2 F | WEIGHT: 152.78 LBS | OXYGEN SATURATION: 94 % | DIASTOLIC BLOOD PRESSURE: 68 MMHG | HEIGHT: 64 IN | RESPIRATION RATE: 16 BRPM | HEART RATE: 85 BPM | BODY MASS INDEX: 26.08 KG/M2

## 2020-05-21 LAB
ANION GAP SERPL CALC-SCNC: 11 MMOL/L (ref 7–16)
BASOPHILS # BLD AUTO: 0.4 % (ref 0–1.8)
BASOPHILS # BLD: 0.05 K/UL (ref 0–0.12)
BUN SERPL-MCNC: 5 MG/DL (ref 8–22)
CALCIUM SERPL-MCNC: 9.6 MG/DL (ref 8.5–10.5)
CHLORIDE SERPL-SCNC: 96 MMOL/L (ref 96–112)
CO2 SERPL-SCNC: 26 MMOL/L (ref 20–33)
CREAT SERPL-MCNC: 0.56 MG/DL (ref 0.5–1.4)
EOSINOPHIL # BLD AUTO: 0.25 K/UL (ref 0–0.51)
EOSINOPHIL NFR BLD: 1.8 % (ref 0–6.9)
ERYTHROCYTE [DISTWIDTH] IN BLOOD BY AUTOMATED COUNT: 41.7 FL (ref 35.9–50)
GLUCOSE SERPL-MCNC: 125 MG/DL (ref 65–99)
HCT VFR BLD AUTO: 37.6 % (ref 37–47)
HGB BLD-MCNC: 12.3 G/DL (ref 12–16)
IMM GRANULOCYTES # BLD AUTO: 0.06 K/UL (ref 0–0.11)
IMM GRANULOCYTES NFR BLD AUTO: 0.4 % (ref 0–0.9)
LYMPHOCYTES # BLD AUTO: 3.13 K/UL (ref 1–4.8)
LYMPHOCYTES NFR BLD: 23 % (ref 22–41)
MAGNESIUM SERPL-MCNC: 2 MG/DL (ref 1.5–2.5)
MCH RBC QN AUTO: 30.6 PG (ref 27–33)
MCHC RBC AUTO-ENTMCNC: 32.7 G/DL (ref 33.6–35)
MCV RBC AUTO: 93.5 FL (ref 81.4–97.8)
MONOCYTES # BLD AUTO: 0.87 K/UL (ref 0–0.85)
MONOCYTES NFR BLD AUTO: 6.4 % (ref 0–13.4)
NEUTROPHILS # BLD AUTO: 9.26 K/UL (ref 2–7.15)
NEUTROPHILS NFR BLD: 68 % (ref 44–72)
NRBC # BLD AUTO: 0 K/UL
NRBC BLD-RTO: 0 /100 WBC
PHOSPHATE SERPL-MCNC: 3.6 MG/DL (ref 2.5–4.5)
PLATELET # BLD AUTO: 210 K/UL (ref 164–446)
PMV BLD AUTO: 8.9 FL (ref 9–12.9)
POTASSIUM SERPL-SCNC: 3.8 MMOL/L (ref 3.6–5.5)
RBC # BLD AUTO: 4.02 M/UL (ref 4.2–5.4)
SODIUM SERPL-SCNC: 133 MMOL/L (ref 135–145)
WBC # BLD AUTO: 13.6 K/UL (ref 4.8–10.8)

## 2020-05-21 PROCEDURE — 80048 BASIC METABOLIC PNL TOTAL CA: CPT

## 2020-05-21 PROCEDURE — 85025 COMPLETE CBC W/AUTO DIFF WBC: CPT

## 2020-05-21 PROCEDURE — 700102 HCHG RX REV CODE 250 W/ 637 OVERRIDE(OP): Performed by: SURGERY

## 2020-05-21 PROCEDURE — 84100 ASSAY OF PHOSPHORUS: CPT

## 2020-05-21 PROCEDURE — 700111 HCHG RX REV CODE 636 W/ 250 OVERRIDE (IP): Performed by: SURGERY

## 2020-05-21 PROCEDURE — 36415 COLL VENOUS BLD VENIPUNCTURE: CPT

## 2020-05-21 PROCEDURE — A9270 NON-COVERED ITEM OR SERVICE: HCPCS | Performed by: SURGERY

## 2020-05-21 PROCEDURE — 83735 ASSAY OF MAGNESIUM: CPT

## 2020-05-21 RX ORDER — OXYCODONE HYDROCHLORIDE 5 MG/1
5 TABLET ORAL
Qty: 20 TAB | Refills: 0 | Status: SHIPPED | OUTPATIENT
Start: 2020-05-21 | End: 2020-05-28

## 2020-05-21 RX ORDER — ONDANSETRON 4 MG/1
4 TABLET, ORALLY DISINTEGRATING ORAL EVERY 6 HOURS PRN
Qty: 10 TAB | Refills: 0 | Status: SHIPPED | OUTPATIENT
Start: 2020-05-21 | End: 2021-11-30

## 2020-05-21 RX ORDER — ACETAMINOPHEN 325 MG/1
650 TABLET ORAL EVERY 6 HOURS PRN
COMMUNITY
Start: 2020-05-21 | End: 2022-04-02

## 2020-05-21 RX ADMIN — ONDANSETRON 4 MG: 2 INJECTION INTRAMUSCULAR; INTRAVENOUS at 12:30

## 2020-05-21 RX ADMIN — ACETAMINOPHEN 650 MG: 325 TABLET, FILM COATED ORAL at 12:30

## 2020-05-21 RX ADMIN — OXYCODONE 5 MG: 5 TABLET ORAL at 09:18

## 2020-05-21 RX ADMIN — ENOXAPARIN SODIUM 40 MG: 100 INJECTION SUBCUTANEOUS at 05:09

## 2020-05-21 RX ADMIN — KETOROLAC TROMETHAMINE 15 MG: 30 INJECTION, SOLUTION INTRAMUSCULAR at 05:09

## 2020-05-21 RX ADMIN — ACETAMINOPHEN 650 MG: 325 TABLET, FILM COATED ORAL at 05:09

## 2020-05-21 RX ADMIN — ONDANSETRON 4 MG: 2 INJECTION INTRAMUSCULAR; INTRAVENOUS at 05:09

## 2020-05-21 ASSESSMENT — ENCOUNTER SYMPTOMS
ROS GI COMMENTS: BM 5/20
COUGH: 0
SHORTNESS OF BREATH: 0
FEVER: 0
VOMITING: 0
ABDOMINAL PAIN: 0
MYALGIAS: 1
NAUSEA: 0

## 2020-05-21 NOTE — CARE PLAN
Problem: Bowel/Gastric:  Goal: Will not experience complications related to bowel motility  Outcome: PROGRESSING AS EXPECTED  Note: Patient tolerating diet, complains of nausea that is resolved with anti-emetics, discharged with script for zofran      Problem: Pain Management  Goal: Pain level will decrease to patient's comfort goal  Outcome: PROGRESSING AS EXPECTED  Note: Patient complains of mild pain, pain medication administered PRN, patient discharged with script for oxycodone

## 2020-05-21 NOTE — PROGRESS NOTES
Trauma / Surgical Daily Progress Note    Date of Service  5/21/2020    Chief Complaint  65 y.o. female admitted 5/18/2020 with CHRONIC DIVERTICULITIS LARGE INTESTINE    Interval Events  No acute interval events  Tolerating regular diet   Abdominal pain at incision, complaints of gas  Abdomen soft, midline incision well approximated  Ambulating independently  Medically cleared for discharge    Review of Systems  Review of Systems   Constitutional: Negative for fever.   Respiratory: Negative for cough and shortness of breath.    Gastrointestinal: Negative for abdominal pain, nausea and vomiting.        BM 5/20   Genitourinary:        Voiding   Musculoskeletal: Positive for myalgias.        Vital Signs  Temp:  [36.7 °C (98 °F)-37.4 °C (99.4 °F)] 36.8 °C (98.2 °F)  Pulse:  [85-98] 85  Resp:  [16-18] 16  BP: (103-118)/(63-72) 103/68  SpO2:  [90 %-97 %] 94 %    Physical Exam  Physical Exam  Vitals signs and nursing note reviewed.   Constitutional:       General: She is not in acute distress.  HENT:      Head: Normocephalic.      Nose: Nose normal.      Mouth/Throat:      Mouth: Mucous membranes are moist.   Eyes:      Pupils: Pupils are equal, round, and reactive to light.   Neck:      Musculoskeletal: Normal range of motion.   Cardiovascular:      Rate and Rhythm: Normal rate.      Pulses: Normal pulses.   Pulmonary:      Effort: Pulmonary effort is normal.   Abdominal:      General: Abdomen is flat. Bowel sounds are normal.      Palpations: Abdomen is soft.      Tenderness: There is abdominal tenderness (Left greater than right).      Comments: Midline incision approximated with staples   Musculoskeletal: Normal range of motion.   Skin:     General: Skin is warm and dry.   Neurological:      Mental Status: She is alert and oriented to person, place, and time.   Psychiatric:         Mood and Affect: Mood normal.         Behavior: Behavior normal.         Laboratory  Recent Results (from the past 24 hour(s))   Magnesium:  Every Monday and Thursday AM    Collection Time: 05/21/20  4:36 AM   Result Value Ref Range    Magnesium 2.0 1.5 - 2.5 mg/dL   Phosphorus: Every Monday and Thursday AM    Collection Time: 05/21/20  4:36 AM   Result Value Ref Range    Phosphorus 3.6 2.5 - 4.5 mg/dL   CBC WITH DIFFERENTIAL    Collection Time: 05/21/20  8:28 AM   Result Value Ref Range    WBC 13.6 (H) 4.8 - 10.8 K/uL    RBC 4.02 (L) 4.20 - 5.40 M/uL    Hemoglobin 12.3 12.0 - 16.0 g/dL    Hematocrit 37.6 37.0 - 47.0 %    MCV 93.5 81.4 - 97.8 fL    MCH 30.6 27.0 - 33.0 pg    MCHC 32.7 (L) 33.6 - 35.0 g/dL    RDW 41.7 35.9 - 50.0 fL    Platelet Count 210 164 - 446 K/uL    MPV 8.9 (L) 9.0 - 12.9 fL    Neutrophils-Polys 68.00 44.00 - 72.00 %    Lymphocytes 23.00 22.00 - 41.00 %    Monocytes 6.40 0.00 - 13.40 %    Eosinophils 1.80 0.00 - 6.90 %    Basophils 0.40 0.00 - 1.80 %    Immature Granulocytes 0.40 0.00 - 0.90 %    Nucleated RBC 0.00 /100 WBC    Neutrophils (Absolute) 9.26 (H) 2.00 - 7.15 K/uL    Lymphs (Absolute) 3.13 1.00 - 4.80 K/uL    Monos (Absolute) 0.87 (H) 0.00 - 0.85 K/uL    Eos (Absolute) 0.25 0.00 - 0.51 K/uL    Baso (Absolute) 0.05 0.00 - 0.12 K/uL    Immature Granulocytes (abs) 0.06 0.00 - 0.11 K/uL    NRBC (Absolute) 0.00 K/uL   Basic Metabolic Panel    Collection Time: 05/21/20  8:28 AM   Result Value Ref Range    Sodium 133 (L) 135 - 145 mmol/L    Potassium 3.8 3.6 - 5.5 mmol/L    Chloride 96 96 - 112 mmol/L    Co2 26 20 - 33 mmol/L    Glucose 125 (H) 65 - 99 mg/dL    Bun 5 (L) 8 - 22 mg/dL    Creatinine 0.56 0.50 - 1.40 mg/dL    Calcium 9.6 8.5 - 10.5 mg/dL    Anion Gap 11.0 7.0 - 16.0   ESTIMATED GFR    Collection Time: 05/21/20  8:28 AM   Result Value Ref Range    GFR If African American >60 >60 mL/min/1.73 m 2    GFR If Non African American >60 >60 mL/min/1.73 m 2       Fluids    Intake/Output Summary (Last 24 hours) at 5/21/2020 1207  Last data filed at 5/21/2020 0450  Gross per 24 hour   Intake 300 ml   Output --   Net 300 ml        Core Measures & Quality Metrics  Medications reviewed, Labs reviewed and Radiology images reviewed  Anand catheter: No Anand      DVT Prophylaxis: Enoxaparin (Lovenox)  DVT prophylaxis - mechanical: SCDs  Ulcer prophylaxis: Not indicated        DANE Score  ETOH Screening    Assessment/Plan  Diverticulitis- (present on admission)  Assessment & Plan  5/18 Open sigmoid colon resection with a low anterior anastomosis.  Arun Mathur MD. General Surgery        Discussed patient condition with RN, Patient and trauma surgery. Dr. Mathur

## 2020-05-21 NOTE — PROGRESS NOTES
Report received   Assumed care of patient.    Pt is A&O x4  Primary language Armenian,  used. Pt does speak and understand some English   Pain reported at 4/10. PRN oxycodone given  Nausea denies  Tolerating Regular Diet   Surgical midline incision with staples, DAVID   + Urine output  Last BM 5/20   + Flatus  Up self  SCD's off, ambulates frequently   Bed in lowest position and locked.  Pt resting comfortably now.  Review plan of care with patient  Call light within reach  Hourly rounds in place  All needs met at this time

## 2020-05-21 NOTE — DISCHARGE SUMMARY
General Surgery Discharge Summary    DATE OF ADMISSION: 5/18/2020    DATE OF DISCHARGE: 5/21/2020    LENGTH OF STAY: 3 days    ATTENDING PHYSICIAN: Arun Mathur M.D. general surgery    CONSULTING PHYSICIAN:   None    DISCHARGE DIAGNOSIS:  1.  Diverticulitis  2.  Type 2 diabetes mellitus without complications, without long-term current use of insulin    PROCEDURES:  1. Procedure completed by Dr. Mathur on 5/18/2020, open sigmoid colon resection with lower anterior anastomosis.    HISTORY OF PRESENT ILLNESS: The patient is a 65 y.o. female who presented to the operative suite for the above-noted procedure.    HOSPITAL COURSE: The patient was admitted to the critical care team under the direction and supervision of Dr. Mathur.  She sustained the listed diagnosis and incurred the listed procedure during her stay.    She was taken to the operating room for the above-noted open sigmoid colon resection with lower anterior anastomosis.    Postoperatively she was transferred to general surgical [facility].  She remained in the hospital awaiting return of GI function and pain control.    On the day of discharge the patient is up ambulating independently.  She is reporting adequate pain control.  Her midline incision is well approximated with staples and free of any sign of infection.  She is tolerating a regular diet, voiding, and having bowel movements as expected.  Her vital signs are stable with oxygen saturations greater than 92% on room air.  She is eager and feeling well enough for discharge home and is medically cleared to do such on this day.    DISCHARGE PHYSICAL EXAM: See epic physical exam dated 5/21/2020    DISCHARGE MEDICATIONS:  I reviewed the patients controlled substance history and obtained a controlled substance use informed consent (if applicable) provided by [facility] and the patient has been prescribed.       Medication List      START taking these medications      Instructions    ondansetron 4 MG Tbdp  Commonly known as:  ZOFRAN ODT   Take 1 Tab by mouth every 6 hours as needed for Nausea.  Dose:  4 mg     oxyCODONE immediate-release 5 MG Tabs  Commonly known as:  ROXICODONE   Take 1 Tab by mouth every 3 hours as needed for up to 7 days.  Dose:  5 mg        CHANGE how you take these medications      Instructions   acetaminophen 325 MG Tabs  What changed:    · medication strength  · how much to take  · reasons to take this  Commonly known as:  TYLENOL   Take 2 Tabs by mouth every 6 hours as needed.  Dose:  650 mg        CONTINUE taking these medications      Instructions   multivitamin Tabs   Take 1 Tab by mouth every day.  Dose:  1 Tab        STOP taking these medications    metroNIDAZOLE 500 MG Tabs  Commonly known as:  FLAGYL     neomycin 500 MG Tabs  Commonly known as:  MYCIFRADIN            DISPOSITION: The patient will be discharged home in stable condition on 5/21/2020.  She will follow up with Dr. Mathur in 1 week for wound check and staple removal.    The patient has been extensively counseled and all questions have been answered. Special attention was paid to respiratory decompensation, uncontrolled pain and signs and symptoms of infection and to seek immediate medical attention if these develop. The patient demonstrates understanding and gives verbal compliance with discharge instructions.    TIME SPENT ON DISCHARGE: 45 minutes        ____________________________________________  ROSEANNE Barrientos.    DD: 5/21/2020 12:40 PM

## 2020-05-21 NOTE — PROGRESS NOTES
Bedside report received.  Assessment complete.  A&O x 4. Patient calls appropriately.  Patient ambulates with no assist.    Patient has 4/10 pain. Pain managed with prescribed medications.   Denies N&V. Tolerating regular diet.  MLI with staples, approximated, DAVID.  + void, - flatus, - BM. Patient complaining of gas pain, reports pain is worse when eating.   Patient denies SOB.  SCD's off.    Review plan with of care with patient. Call light and personal belongings with in reach. Hourly rounding in place. All needs met at this time.

## 2020-05-21 NOTE — CARE PLAN
Problem: Communication  Goal: The ability to communicate needs accurately and effectively will improve  Outcome: PROGRESSING AS EXPECTED   Pt is primary Hungarian speaking,  used. Pt does speak and understand min English   Problem: Safety  Goal: Will remain free from injury  Outcome: PROGRESSING AS EXPECTED   Pt uses call light approprietly   Problem: Pain Management  Goal: Pain level will decrease to patient's comfort goal  Outcome: PROGRESSING AS EXPECTED   Pain managed with PRN oxycodone and scheduled tylenol and Toradol.

## 2020-05-21 NOTE — DISCHARGE INSTRUCTIONS
Discharge Instructions    Discharged to home by car with relative. Discharged via wheelchair, hospital escort: Yes.  Special equipment needed: Not Applicable    Be sure to schedule a follow-up appointment with your primary care doctor or any specialists as instructed.     Discharge Plan:        I understand that a diet low in cholesterol, fat, and sodium is recommended for good health. Unless I have been given specific instructions below for another diet, I accept this instruction as my diet prescription.       Special Instructions: None    · Is patient discharged on Warfarin / Coumadin?   No     Discharge instructions:   Call or seek medical attention for questions or concerns  - Follow up with Dr. Mathur in 1 weeks time for wound check and staple removal  - Follow up with primary care provider within one weeks time  - Resume regular diet  - May take over the counter acetaminophen or ibuprofen as needed for pain  - Continue daily over the counter stool softener while on narcotics  - No operation of machinery or motorized vehicles while under the influence of narcotics  - No alcohol, marijuana or illicit drug use while under the influence of narcotics  - No swimming, hot tubs, baths or wound submersion until cleared by outpatient provider. May shower  - No contact sports, strenuous activities, or heavy lifting until cleared by outpatient provider  - If respiratory decompensation, uncontrolled pain, or signs or symptoms of infection occur seek medical attention    Open Colectomy, Care After  Refer to this sheet in the next few weeks. These instructions provide you with information on caring for yourself after your procedure. Your health care provider may also give you more specific instructions. Your treatment has been planned according to current medical practices, but problems sometimes occur. Call your health care provider if you have any problems or questions after your procedure.  WHAT TO EXPECT AFTER THE  PROCEDURE  After your procedure, it is typical to have the following:  · Pain in your abdomen, especially along your incision. You will be given medicines to control the pain.  · Tiredness. This is a normal part of the recovery process. Your energy level will return to normal over the next several weeks.  · Constipation. You may be given a stool softener to prevent this.  HOME CARE INSTRUCTIONS  · Only take over-the-counter or prescription medicines as directed by your health care provider.  · Ask your health care provider whether you may take a shower when you go home.  · You may resume a normal diet and activities as directed. Eat plenty of fruits and vegetables to help prevent constipation.  · Drink enough fluids to keep your urine clear or pale yellow. This also helps prevent constipation.  · Take rest breaks during the day as needed.  · Avoid lifting anything heavier than 25 pounds (11.3 kg) or driving for 4 weeks or until your health care provider says it is okay.  · Follow up with your health care provider as directed. Ask your health care provider when you need to return to have your stitches or staples removed.  SEEK MEDICAL CARE IF:  · You have redness, swelling, or increasing pain in the incision area.  · You see pus coming from the incision area.  · You have a fever.  SEEK IMMEDIATE MEDICAL CARE IF:   · You have chest pain or shortness of breath.  · You have pain or swelling in your legs.  · You have persistent nausea and vomiting.  · Your wound breaks open after stitches or staples have been removed.  · You have increasing abdominal pain that is not relieved with medicine.     This information is not intended to replace advice given to you by your health care provider. Make sure you discuss any questions you have with your health care provider.     Document Released: 07/10/2012 Document Revised: 10/08/2014 Document Reviewed: 07/30/2014  Elsevier Interactive Patient Education ©2016 Elsevier  Inc[employer]    Colectomía abierta, cuidados posteriores  (Open Colectomy, Care After)  Siga estas instrucciones cody las próximas semanas. Estas indicaciones le proporcionan información general acerca de cómo deberá cuidarse después del procedimiento. El médico también podrá darle instrucciones más específicas. El tratamiento og sido planificado según las prácticas médicas actuales, waldemar en algunos casos pueden ocurrir problemas. Comuníquese con el médico si tiene algún problema o tiene dudas después del procedimiento.  QUÉ ESPERAR DESPUÉS DEL PROCEDIMIENTO  Después del procedimiento, es normal tener las siguientes sensaciones:  · Dolor en el abdomen, especialmente a lo penny de la incisión. Le darán analgésicos para controlar el dolor.  · Cansancio. Es lennox etapa normal del proceso de recuperación. Bernal nivel de energía volverá a la normalidad en las próximas semanas.  · Estreñimiento Probablemente le den laxantes para prevenir esto.  INSTRUCCIONES PARA EL CUIDADO EN EL HOGAR  · [place] solo medicamentos de venta marcela o recetados, según las indicaciones del médico.  · Pregúntele al médico si puede erica lennox ducha cuando regrese a bernal casa.  · Puede reanudar bernal dieta y amber actividades normales según se le haya indicado. Coma muchas frutas y verduras para evitar el estreñimiento.  · Alejandra suficiente líquido para mantener la orina abrahan o de color amarillo pálido. [place] también ayuda a prevenir el estreñimiento.  · Cody el día descanse cuanto sea necesario.  · No levante nada que pese más de 25 libras (11,3 kg) ni conduzca cody 4 semanas o hasta que el médico lo autorice.  · Concurra a las consultas de control con bernal médico según las indicaciones. Pregúntele al médico cuándo debe regresar para que le retiren los puntos o las grapas.  SOLICITE ATENCIÓN MÉDICA SI:  · La herida está nehemias, se hincha o aumenta el dolor.  · Tiene pus en el sitio de la incisión.  · Tiene fiebre.  SOLICITE ATENCIÓN MÉDICA DE INMEDIATO SI:    · Siente falta de aire o dolor en el pecho.  · Presenta dolor o hinchazón en las piernas.  · Tiene náuseas o vómitos persistentes.  · La herida se abre luego de que le hayan retirado los puntos o las grapas.  · Siente dolor intenso en el abdomen que no se melyssa con los medicamentos.     Esta información no tiene nereida fin reemplazar el consejo del médico. Asegúrese de hacerle al médico cualquier pregunta que tenga.     Document Released: 07/10/2012 Document Revised: 10/08/2014  [employer] Interactive Patient Education ©2016 [employer]    Depression / Suicide Risk    As you are discharged from this [facility] facility, it is important to learn how to keep safe from harming yourself.    Recognize the warning signs:  · Abrupt changes in personality, positive or negative- including increase in energy   · Giving away possessions  · Change in eating patterns- significant weight changes-  positive or negative  · Change in sleeping patterns- unable to sleep or sleeping all the time   · Unwillingness or inability to communicate  · Depression  · Unusual sadness, discouragement and loneliness  · Talk of wanting to die  · Neglect of personal appearance   · Rebelliousness- reckless behavior  · Withdrawal from people/activities they love  · Confusion- inability to concentrate     If you or a loved one observes any of these behaviors or has concerns about self-harm, here's what you can do:  · Talk about it- your feelings and reasons for harming yourself  · Remove any means that you might use to hurt yourself (examples: pills, rope, extension cords, firearm)  · Get professional help from the community (Mental Health, Substance Abuse, psychological counseling)  · Do not be alone:Call your Safe Contact- someone whom you trust who will be there for you.  · Call your local CRISIS HOTLINE 086-6991 or 074-015-7283  · Call your local Children's Mobile Crisis Response Team [place] (280) 967-7838 or www.Shoot it!  · Call  the toll free National Suicide Prevention Hotlines   · National Suicide Prevention Lifeline 120-766-EBTU (5952)  · National Hope Line Network 800-SUICIDE (847-3513)

## 2020-05-21 NOTE — PROGRESS NOTES
Patient discharged to home with family and staff escort. IV discontinued. Prescriptions given to patient, verbalized understanding, consent signed and in chart. Discharge instructions given regarding activity limitations, diet, incision care, follow up appointments, and when to seek medical attention.  Education provided, patient asked questions and verbalized understanding. Discharge paperwork signed and in chart. Patient is tolerating diet, stable when ambulating, and pain is well controlled. All belongings collected. No further questions or concerns at this time.    Discharge instructions provided by Icelandic speaking RN.

## 2021-02-15 ENCOUNTER — HOSPITAL ENCOUNTER (EMERGENCY)
Facility: MEDICAL CENTER | Age: 66
End: 2021-02-15
Attending: EMERGENCY MEDICINE
Payer: COMMERCIAL

## 2021-02-15 ENCOUNTER — APPOINTMENT (OUTPATIENT)
Dept: RADIOLOGY | Facility: MEDICAL CENTER | Age: 66
End: 2021-02-15
Attending: EMERGENCY MEDICINE
Payer: COMMERCIAL

## 2021-02-15 VITALS
SYSTOLIC BLOOD PRESSURE: 124 MMHG | DIASTOLIC BLOOD PRESSURE: 78 MMHG | WEIGHT: 164.02 LBS | RESPIRATION RATE: 14 BRPM | OXYGEN SATURATION: 96 % | TEMPERATURE: 98.1 F | BODY MASS INDEX: 28 KG/M2 | HEART RATE: 75 BPM | HEIGHT: 64 IN

## 2021-02-15 DIAGNOSIS — R10.32 LEFT LOWER QUADRANT ABDOMINAL PAIN: ICD-10-CM

## 2021-02-15 LAB
ALBUMIN SERPL BCP-MCNC: 4.6 G/DL (ref 3.2–4.9)
ALBUMIN/GLOB SERPL: 1.4 G/DL
ALP SERPL-CCNC: 91 U/L (ref 30–99)
ALT SERPL-CCNC: 21 U/L (ref 2–50)
ANION GAP SERPL CALC-SCNC: 12 MMOL/L (ref 7–16)
APPEARANCE UR: CLEAR
AST SERPL-CCNC: 19 U/L (ref 12–45)
BASOPHILS # BLD AUTO: 0.4 % (ref 0–1.8)
BASOPHILS # BLD: 0.04 K/UL (ref 0–0.12)
BILIRUB SERPL-MCNC: 0.5 MG/DL (ref 0.1–1.5)
BILIRUB UR QL STRIP.AUTO: NEGATIVE
BUN SERPL-MCNC: 13 MG/DL (ref 8–22)
CALCIUM SERPL-MCNC: 9.7 MG/DL (ref 8.5–10.5)
CHLORIDE SERPL-SCNC: 99 MMOL/L (ref 96–112)
CO2 SERPL-SCNC: 26 MMOL/L (ref 20–33)
COLOR UR: YELLOW
CREAT SERPL-MCNC: 0.81 MG/DL (ref 0.5–1.4)
EOSINOPHIL # BLD AUTO: 0.16 K/UL (ref 0–0.51)
EOSINOPHIL NFR BLD: 1.7 % (ref 0–6.9)
ERYTHROCYTE [DISTWIDTH] IN BLOOD BY AUTOMATED COUNT: 39.9 FL (ref 35.9–50)
GLOBULIN SER CALC-MCNC: 3.3 G/DL (ref 1.9–3.5)
GLUCOSE SERPL-MCNC: 119 MG/DL (ref 65–99)
GLUCOSE UR STRIP.AUTO-MCNC: NEGATIVE MG/DL
HCT VFR BLD AUTO: 45.3 % (ref 37–47)
HGB BLD-MCNC: 14.7 G/DL (ref 12–16)
IMM GRANULOCYTES # BLD AUTO: 0.02 K/UL (ref 0–0.11)
IMM GRANULOCYTES NFR BLD AUTO: 0.2 % (ref 0–0.9)
KETONES UR STRIP.AUTO-MCNC: NEGATIVE MG/DL
LEUKOCYTE ESTERASE UR QL STRIP.AUTO: NEGATIVE
LIPASE SERPL-CCNC: 35 U/L (ref 11–82)
LYMPHOCYTES # BLD AUTO: 3.03 K/UL (ref 1–4.8)
LYMPHOCYTES NFR BLD: 33.1 % (ref 22–41)
MCH RBC QN AUTO: 29.2 PG (ref 27–33)
MCHC RBC AUTO-ENTMCNC: 32.5 G/DL (ref 33.6–35)
MCV RBC AUTO: 90.1 FL (ref 81.4–97.8)
MICRO URNS: NORMAL
MONOCYTES # BLD AUTO: 0.55 K/UL (ref 0–0.85)
MONOCYTES NFR BLD AUTO: 6 % (ref 0–13.4)
NEUTROPHILS # BLD AUTO: 5.36 K/UL (ref 2–7.15)
NEUTROPHILS NFR BLD: 58.6 % (ref 44–72)
NITRITE UR QL STRIP.AUTO: NEGATIVE
NRBC # BLD AUTO: 0 K/UL
NRBC BLD-RTO: 0 /100 WBC
PH UR STRIP.AUTO: 7.5 [PH] (ref 5–8)
PLATELET # BLD AUTO: 278 K/UL (ref 164–446)
PMV BLD AUTO: 8.9 FL (ref 9–12.9)
POTASSIUM SERPL-SCNC: 4 MMOL/L (ref 3.6–5.5)
PROT SERPL-MCNC: 7.9 G/DL (ref 6–8.2)
PROT UR QL STRIP: NEGATIVE MG/DL
RBC # BLD AUTO: 5.03 M/UL (ref 4.2–5.4)
RBC UR QL AUTO: NEGATIVE
SODIUM SERPL-SCNC: 137 MMOL/L (ref 135–145)
SP GR UR STRIP.AUTO: 1.01
UROBILINOGEN UR STRIP.AUTO-MCNC: 0.2 MG/DL
WBC # BLD AUTO: 9.2 K/UL (ref 4.8–10.8)

## 2021-02-15 PROCEDURE — 74177 CT ABD & PELVIS W/CONTRAST: CPT

## 2021-02-15 PROCEDURE — 80053 COMPREHEN METABOLIC PANEL: CPT

## 2021-02-15 PROCEDURE — 83690 ASSAY OF LIPASE: CPT

## 2021-02-15 PROCEDURE — 81003 URINALYSIS AUTO W/O SCOPE: CPT

## 2021-02-15 PROCEDURE — 99284 EMERGENCY DEPT VISIT MOD MDM: CPT

## 2021-02-15 PROCEDURE — 700117 HCHG RX CONTRAST REV CODE 255: Performed by: EMERGENCY MEDICINE

## 2021-02-15 PROCEDURE — 85025 COMPLETE CBC W/AUTO DIFF WBC: CPT

## 2021-02-15 RX ADMIN — IOHEXOL 80 ML: 350 INJECTION, SOLUTION INTRAVENOUS at 16:52

## 2021-02-15 ASSESSMENT — LIFESTYLE VARIABLES
AVERAGE NUMBER OF DAYS PER WEEK YOU HAVE A DRINK CONTAINING ALCOHOL: 0
DOES PATIENT WANT TO STOP DRINKING: NO
TOTAL SCORE: 0
CONSUMPTION TOTAL: NEGATIVE
EVER HAD A DRINK FIRST THING IN THE MORNING TO STEADY YOUR NERVES TO GET RID OF A HANGOVER: NO
ON A TYPICAL DAY WHEN YOU DRINK ALCOHOL HOW MANY DRINKS DO YOU HAVE: 0
EVER FELT BAD OR GUILTY ABOUT YOUR DRINKING: NO
HAVE PEOPLE ANNOYED YOU BY CRITICIZING YOUR DRINKING: NO
HAVE YOU EVER FELT YOU SHOULD CUT DOWN ON YOUR DRINKING: NO
HOW MANY TIMES IN THE PAST YEAR HAVE YOU HAD 5 OR MORE DRINKS IN A DAY: 0
DO YOU DRINK ALCOHOL: NO

## 2021-02-15 ASSESSMENT — FIBROSIS 4 INDEX: FIB4 SCORE: 1.67

## 2021-02-15 NOTE — Clinical Note
Leti Arana was seen and treated in our emergency department on 2/15/2021.  She may return to work on 02/17/2021.       If you have any questions or concerns, please don't hesitate to call.      Debbie Villafana M.D.

## 2021-02-15 NOTE — ED TRIAGE NOTES
"Chief Complaint   Patient presents with   • Abdominal Pain     64 yo female ambulatory to triage for above complaint. Pt reports intermittent 8/10 burning L sided abdominal pain x 1 month, with increased intensity x 5D, recently finished course of Bactrim from PCP for possible UTI but states no improvement in pain. Hx Diverticulitis.    Educated on triage process, encourage to inform staff of any changes.     /75   Pulse 89   Temp 36.7 °C (98.1 °F) (Temporal)   Resp 16   Ht 1.626 m (5' 4\")   Wt 74.4 kg (164 lb 0.4 oz)   SpO2 97%   BMI 28.15 kg/m²   "

## 2021-02-15 NOTE — ED PROVIDER NOTES
"ED Provider Note    Scribed for Debbie Villafana M.D. by Rito Morales. 2/15/2021, 3:06 PM.    Primary care provider: Armond Quevedo M.D.  Means of arrival: Walk-in  History obtained from: Patient  History limited by: None    This patient was cared for during the COVID-19 pandemic. History and physical exam may be limited/truncated by the inherent challenges of PPE and the need to decrease staff exposure to novel coronavirus. Some aspects of disease management may be different to protect staff and help slow the spread of disease. I verified that, if possible, the patient was wearing a mask and I was wearing appropriate PPE every time I encountered the patient.     CHIEF COMPLAINT  Chief Complaint   Patient presents with   • Abdominal Pain       HPI  Leti Arana is a 65 y.o. female with history of diverticulitis and Diabetes mellitus type 2 who presents to the Emergency Department for intermittent 8/10 burning left sided abdominal pain onset 1 month ago. Patient states that the abdominal pain has been increasing in intensity for the past 5 days prompting her to visit the ED. She adds that she is experiencing a \"pressure like\" pain in her suprapubic area.  Patient has recent finished a course of Bactrim from her PCP for a possible UTI, but notes that she has experienced no pain alleviation. She adds that yesterday, she vomited after taking her Bactrim. Patient notes that her pain is exacerbated when she urinates. Patient denies any fevers or constipation and notes that her bowel movements have been normal. It is noted that the patient a partial colon removal with anastomosis on 5/18/2020. Patient's son notes that the patient believes the pain to have originated prior to the surgery. Patient's son denies any prior issues when the patient received a CAT scan.     REVIEW OF SYSTEMS  Pertinent positives include left sided abdominal pain, suprapubic pain, and one episode of vomiting. Pertinent negatives include " "no fevers, abnormal bowel movements, or constipation.  All other systems reviewed and negative.    PAST MEDICAL HISTORY   has a past medical history of Diabetes mellitus type II (10/17/2012) and Diverticulitis.    SURGICAL HISTORY   has a past surgical history that includes abdominal hysterectomy total; cholecystectomy (1990); and part removal colon w anastomosis (5/18/2020).    SOCIAL HISTORY  Social History     Tobacco Use   • Smoking status: Never Smoker   • Smokeless tobacco: Never Used   Substance Use Topics   • Alcohol use: No   • Drug use: No      Social History     Substance and Sexual Activity   Drug Use No       FAMILY HISTORY  Family History   Problem Relation Age of Onset   • Diabetes Neg Hx    • Hypertension Neg Hx    • Heart Disease Neg Hx    • Cancer Neg Hx        CURRENT MEDICATIONS  Current Outpatient Medications:   •  acetaminophen (TYLENOL) 325 MG Tab, Take 2 Tabs by mouth every 6 hours as needed., Disp: , Rfl:   •  ondansetron (ZOFRAN ODT) 4 MG TABLET DISPERSIBLE, Take 1 Tab by mouth every 6 hours as needed for Nausea., Disp: 10 Tab, Rfl: 0  •  multivitamin (THERAGRAN) Tab, Take 1 Tab by mouth every day., Disp: , Rfl:     ALLERGIES  No Known Allergies    PHYSICAL EXAM  VITAL SIGNS: /75   Pulse 89   Temp 36.7 °C (98.1 °F) (Temporal)   Resp 16   Ht 1.626 m (5' 4\")   Wt 74.4 kg (164 lb 0.4 oz)   SpO2 97%   BMI 28.15 kg/m²   Constitutional: Alert in no apparent distress.  HENT: No signs of trauma, Bilateral external ears normal, Nose normal.   Eyes: Pupils are equal and reactive, Conjunctiva normal, Non-icteric.   Neck: Normal range of motion, No tenderness, Supple, No stridor.   Cardiovascular: Regular rate and rhythm, no murmurs.   Thorax & Lungs: Normal breath sounds, No respiratory distress, No wheezing, No chest tenderness.   Abdomen: Left lower abdominal tenderness, suprapubic tenderness, no rebound or guarding. Bowel sounds normal, Soft, No masses, No peritoneal signs.  Skin: " "Warm, Dry, No erythema, No rash.   Musculoskeletal:  No major deformities noted.  Neurologic: Alert, moving all extremities without difficulty, no focal deficits.    LABS  Labs Reviewed   CBC WITH DIFFERENTIAL - Abnormal; Notable for the following components:       Result Value    MCHC 32.5 (*)     MPV 8.9 (*)     All other components within normal limits   COMP METABOLIC PANEL - Abnormal; Notable for the following components:    Glucose 119 (*)     All other components within normal limits   LIPASE   URINALYSIS    Narrative:     qns for extra   ESTIMATED GFR     All labs reviewed by me.    RADIOLOGY  CT-ABDOMEN-PELVIS WITH   Final Result      1.  There is no acute inflammatory process in the abdomen or pelvis.   2.  There is distal colonic diverticulosis with postoperative change in the sigmoid colon. There is no acute diverticulitis.   3.  Gallbladder surgically absent with stable probable physiologic prominence of the biliary tree.        The radiologist's interpretation of all radiological studies have been reviewed by me.    COURSE & MEDICAL DECISION MAKING  Pertinent Labs & Imaging studies reviewed. (See chart for details)    Differential diagnoses include but are not limited to: UTI, diverticulitis, and enteritis.     3:06 PM - Patient seen and examined at bedside. Ordered labs and imaging to evaluate her symptoms.     5:17 PM Patient was reevaluated at bedside. Discussed lab and radiology results with the patient and informed them that everything looks good. Discussed plan for discharge and follow-up. Patient is to return to the [facility] ED with any new or worsening symptoms. Patient was given the opportunity for questions. I addressed all questions or concerns at this time and they verbalize agreement to the plan of care. Review of vital signs at this visit show: /70   Pulse 80   Temp 36.7 °C (98.1 °F) (Temporal)   Resp 13   Ht 1.626 m (5' 4\")   Wt 74.4 kg (164 lb 0.4 oz)   SpO2 97%   BMI 28.15 " kg/m² .     Decision Making:  This is a 65 y.o. year old female who presents with left-sided abdominal pain.  She has had surgery about 8 or 9 months ago for diverticulitis.  Labs were obtained.  She has a normal white count without left shift.  Lipase is normal.  Electrolytes are all within normal limits.    CT was performed and there is no evidence of any inflammation in the abdomen or pelvis.  There is colonic diverticulosis without diverticulitis.  Her urinalysis is negative for infection.    At this point is unclear what is causing her abdominal pain.  However there is no evidence of intra-abdominal infection on labs or CT scan.  Therefore I did recommend that she follow-up with her primary care doctor and possibly even GI regarding this.  She is agreeable to this plan will be discharged.    The patient will return for new or worsening symptoms and is stable at the time of discharge. Patient was given return precautions. Patient and/or family member verbalizes understanding and will comply.    DISPOSITION:  Patient will be discharged home in stable condition.    FOLLOW UP:  Armond Quevedo M.D.  [address] [facility] [id number] [phone]          [facility], Emergency Dept  [address] [id number]  [phone]    Return for worsening pain, vomiting, fever or other concerns      OUTPATIENT MEDICATIONS:  Discharge Medication List as of 2/15/2021  5:31 PM          FINAL IMPRESSION  1. Left lower quadrant abdominal pain         This dictation has been created using voice recognition software and/or scribes. The accuracy of the dictation is limited by the abilities of the software and the expertise of the scribes. I expect there may be some errors of grammar and possibly content. I made every attempt to manually correct the errors within my dictation. However, errors related to voice recognition software and/or scribes may still exist and should be interpreted  within the appropriate context.     I, Rito Morales ([email or web address]), am scribing for, and in the presence of, Debbie Villafana M.D..    Electronically signed by: Rito Morales (Thais), 2/15/2021    IDebbie M.D. personally performed the services described in this documentation, as scribed by Rito Morales in my presence, and it is both accurate and complete. C    The note accurately reflects work and decisions made by me.  Debbie Villafana M.D.  2/15/2021  9:00 PM

## 2021-02-16 NOTE — ED NOTES
Patient brought back from triage, vital sign monitoring applied. Updated on plan of care,  utilized for assessments and all interactions

## 2021-02-16 NOTE — ED NOTES
Patient d/c off unit in stable condition, all questions and concerns addressed at this time. Education provided using teach back method.

## 2021-11-10 ENCOUNTER — OFFICE VISIT (OUTPATIENT)
Dept: URGENT CARE | Facility: PHYSICIAN GROUP | Age: 66
End: 2021-11-10

## 2021-11-10 DIAGNOSIS — Z01.10 ENCOUNTER FOR AUDIOLOGY EVALUATION: ICD-10-CM

## 2021-11-10 PROCEDURE — 8916 PR CLEARANCE ONLY: Performed by: PHYSICIAN ASSISTANT

## 2021-11-10 PROCEDURE — 92552 PURE TONE AUDIOMETRY AIR: CPT | Performed by: PHYSICIAN ASSISTANT

## 2021-11-10 NOTE — PROGRESS NOTES
Patient is here for audiometric testing only.  No change from her previous exam and findings are within normal limits.

## 2021-11-30 ENCOUNTER — OFFICE VISIT (OUTPATIENT)
Dept: URGENT CARE | Facility: PHYSICIAN GROUP | Age: 66
End: 2021-11-30
Payer: COMMERCIAL

## 2021-11-30 ENCOUNTER — HOSPITAL ENCOUNTER (OUTPATIENT)
Facility: MEDICAL CENTER | Age: 66
End: 2021-11-30
Attending: PHYSICIAN ASSISTANT
Payer: COMMERCIAL

## 2021-11-30 VITALS
HEART RATE: 95 BPM | HEIGHT: 63 IN | BODY MASS INDEX: 28.35 KG/M2 | WEIGHT: 160 LBS | SYSTOLIC BLOOD PRESSURE: 112 MMHG | DIASTOLIC BLOOD PRESSURE: 68 MMHG | RESPIRATION RATE: 16 BRPM | TEMPERATURE: 97.9 F | OXYGEN SATURATION: 93 %

## 2021-11-30 DIAGNOSIS — U07.1 COVID: ICD-10-CM

## 2021-11-30 DIAGNOSIS — R50.9 FEVER, UNSPECIFIED FEVER CAUSE: ICD-10-CM

## 2021-11-30 LAB
COVID ORDER STATUS COVID19: NORMAL
EXTERNAL QUALITY CONTROL: NORMAL
SARS-COV+SARS-COV-2 AG RESP QL IA.RAPID: POSITIVE

## 2021-11-30 PROCEDURE — U0003 INFECTIOUS AGENT DETECTION BY NUCLEIC ACID (DNA OR RNA); SEVERE ACUTE RESPIRATORY SYNDROME CORONAVIRUS 2 (SARS-COV-2) (CORONAVIRUS DISEASE [COVID-19]), AMPLIFIED PROBE TECHNIQUE, MAKING USE OF HIGH THROUGHPUT TECHNOLOGIES AS DESCRIBED BY CMS-2020-01-R: HCPCS

## 2021-11-30 PROCEDURE — 87426 SARSCOV CORONAVIRUS AG IA: CPT | Performed by: PHYSICIAN ASSISTANT

## 2021-11-30 PROCEDURE — U0005 INFEC AGEN DETEC AMPLI PROBE: HCPCS

## 2021-11-30 PROCEDURE — 99213 OFFICE O/P EST LOW 20 MIN: CPT | Mod: CS | Performed by: PHYSICIAN ASSISTANT

## 2021-11-30 ASSESSMENT — FIBROSIS 4 INDEX: FIB4 SCORE: 0.98

## 2021-11-30 NOTE — PROGRESS NOTES
Chief Complaint   Patient presents with   • Coronavirus Screening     loss of taste/smell, fever, cough       HISTORY OF PRESENT ILLNESS: Patient is a 66 y.o. female who presents today because she has a 2 to 3-day history of fever, cough, loss of taste and smell. She has not been taking any medication for her current symptoms. Denies any shortness of breath or chest pain    Patient Active Problem List    Diagnosis Date Noted   • Diverticulitis 2019   • Left lower quadrant pain 2019   • Obesity (BMI 30-39.9) 2017   • Type 2 diabetes mellitus without complication, without long-term current use of insulin ([facility]) 10/17/2012       Allergies:Patient has no known allergies.    Current Outpatient Medications Ordered in Epic   Medication Sig Dispense Refill   • acetaminophen (TYLENOL) 325 MG Tab Take 2 Tabs by mouth every 6 hours as needed.     • ondansetron (ZOFRAN ODT) 4 MG TABLET DISPERSIBLE Take 1 Tab by mouth every 6 hours as needed for Nausea. (Patient not taking: Reported on 2021) 10 Tab 0   • multivitamin (THERAGRAN) Tab Take 1 Tab by mouth every day. (Patient not taking: Reported on 2021)       No current [facility]-ordered facility-administered medications on file.       Past Medical History:   Diagnosis Date   • Diabetes mellitus type II 10/17/2012    Diet controlled/medications D/C   • Diverticulitis        Social History     Tobacco Use   • Smoking status: Never Smoker   • Smokeless tobacco: Never Used   Vaping Use   • Vaping Use: Never used   Substance Use Topics   • Alcohol use: No   • Drug use: No       Family Status   Relation Name Status   • Mo  Alive   • Fa     • Neg Hx  (Not Specified)     Family History   Problem Relation Age of Onset   • Diabetes Neg Hx    • Hypertension Neg Hx    • Heart Disease Neg Hx    • Cancer Neg Hx        ROS:  Review of Systems   Constitutional: Positive for fever, no chills, weight loss and positive for myalgias and malaise/fatigue.   HENT:  "Negative for ear pain, nosebleeds, congestion, sore throat and neck pain.    Eyes: Negative for blurred vision.   Respiratory: Positive for cough, no sputum production, shortness of breath and wheezing.    Cardiovascular: Negative for chest pain, palpitations, orthopnea and leg swelling.   Gastrointestinal: Negative for heartburn, nausea, vomiting and abdominal pain.   Genitourinary: Negative for dysuria, urgency and frequency.     Exam:  /68   Pulse 95   Temp 36.6 °C (97.9 °F) (Temporal)   Resp 16   Ht 1.6 m (5' 3\")   Wt 72.6 kg (160 lb)   SpO2 93%   General:  Well nourished, well developed female in NAD  Head:Normocephalic, atraumatic  Eyes: PERRLA, EOM within normal limits, no conjunctival injection, no scleral icterus, visual fields and acuity grossly intact.  Ears: Normal shape and symmetry, no tenderness, no discharge. External canals are without any significant edema or erythema. Tympanic membranes are without any inflammation, no effusion. Gross auditory acuity is intact  Nose: Symmetrical without tenderness, no discharge.  Mouth: reasonable hygiene, no erythema exudates or tonsillar enlargement.  Neck: no masses, range of motion within normal limits, no tracheal deviation. No obvious thyroid enlargement.  Pulmonary: chest is symmetrical with respiration, no wheezes, crackles, or rhonchi.  Cardiovascular: regular rate and rhythm without murmurs, rubs, or gallops.  Extremities: no clubbing, cyanosis, or edema.    Point-of-care Covid test is positive    Please note that this dictation was created using voice recognition software. I have made every reasonable attempt to correct obvious errors, but I expect that there are errors of grammar and possibly content that I did not discover before finalizing the note.    Assessment/Plan:  1. Fever, unspecified fever cause  POCT SARS-COV Antigen MARIFER (Symptomatic Only)    CANCELED: SARS-CoV-2 PCR (24 hour In-House): Collect NP swab in VTM   2. COVID   "   Discussed symptomatically, strict isolation.    Followup with primary care in the next 7-10 days if not significantly improving, return to the urgent care or go to the emergency room sooner for any worsening of symptoms.

## 2021-12-01 LAB
SARS-COV-2 RNA RESP QL NAA+PROBE: DETECTED
SPECIMEN SOURCE: ABNORMAL

## 2022-04-01 ENCOUNTER — APPOINTMENT (OUTPATIENT)
Dept: RADIOLOGY | Facility: MEDICAL CENTER | Age: 67
End: 2022-04-01
Attending: EMERGENCY MEDICINE
Payer: COMMERCIAL

## 2022-04-01 ENCOUNTER — HOSPITAL ENCOUNTER (OUTPATIENT)
Facility: MEDICAL CENTER | Age: 67
End: 2022-04-04
Attending: EMERGENCY MEDICINE | Admitting: STUDENT IN AN ORGANIZED HEALTH CARE EDUCATION/TRAINING PROGRAM
Payer: COMMERCIAL

## 2022-04-01 DIAGNOSIS — K56.600 PARTIAL SMALL BOWEL OBSTRUCTION (HCC): ICD-10-CM

## 2022-04-01 DIAGNOSIS — R10.9 ACUTE ABDOMINAL PAIN: ICD-10-CM

## 2022-04-01 DIAGNOSIS — Z87.19 HISTORY OF DIVERTICULITIS: ICD-10-CM

## 2022-04-01 LAB
ALBUMIN SERPL BCP-MCNC: 4.4 G/DL (ref 3.2–4.9)
ALBUMIN/GLOB SERPL: 1.5 G/DL
ALP SERPL-CCNC: 84 U/L (ref 30–99)
ALT SERPL-CCNC: 21 U/L (ref 2–50)
ANION GAP SERPL CALC-SCNC: 13 MMOL/L (ref 7–16)
APPEARANCE UR: CLEAR
AST SERPL-CCNC: 20 U/L (ref 12–45)
BACTERIA #/AREA URNS HPF: NEGATIVE /HPF
BASOPHILS # BLD AUTO: 0.3 % (ref 0–1.8)
BASOPHILS # BLD: 0.04 K/UL (ref 0–0.12)
BILIRUB SERPL-MCNC: 0.8 MG/DL (ref 0.1–1.5)
BILIRUB UR QL STRIP.AUTO: ABNORMAL
BUN SERPL-MCNC: 10 MG/DL (ref 8–22)
CALCIUM SERPL-MCNC: 9.1 MG/DL (ref 8.5–10.5)
CHLORIDE SERPL-SCNC: 101 MMOL/L (ref 96–112)
CO2 SERPL-SCNC: 23 MMOL/L (ref 20–33)
COLOR UR: ABNORMAL
CREAT SERPL-MCNC: 0.7 MG/DL (ref 0.5–1.4)
EOSINOPHIL # BLD AUTO: 0.12 K/UL (ref 0–0.51)
EOSINOPHIL NFR BLD: 1 % (ref 0–6.9)
EPI CELLS #/AREA URNS HPF: NEGATIVE /HPF
ERYTHROCYTE [DISTWIDTH] IN BLOOD BY AUTOMATED COUNT: 39.5 FL (ref 35.9–50)
GFR SERPLBLD CREATININE-BSD FMLA CKD-EPI: 95 ML/MIN/1.73 M 2
GLOBULIN SER CALC-MCNC: 3 G/DL (ref 1.9–3.5)
GLUCOSE SERPL-MCNC: 136 MG/DL (ref 65–99)
GLUCOSE UR STRIP.AUTO-MCNC: NEGATIVE MG/DL
HCT VFR BLD AUTO: 42.2 % (ref 37–47)
HGB BLD-MCNC: 14.2 G/DL (ref 12–16)
HYALINE CASTS #/AREA URNS LPF: ABNORMAL /LPF
IMM GRANULOCYTES # BLD AUTO: 0.05 K/UL (ref 0–0.11)
IMM GRANULOCYTES NFR BLD AUTO: 0.4 % (ref 0–0.9)
KETONES UR STRIP.AUTO-MCNC: ABNORMAL MG/DL
LEUKOCYTE ESTERASE UR QL STRIP.AUTO: ABNORMAL
LIPASE SERPL-CCNC: 19 U/L (ref 11–82)
LYMPHOCYTES # BLD AUTO: 3.15 K/UL (ref 1–4.8)
LYMPHOCYTES NFR BLD: 25.7 % (ref 22–41)
MCH RBC QN AUTO: 30.1 PG (ref 27–33)
MCHC RBC AUTO-ENTMCNC: 33.6 G/DL (ref 33.6–35)
MCV RBC AUTO: 89.4 FL (ref 81.4–97.8)
MICRO URNS: ABNORMAL
MONOCYTES # BLD AUTO: 0.8 K/UL (ref 0–0.85)
MONOCYTES NFR BLD AUTO: 6.5 % (ref 0–13.4)
NEUTROPHILS # BLD AUTO: 8.08 K/UL (ref 2–7.15)
NEUTROPHILS NFR BLD: 66.1 % (ref 44–72)
NITRITE UR QL STRIP.AUTO: POSITIVE
NRBC # BLD AUTO: 0 K/UL
NRBC BLD-RTO: 0 /100 WBC
PH UR STRIP.AUTO: 7 [PH] (ref 5–8)
PLATELET # BLD AUTO: 264 K/UL (ref 164–446)
PMV BLD AUTO: 8.8 FL (ref 9–12.9)
POTASSIUM SERPL-SCNC: 3.7 MMOL/L (ref 3.6–5.5)
PROT SERPL-MCNC: 7.4 G/DL (ref 6–8.2)
PROT UR QL STRIP: NEGATIVE MG/DL
RBC # BLD AUTO: 4.72 M/UL (ref 4.2–5.4)
RBC # URNS HPF: ABNORMAL /HPF
RBC UR QL AUTO: NEGATIVE
SODIUM SERPL-SCNC: 137 MMOL/L (ref 135–145)
SP GR UR STRIP.AUTO: 1.01
UROBILINOGEN UR STRIP.AUTO-MCNC: 1 MG/DL
WBC # BLD AUTO: 12.2 K/UL (ref 4.8–10.8)
WBC #/AREA URNS HPF: ABNORMAL /HPF

## 2022-04-01 PROCEDURE — 96374 THER/PROPH/DIAG INJ IV PUSH: CPT

## 2022-04-01 PROCEDURE — 700105 HCHG RX REV CODE 258: Performed by: EMERGENCY MEDICINE

## 2022-04-01 PROCEDURE — 83690 ASSAY OF LIPASE: CPT

## 2022-04-01 PROCEDURE — 36415 COLL VENOUS BLD VENIPUNCTURE: CPT

## 2022-04-01 PROCEDURE — 80053 COMPREHEN METABOLIC PANEL: CPT

## 2022-04-01 PROCEDURE — 74177 CT ABD & PELVIS W/CONTRAST: CPT

## 2022-04-01 PROCEDURE — 96375 TX/PRO/DX INJ NEW DRUG ADDON: CPT

## 2022-04-01 PROCEDURE — 81001 URINALYSIS AUTO W/SCOPE: CPT

## 2022-04-01 PROCEDURE — 99285 EMERGENCY DEPT VISIT HI MDM: CPT

## 2022-04-01 PROCEDURE — 85025 COMPLETE CBC W/AUTO DIFF WBC: CPT

## 2022-04-01 PROCEDURE — 700111 HCHG RX REV CODE 636 W/ 250 OVERRIDE (IP): Performed by: EMERGENCY MEDICINE

## 2022-04-01 RX ORDER — HYDROMORPHONE HYDROCHLORIDE 1 MG/ML
0.5 INJECTION, SOLUTION INTRAMUSCULAR; INTRAVENOUS; SUBCUTANEOUS ONCE
Status: COMPLETED | OUTPATIENT
Start: 2022-04-01 | End: 2022-04-01

## 2022-04-01 RX ORDER — SODIUM CHLORIDE, SODIUM LACTATE, POTASSIUM CHLORIDE, CALCIUM CHLORIDE 600; 310; 30; 20 MG/100ML; MG/100ML; MG/100ML; MG/100ML
1000 INJECTION, SOLUTION INTRAVENOUS ONCE
Status: COMPLETED | OUTPATIENT
Start: 2022-04-01 | End: 2022-04-02

## 2022-04-01 RX ORDER — ONDANSETRON 2 MG/ML
4 INJECTION INTRAMUSCULAR; INTRAVENOUS ONCE
Status: COMPLETED | OUTPATIENT
Start: 2022-04-01 | End: 2022-04-01

## 2022-04-01 RX ADMIN — FAMOTIDINE 20 MG: 10 INJECTION INTRAVENOUS at 23:07

## 2022-04-01 RX ADMIN — HYDROMORPHONE HYDROCHLORIDE 0.5 MG: 1 INJECTION, SOLUTION INTRAMUSCULAR; INTRAVENOUS; SUBCUTANEOUS at 23:07

## 2022-04-01 RX ADMIN — SODIUM CHLORIDE, POTASSIUM CHLORIDE, SODIUM LACTATE AND CALCIUM CHLORIDE 1000 ML: 600; 310; 30; 20 INJECTION, SOLUTION INTRAVENOUS at 23:11

## 2022-04-01 RX ADMIN — ONDANSETRON 4 MG: 2 INJECTION INTRAMUSCULAR; INTRAVENOUS at 23:07

## 2022-04-01 ASSESSMENT — FIBROSIS 4 INDEX: FIB4 SCORE: 0.98

## 2022-04-02 ENCOUNTER — APPOINTMENT (OUTPATIENT)
Dept: RADIOLOGY | Facility: MEDICAL CENTER | Age: 67
End: 2022-04-02
Attending: STUDENT IN AN ORGANIZED HEALTH CARE EDUCATION/TRAINING PROGRAM
Payer: COMMERCIAL

## 2022-04-02 PROBLEM — K56.600 PARTIAL BOWEL OBSTRUCTION (HCC): Status: ACTIVE | Noted: 2022-04-02

## 2022-04-02 PROBLEM — R82.71 ASYMPTOMATIC BACTERIURIA: Status: ACTIVE | Noted: 2022-04-02

## 2022-04-02 PROCEDURE — 96376 TX/PRO/DX INJ SAME DRUG ADON: CPT

## 2022-04-02 PROCEDURE — 96374 THER/PROPH/DIAG INJ IV PUSH: CPT

## 2022-04-02 PROCEDURE — 700111 HCHG RX REV CODE 636 W/ 250 OVERRIDE (IP): Performed by: STUDENT IN AN ORGANIZED HEALTH CARE EDUCATION/TRAINING PROGRAM

## 2022-04-02 PROCEDURE — 700117 HCHG RX CONTRAST REV CODE 255: Performed by: EMERGENCY MEDICINE

## 2022-04-02 PROCEDURE — 700105 HCHG RX REV CODE 258: Performed by: STUDENT IN AN ORGANIZED HEALTH CARE EDUCATION/TRAINING PROGRAM

## 2022-04-02 PROCEDURE — 96375 TX/PRO/DX INJ NEW DRUG ADDON: CPT

## 2022-04-02 PROCEDURE — 96372 THER/PROPH/DIAG INJ SC/IM: CPT

## 2022-04-02 PROCEDURE — 700102 HCHG RX REV CODE 250 W/ 637 OVERRIDE(OP): Performed by: STUDENT IN AN ORGANIZED HEALTH CARE EDUCATION/TRAINING PROGRAM

## 2022-04-02 PROCEDURE — G0378 HOSPITAL OBSERVATION PER HR: HCPCS

## 2022-04-02 PROCEDURE — A9270 NON-COVERED ITEM OR SERVICE: HCPCS | Performed by: STUDENT IN AN ORGANIZED HEALTH CARE EDUCATION/TRAINING PROGRAM

## 2022-04-02 PROCEDURE — 99220 PR INITIAL OBSERVATION CARE,LEVL III: CPT | Performed by: STUDENT IN AN ORGANIZED HEALTH CARE EDUCATION/TRAINING PROGRAM

## 2022-04-02 PROCEDURE — 700111 HCHG RX REV CODE 636 W/ 250 OVERRIDE (IP): Performed by: EMERGENCY MEDICINE

## 2022-04-02 RX ORDER — BISACODYL 10 MG
10 SUPPOSITORY, RECTAL RECTAL
Status: DISCONTINUED | OUTPATIENT
Start: 2022-04-02 | End: 2022-04-04 | Stop reason: HOSPADM

## 2022-04-02 RX ORDER — OXYCODONE HYDROCHLORIDE 5 MG/1
5 TABLET ORAL EVERY 4 HOURS PRN
Status: DISCONTINUED | OUTPATIENT
Start: 2022-04-02 | End: 2022-04-04 | Stop reason: HOSPADM

## 2022-04-02 RX ORDER — POLYETHYLENE GLYCOL 3350 17 G/17G
1 POWDER, FOR SOLUTION ORAL
Status: DISCONTINUED | OUTPATIENT
Start: 2022-04-02 | End: 2022-04-02

## 2022-04-02 RX ORDER — POLYETHYLENE GLYCOL 3350 17 G/17G
1 POWDER, FOR SOLUTION ORAL DAILY
Status: DISCONTINUED | OUTPATIENT
Start: 2022-04-02 | End: 2022-04-04 | Stop reason: HOSPADM

## 2022-04-02 RX ORDER — ONDANSETRON 2 MG/ML
4 INJECTION INTRAMUSCULAR; INTRAVENOUS EVERY 4 HOURS PRN
Status: DISCONTINUED | OUTPATIENT
Start: 2022-04-02 | End: 2022-04-04 | Stop reason: HOSPADM

## 2022-04-02 RX ORDER — METOCLOPRAMIDE HYDROCHLORIDE 5 MG/ML
10 INJECTION INTRAMUSCULAR; INTRAVENOUS EVERY 6 HOURS
Status: DISCONTINUED | OUTPATIENT
Start: 2022-04-02 | End: 2022-04-02

## 2022-04-02 RX ORDER — BISACODYL 10 MG
10 SUPPOSITORY, RECTAL RECTAL
Status: DISCONTINUED | OUTPATIENT
Start: 2022-04-02 | End: 2022-04-02

## 2022-04-02 RX ORDER — MORPHINE SULFATE 4 MG/ML
4 INJECTION INTRAVENOUS EVERY 4 HOURS PRN
Status: DISCONTINUED | OUTPATIENT
Start: 2022-04-02 | End: 2022-04-04 | Stop reason: HOSPADM

## 2022-04-02 RX ORDER — CEFTRIAXONE 2 G/1
2 INJECTION, POWDER, FOR SOLUTION INTRAMUSCULAR; INTRAVENOUS ONCE
Status: DISCONTINUED | OUTPATIENT
Start: 2022-04-02 | End: 2022-04-02

## 2022-04-02 RX ORDER — AMOXICILLIN 250 MG
2 CAPSULE ORAL 2 TIMES DAILY
Status: DISCONTINUED | OUTPATIENT
Start: 2022-04-02 | End: 2022-04-04 | Stop reason: HOSPADM

## 2022-04-02 RX ORDER — ONDANSETRON 4 MG/1
4 TABLET, ORALLY DISINTEGRATING ORAL EVERY 4 HOURS PRN
Status: DISCONTINUED | OUTPATIENT
Start: 2022-04-02 | End: 2022-04-04 | Stop reason: HOSPADM

## 2022-04-02 RX ORDER — AMOXICILLIN 250 MG
2 CAPSULE ORAL 2 TIMES DAILY
Status: DISCONTINUED | OUTPATIENT
Start: 2022-04-02 | End: 2022-04-02

## 2022-04-02 RX ORDER — HYDRALAZINE HYDROCHLORIDE 20 MG/ML
10 INJECTION INTRAMUSCULAR; INTRAVENOUS EVERY 4 HOURS PRN
Status: DISCONTINUED | OUTPATIENT
Start: 2022-04-02 | End: 2022-04-04 | Stop reason: HOSPADM

## 2022-04-02 RX ORDER — SODIUM CHLORIDE 9 MG/ML
INJECTION, SOLUTION INTRAVENOUS CONTINUOUS
Status: DISCONTINUED | OUTPATIENT
Start: 2022-04-02 | End: 2022-04-04 | Stop reason: HOSPADM

## 2022-04-02 RX ORDER — PROCHLORPERAZINE EDISYLATE 5 MG/ML
10 INJECTION INTRAMUSCULAR; INTRAVENOUS EVERY 6 HOURS PRN
Status: DISCONTINUED | OUTPATIENT
Start: 2022-04-02 | End: 2022-04-04 | Stop reason: HOSPADM

## 2022-04-02 RX ORDER — HYDROMORPHONE HYDROCHLORIDE 1 MG/ML
1 INJECTION, SOLUTION INTRAMUSCULAR; INTRAVENOUS; SUBCUTANEOUS ONCE
Status: COMPLETED | OUTPATIENT
Start: 2022-04-02 | End: 2022-04-02

## 2022-04-02 RX ORDER — ACETAMINOPHEN 325 MG/1
650 TABLET ORAL EVERY 6 HOURS PRN
Status: DISCONTINUED | OUTPATIENT
Start: 2022-04-02 | End: 2022-04-04 | Stop reason: HOSPADM

## 2022-04-02 RX ADMIN — POLYETHYLENE GLYCOL 3350 1 PACKET: 17 POWDER, FOR SOLUTION ORAL at 12:30

## 2022-04-02 RX ADMIN — PROCHLORPERAZINE EDISYLATE 10 MG: 5 INJECTION INTRAMUSCULAR; INTRAVENOUS at 09:40

## 2022-04-02 RX ADMIN — OXYCODONE HYDROCHLORIDE 5 MG: 5 TABLET ORAL at 05:11

## 2022-04-02 RX ADMIN — METOCLOPRAMIDE 10 MG: 5 INJECTION, SOLUTION INTRAMUSCULAR; INTRAVENOUS at 05:13

## 2022-04-02 RX ADMIN — ONDANSETRON 4 MG: 2 INJECTION INTRAMUSCULAR; INTRAVENOUS at 03:24

## 2022-04-02 RX ADMIN — OXYCODONE HYDROCHLORIDE 5 MG: 5 TABLET ORAL at 09:43

## 2022-04-02 RX ADMIN — ONDANSETRON 4 MG: 2 INJECTION INTRAMUSCULAR; INTRAVENOUS at 18:24

## 2022-04-02 RX ADMIN — SODIUM CHLORIDE: 9 INJECTION, SOLUTION INTRAVENOUS at 15:02

## 2022-04-02 RX ADMIN — ENOXAPARIN SODIUM 40 MG: 40 INJECTION SUBCUTANEOUS at 05:11

## 2022-04-02 RX ADMIN — HYDROMORPHONE HYDROCHLORIDE 1 MG: 1 INJECTION, SOLUTION INTRAMUSCULAR; INTRAVENOUS; SUBCUTANEOUS at 00:46

## 2022-04-02 RX ADMIN — SODIUM CHLORIDE: 9 INJECTION, SOLUTION INTRAVENOUS at 03:29

## 2022-04-02 RX ADMIN — SENNOSIDES AND DOCUSATE SODIUM 2 TABLET: 50; 8.6 TABLET ORAL at 05:11

## 2022-04-02 RX ADMIN — OXYCODONE HYDROCHLORIDE 5 MG: 5 TABLET ORAL at 15:02

## 2022-04-02 RX ADMIN — IOHEXOL 100 ML: 350 INJECTION, SOLUTION INTRAVENOUS at 00:15

## 2022-04-02 ASSESSMENT — ENCOUNTER SYMPTOMS
SORE THROAT: 0
DIARRHEA: 0
NAUSEA: 1
CONSTIPATION: 1
FLANK PAIN: 0
SHORTNESS OF BREATH: 0
VOMITING: 0
BLURRED VISION: 0
HEADACHES: 0
CHILLS: 0
COUGH: 0
DIZZINESS: 0
DEPRESSION: 0
MYALGIAS: 0
FEVER: 0
ABDOMINAL PAIN: 1
NERVOUS/ANXIOUS: 1

## 2022-04-02 ASSESSMENT — COGNITIVE AND FUNCTIONAL STATUS - GENERAL
SUGGESTED CMS G CODE MODIFIER DAILY ACTIVITY: CH
SUGGESTED CMS G CODE MODIFIER MOBILITY: CH
MOBILITY SCORE: 24
DAILY ACTIVITIY SCORE: 24

## 2022-04-02 ASSESSMENT — LIFESTYLE VARIABLES
HAVE YOU EVER FELT YOU SHOULD CUT DOWN ON YOUR DRINKING: NO
TOTAL SCORE: 0
HOW MANY TIMES IN THE PAST YEAR HAVE YOU HAD 5 OR MORE DRINKS IN A DAY: 0
TOTAL SCORE: 0
HAVE PEOPLE ANNOYED YOU BY CRITICIZING YOUR DRINKING: NO
EVER HAD A DRINK FIRST THING IN THE MORNING TO STEADY YOUR NERVES TO GET RID OF A HANGOVER: NO
ALCOHOL_USE: NO
EVER FELT BAD OR GUILTY ABOUT YOUR DRINKING: NO
AVERAGE NUMBER OF DAYS PER WEEK YOU HAVE A DRINK CONTAINING ALCOHOL: 0
TOTAL SCORE: 0
CONSUMPTION TOTAL: NEGATIVE
ON A TYPICAL DAY WHEN YOU DRINK ALCOHOL HOW MANY DRINKS DO YOU HAVE: 0
DOES PATIENT WANT TO STOP DRINKING: CANNOT ASSESS

## 2022-04-02 ASSESSMENT — PAIN DESCRIPTION - PAIN TYPE
TYPE: ACUTE PAIN

## 2022-04-02 ASSESSMENT — FIBROSIS 4 INDEX: FIB4 SCORE: 1.091089451179961906

## 2022-04-02 NOTE — PROGRESS NOTES
Hospital Medicine Daily Progress Note    Date of Service  4/2/2022    Chief Complaint  Leti Arana is a 66 y.o. female admitted 4/1/2022 with lower abdominal pain     Hospital Course  No notes on file     67 yo female with past medical history of Type II DM, history of diverticulitis s/p partial colectomy with reanastomosis in 2020 who presents to the hospital with lower abdominal pain. She had recently presented to outside urgent care with similar symptoms and was given antibiotics for urinary tract infection however to not have improvement thus presented here for further evaluation. Vitals were stable on admission. Labs remarkable for WBC 12.2 otherwise stable.   CT abdomen/pelvis done showing moderate stool burden with colon decompression at the level of rectal anastomosis consideration for partial obstruction.   Patient was admitted for further evaluation.     Interval Problem Update  Patient seen and examined, still with some mild nausea and lower abdominal pain, but does feel hungry. No emesis or BM.   - abdomen is soft and vitals are stable   - I did speak with gen surgery informally (no official consult) after discussion, will proceed wtih barium enema to evaluate for possible anastomotic stricture, pending results may need GI consultation.   - DC reglan pending barium study results, continue bowel regiment, clear liquid diet hold if any worsening symptoms/emesis     I have personally seen and examined the patient at bedside. I discussed the plan of care with patient and bedside RN.    Consultants/Specialty  NA    Code Status  Full Code    Disposition  Patient is not medically cleared for discharge.   Anticipate discharge to to home with close outpatient follow-up.  I have placed the appropriate orders for post-discharge needs.    Review of Systems  Review of Systems   Constitutional: Negative for fever.   HENT: Negative for sore throat.    Eyes: Negative for blurred vision.   Respiratory: Negative  for cough and shortness of breath.    Cardiovascular: Negative for chest pain and leg swelling.   Gastrointestinal: Positive for abdominal pain, constipation and nausea. Negative for vomiting.   Genitourinary: Negative for dysuria, flank pain, frequency and urgency.   Musculoskeletal: Negative for myalgias.   Neurological: Negative for dizziness and headaches.   Psychiatric/Behavioral: Negative for depression. The patient is nervous/anxious.         Physical Exam  Temp:  [36.3 °C (97.3 °F)-36.4 °C (97.5 °F)] 36.3 °C (97.3 °F)  Pulse:  [71-86] 71  Resp:  [12-22] 18  BP: (109-139)/(64-84) 109/66  SpO2:  [97 %-99 %] 98 %    Physical Exam  Constitutional:       Appearance: Normal appearance. She is obese. She is not ill-appearing or toxic-appearing.   HENT:      Head: Normocephalic and atraumatic.      Mouth/Throat:      Mouth: Mucous membranes are moist.      Pharynx: Oropharynx is clear.   Eyes:      Extraocular Movements: Extraocular movements intact.      Conjunctiva/sclera: Conjunctivae normal.   Cardiovascular:      Rate and Rhythm: Normal rate and regular rhythm.      Pulses: Normal pulses.   Pulmonary:      Effort: Pulmonary effort is normal.   Abdominal:      General: There is no distension.      Palpations: Abdomen is soft. There is no mass.      Tenderness: There is abdominal tenderness (LLQ tenderness ). There is no right CVA tenderness, left CVA tenderness, guarding or rebound.   Musculoskeletal:      Cervical back: Neck supple.      Right lower leg: No edema.      Left lower leg: No edema.   Skin:     General: Skin is warm.   Neurological:      General: No focal deficit present.      Mental Status: She is alert and oriented to person, place, and time.   Psychiatric:         Mood and Affect: Mood normal.         Behavior: Behavior normal.         Fluids  No intake or output data in the 24 hours ending 04/02/22 1100    Laboratory  Recent Labs     04/01/22  2239   WBC 12.2*   RBC 4.72   HEMOGLOBIN 14.2  "  HEMATOCRIT 42.2   MCV 89.4   MCH 30.1   MCHC 33.6   RDW 39.5   PLATELETCT 264   MPV 8.8*     Recent Labs     04/01/22  2239   SODIUM 137   POTASSIUM 3.7   CHLORIDE 101   CO2 23   GLUCOSE 136*   BUN 10   CREATININE 0.70   CALCIUM 9.1                   Imaging  CT-ABDOMEN-PELVIS WITH   Final Result         1.  Moderate stool within the colon with decompression at level of rectal anastomosis, consider component of partial obstruction due to anastomosis. Could be further evaluated with sigmoidoscopy.   2.  Mild intrahepatic and extra hepatic biliary ductal dilatation, commonly associated with postcholecystectomy physiology, consider causes of biliary obstruction with additional workup as clinically appropriate      DX-BARIUM ENEMA    (Results Pending)        Assessment/Plan  * Partial bowel obstruction (HCC)- (present on admission)  Assessment & Plan  Sudden onset abdominal pain since 4/1 associated with constipation  CT abdomen personally reviewed, shows \"Moderate stool within the colon with decompression at level of rectal anastomosis\", consistent with partial bowel obstruction due to sudden onset pain, constipation, nausea, history of multiple abdominal surgeries  Bowel regiment   CLD   Serial abdominal exams  May need NG tube if no improvment    Asymptomatic bacteriuria- (present on admission)  Assessment & Plan  UA is nitrate positive, moderate leukocyte esterase, negative bacteria  Comes in with lower abdominal pain but denies any urinary symptoms at all, no fever/chills, no flank pain  CT abdomen consistent with partial bowel obstruction.  Kidneys appear normal with no hydronephrosis  She received 1 dose antibiotics at outside facility, will not continue with symptoms likely not UTI  Monitor     Left lower quadrant abdominal pain- (present on admission)  Assessment & Plan  Sudden onset pain since 4/1, no fever/chills, no urinary symptoms  Secondary to partial bowel obstruction due to imaging and " presentation  If development of emesis or worsening symptoms will place NGT for decompression   Barium enema ordered to evaluate for anastomotic stricture, may need GI consultation pending results    Clear liquid diet as tolerated   Supportive care   Bowel regiment        VTE prophylaxis: SCDs/TEDs and enoxaparin ppx    I have performed a physical exam and reviewed and updated ROS and Plan today (4/2/2022). In review of yesterday's note (4/1/2022), there are no changes except as documented above.

## 2022-04-02 NOTE — H&P
Hospital Medicine History & Physical Note    Date of Service  4/2/2022    Primary Care Physician  Armond Quevedo M.D.    Code Status  Full Code    Chief Complaint  Chief Complaint   Patient presents with   • LLQ Pain     Pt reports pain in the LUQ and LLQ of abdomen starting at 3 am. Pt describes pain as sharp and a 9/10. Pt reports she went to [facility] in [place] earlier today where she was prescribed abx for a UTI. Pt denies radiation of pain to the L flank region. Pt reports pain has been worsening since she left the hospital earlier so her daughter called EMS. Pt was given 100 mcg of fentanyl and 4 mg of Zofran by EMS. Hx of diverticulitis.   • N/V     Starting at 3am       History of Presenting Illness  Leti Arana is a 66 y.o. female who presented 4/1/2022 with abdominal pain.  PMH of diverticulitis s/p bowel resection with anastomosis about 2 years ago, prior cholecystectomy and hysterectomy.  Comes in complaining of worsening lower abdominal pain with constipation.  Symptoms started around 3 AM on 4/1 and woke her up from sleep, was in normal state of health before.  Pain located in the lower abdomen, mostly on the left, does not radiate to the back or flanks.  Associated with constipation.  Denies any urinary symptoms whatsoever, no fever/chills or any other acute complaints.  She went to an outside ED in [place], diagnosed with UTI due to asymptomatic bacteriuria, given pain meds and antibiotics and discharged.  Comes back in due to continued pain.    I discussed the plan of care with patient, bedside RN and EDP.    Review of Systems  Review of Systems   Constitutional: Negative for chills and fever.   HENT: Negative for sore throat.    Respiratory: Negative for cough and shortness of breath.    Cardiovascular: Negative for chest pain.   Gastrointestinal: Positive for abdominal pain, constipation and nausea. Negative for diarrhea and vomiting.   Genitourinary: Negative for dysuria, flank pain,  frequency, hematuria and urgency.   Neurological: Negative for dizziness and headaches.   All other systems reviewed and are negative.      Past Medical History   has a past medical history of Diabetes mellitus type II (10/17/2012) and Diverticulitis.    Surgical History   has a past surgical history that includes abdominal hysterectomy total; cholecystectomy (1990); and pr part removal colon w anastomosis (5/18/2020).     Family History  family history is not on file.   Family history reviewed with patient. There is no family history that is pertinent to the chief complaint.     Social History   reports that she has never smoked. She has never used smokeless tobacco. She reports that she does not drink alcohol and does not use drugs.    Allergies  No Known Allergies    Medications  Prior to Admission Medications   Prescriptions Last Dose Informant Patient Reported? Taking?   acetaminophen (TYLENOL) 325 MG Tab   Yes No   Sig: Take 2 Tabs by mouth every 6 hours as needed.      Facility-Administered Medications: None       Physical Exam  Temp:  [36.4 °C (97.5 °F)] 36.4 °C (97.5 °F)  Pulse:  [80-86] 80  Resp:  [18-22] 22  BP: (120-139)/(69-84) 138/71  SpO2:  [97 %-98 %] 98 %  Blood Pressure : 138/71   Temperature: 36.4 °C (97.5 °F)   Pulse: 80   Respiration: (!) 22   Pulse Oximetry: 98 %       Physical Exam  Constitutional:       Appearance: Normal appearance.   HENT:      Head: Normocephalic and atraumatic.      Mouth/Throat:      Mouth: Mucous membranes are moist.      Pharynx: No oropharyngeal exudate or posterior oropharyngeal erythema.   Eyes:      General: No scleral icterus.  Cardiovascular:      Rate and Rhythm: Normal rate and regular rhythm.      Pulses: Normal pulses.      Heart sounds: Normal heart sounds. No murmur heard.  Pulmonary:      Effort: Pulmonary effort is normal. No respiratory distress.      Breath sounds: Normal breath sounds.   Abdominal:      General: Bowel sounds are normal. There is no  distension.      Palpations: Abdomen is soft.      Tenderness: There is abdominal tenderness. There is no guarding or rebound.      Comments: Lower abdominal pain tenderness to palpation, mostly on the left   Musculoskeletal:         General: No swelling or tenderness. Normal range of motion.      Cervical back: Normal range of motion and neck supple.   Skin:     General: Skin is warm and dry.   Neurological:      General: No focal deficit present.      Mental Status: She is alert and oriented to person, place, and time.   Psychiatric:         Mood and Affect: Mood normal.         Laboratory:  Recent Labs     04/01/22 2239   WBC 12.2*   RBC 4.72   HEMOGLOBIN 14.2   HEMATOCRIT 42.2   MCV 89.4   MCH 30.1   MCHC 33.6   RDW 39.5   PLATELETCT 264   MPV 8.8*     Recent Labs     04/01/22 2239   SODIUM 137   POTASSIUM 3.7   CHLORIDE 101   CO2 23   GLUCOSE 136*   BUN 10   CREATININE 0.70   CALCIUM 9.1     Recent Labs     04/01/22 2239   ALTSGPT 21   ASTSGOT 20   ALKPHOSPHAT 84   TBILIRUBIN 0.8   LIPASE 19   GLUCOSE 136*         No results for input(s): NTPROBNP in the last 72 hours.      No results for input(s): TROPONINT in the last 72 hours.    Imaging:  CT-ABDOMEN-PELVIS WITH   Final Result         1.  Moderate stool within the colon with decompression at level of rectal anastomosis, consider component of partial obstruction due to anastomosis. Could be further evaluated with sigmoidoscopy.   2.  Mild intrahepatic and extra hepatic biliary ductal dilatation, commonly associated with postcholecystectomy physiology, consider causes of biliary obstruction with additional workup as clinically appropriate          no X-Ray or EKG requiring interpretation    Assessment/Plan:  I anticipate this patient is appropriate for observation status at this time.    * Partial bowel obstruction (HCC)- (present on admission)  Assessment & Plan  Sudden onset abdominal pain since 4/1 associated with constipation  CT abdomen personally  "reviewed, shows \"Moderate stool within the colon with decompression at level of rectal anastomosis\", consistent with partial bowel obstruction due to sudden onset pain, constipation, nausea, history of multiple abdominal surgeries    N.p.o., advance diet as tolerated.  Pain management, scheduled laxatives, IV fluids  Has nausea but no vomiting, if she starts to vomit will place NG tube    Asymptomatic bacteriuria- (present on admission)  Assessment & Plan  UA is nitrate positive, moderate leukocyte esterase, negative bacteria  Comes in with lower abdominal pain but denies any urinary symptoms at all, no fever/chills, no flank pain  CT abdomen consistent with partial bowel obstruction.  Kidneys appear normal with no hydronephrosis  She received 1 dose antibiotics at outside facility, will not continue with symptoms likely not UTI    Left lower quadrant abdominal pain- (present on admission)  Assessment & Plan  Sudden onset pain since 4/1, no fever/chills, no urinary symptoms  Secondary to partial bowel obstruction due to imaging and presentation.  Has had 3 prior abdominal surgeries  Asymptomatic bacteriuria, no UTI  Dilated IBD per CT abdomen, most likely due to postcholecystectomy changes.  LFTs unremarkable      VTE prophylaxis: enoxaparin ppx  "

## 2022-04-02 NOTE — ASSESSMENT & PLAN NOTE
Sudden onset pain since 4/1, no fever/chills, no urinary symptoms  Secondary to partial bowel obstruction due to imaging and presentation  If development of emesis or worsening symptoms will place NGT for decompression   Barium enema ordered to evaluate for anastomotic stricture pending   Clear liquid diet as tolerated   Supportive care   Bowel regiment   Improving with BM  Will reach out to Dr. Mathur today

## 2022-04-02 NOTE — ED TRIAGE NOTES
"Chief Complaint   Patient presents with   • LLQ Pain     Pt reports pain in the LUQ and LLQ of abdomen starting at 3 am. Pt describes pain as sharp and a 9/10. Pt reports she went to [facility] in [place] earlier today where she was prescribed abx for a UTI. Pt denies radiation of pain to the L flank region. Pt reports pain has been worsening since she left the hospital earlier so her daughter called EMS. Pt was given 100 mcg of fentanyl and 4 mg of Zofran by EMS. Hx of diverticulitis.   • N/V     Starting at 3am       67 yo female to triage for above complaint.  [id number] used to complete triage. Pt denies bloody stools. GCS=15. Protocol ordered.    Pt is alert and oriented, speaking in full sentences, follows commands and responds appropriately to questions. NAD. Resp are even and unlabored.      Pt placed in lobby. Pt educated on triage process. Pt encouraged to alert staff for any changes.     Patient and staff wearing appropriate PPE    /69   Pulse 86   Temp 36.4 °C (97.5 °F) (Temporal)   Resp 18   Ht 1.6 m (5' 2.99\")   Wt 72.6 kg (160 lb)   SpO2 97% Comment: Room air  BMI 28.35 kg/m²     "

## 2022-04-02 NOTE — ED NOTES
Med rec completed per pt at bedside via  (#[id number])  Allergies reviewed - NKDA  No oral ABX in last 30 days. Pt states she was prescribed ABX yesterday but did not take them     Pt denies taking any OTC or RX medications routinely, but did take milk of magnesia yesterday for constipation

## 2022-04-02 NOTE — ED PROVIDER NOTES
ED Provider Note    CHIEF COMPLAINT  Chief Complaint   Patient presents with   • LLQ Pain     Pt reports pain in the LUQ and LLQ of abdomen starting at 3 am. Pt describes pain as sharp and a 9/10. Pt reports she went to [facility] in [place] earlier today where she was prescribed abx for a UTI. Pt denies radiation of pain to the L flank region. Pt reports pain has been worsening since she left the hospital earlier so her daughter called EMS. Pt was given 100 mcg of fentanyl and 4 mg of Zofran by EMS. Hx of diverticulitis.   • N/V     Starting at 3am       HPI    Primary care provider: Armond Quevedo M.D.  Means of arrival: EMS  History obtained from: Patient, daughter  History limited by: Nothing    Leti Arana is a 66 y.o. female who presents with left-sided abdominal pain.  Started at 3 AM.  No falls or injuries or trauma.  Sharp and severe nonradiating pain, 9 out of 10.  Constant and worsening since onset.  No flank pain or urinary symptoms.  No black or bloody output.  Patient was transported here, diagnosed with a UTI recently, but pain worsened so daughter called 911.  In route the patient was given 100 mcg of fentanyl and 4 mg of Zofran, and her symptoms are slightly improved since those interventions.  No known close COVID contacts.    REVIEW OF SYSTEMS  Constitutional: Negative for fever or chills.   HENT: Negative for rhinorrhea or sore throat.    Respiratory: Negative for cough or shortness of breath.    Cardiovascular: Negative for chest pain or palpitations.   Gastrointestinal: Positive for nausea and left-sided abdominal pain, no vomiting or diarrhea or black or bloody output.  Genitourinary: Negative for dysuria or flank pain.   Musculoskeletal: Negative for back pain or joint pain.   Skin: Negative for itching or rash.   Neurological: Negative for sensory or motor changes.   See HPI for further details. All other systems are negative.     PAST MEDICAL HISTORY   has a past medical history of  "Diabetes mellitus type II (10/17/2012) and Diverticulitis.    PAST FAMILY HISTORY  Family History   Problem Relation Age of Onset   • Diabetes Neg Hx    • Hypertension Neg Hx    • Heart Disease Neg Hx    • Cancer Neg Hx        SOCIAL HISTORY  Social History     Tobacco Use   • Smoking status: Never Smoker   • Smokeless tobacco: Never Used   Vaping Use   • Vaping Use: Never used   Substance and Sexual Activity   • Alcohol use: No   • Drug use: No   • Sexual activity: Not on file       SURGICAL HISTORY   has a past surgical history that includes abdominal hysterectomy total; cholecystectomy (1990); and part removal colon w anastomosis (5/18/2020).    CURRENT MEDICATIONS  Home Medications     Reviewed by Erik Nickerson (Pharmacy Intern) on 04/02/22 at 0937  Med List Status: Complete   Medication Last Dose Status   magnesium hydroxide (MILK OF MAGNESIA) 400 MG/5ML Suspension 4/1/2022 Active                ALLERGIES  No Known Allergies    PHYSICAL EXAM  VITAL SIGNS: /86   Pulse 69   Temp 36.5 °C (97.7 °F) (Temporal)   Resp 16   Ht 1.6 m (5' 2.99\")   Wt 73.4 kg (161 lb 13.1 oz)   SpO2 92%   BMI 28.67 kg/m²    Pulse ox interpretation: On room air, I interpret this pulse ox as normal.  Constitutional: Lying on the stretcher in mild distress.  HEENT: Normocephalic, atraumatic. Posterior pharynx clear, mucous membranes dry.  Eyes:  EOMI. Normal sclerae.  Neck: Supple, nontender.  Chest/Pulmonary: Clear to ausculation bilaterally, no wheezes or rhonchi.  Cardiovascular: Regular rate and rhythm, no murmur.   Abdomen: Soft, focally tender to the left lower quadrant; no masses.    Back: No CVA or midline tenderness.   Musculoskeletal: No deformity or edema.  Neuro: Clear speech, alert, no focal weakness or asymmetry.  Psych: Normal mood and affect.  Skin: No rashes, warm and dry.      DIAGNOSTIC STUDIES / PROCEDURES    LABS & EKG  Results for orders placed or performed during the hospital encounter of " 04/01/22   CBC WITH DIFFERENTIAL   Result Value Ref Range    WBC 12.2 (H) 4.8 - 10.8 K/uL    RBC 4.72 4.20 - 5.40 M/uL    Hemoglobin 14.2 12.0 - 16.0 g/dL    Hematocrit 42.2 37.0 - 47.0 %    MCV 89.4 81.4 - 97.8 fL    MCH 30.1 27.0 - 33.0 pg    MCHC 33.6 33.6 - 35.0 g/dL    RDW 39.5 35.9 - 50.0 fL    Platelet Count 264 164 - 446 K/uL    MPV 8.8 (L) 9.0 - 12.9 fL    Neutrophils-Polys 66.10 44.00 - 72.00 %    Lymphocytes 25.70 22.00 - 41.00 %    Monocytes 6.50 0.00 - 13.40 %    Eosinophils 1.00 0.00 - 6.90 %    Basophils 0.30 0.00 - 1.80 %    Immature Granulocytes 0.40 0.00 - 0.90 %    Nucleated RBC 0.00 /100 WBC    Neutrophils (Absolute) 8.08 (H) 2.00 - 7.15 K/uL    Lymphs (Absolute) 3.15 1.00 - 4.80 K/uL    Monos (Absolute) 0.80 0.00 - 0.85 K/uL    Eos (Absolute) 0.12 0.00 - 0.51 K/uL    Baso (Absolute) 0.04 0.00 - 0.12 K/uL    Immature Granulocytes (abs) 0.05 0.00 - 0.11 K/uL    NRBC (Absolute) 0.00 K/uL   COMP METABOLIC PANEL   Result Value Ref Range    Sodium 137 135 - 145 mmol/L    Potassium 3.7 3.6 - 5.5 mmol/L    Chloride 101 96 - 112 mmol/L    Co2 23 20 - 33 mmol/L    Anion Gap 13.0 7.0 - 16.0    Glucose 136 (H) 65 - 99 mg/dL    Bun 10 8 - 22 mg/dL    Creatinine 0.70 0.50 - 1.40 mg/dL    Calcium 9.1 8.5 - 10.5 mg/dL    AST(SGOT) 20 12 - 45 U/L    ALT(SGPT) 21 2 - 50 U/L    Alkaline Phosphatase 84 30 - 99 U/L    Total Bilirubin 0.8 0.1 - 1.5 mg/dL    Albumin 4.4 3.2 - 4.9 g/dL    Total Protein 7.4 6.0 - 8.2 g/dL    Globulin 3.0 1.9 - 3.5 g/dL    A-G Ratio 1.5 g/dL   LIPASE   Result Value Ref Range    Lipase 19 11 - 82 U/L   URINALYSIS    Specimen: Urine, Clean Catch; Blood   Result Value Ref Range    Color DK Yellow     Character Clear     Specific Gravity 1.010 <1.035    Ph 7.0 5.0 - 8.0    Glucose Negative Negative mg/dL    Ketones Trace (A) Negative mg/dL    Protein Negative Negative mg/dL    Bilirubin Small (A) Negative    Urobilinogen, Urine 1.0 Negative    Nitrite Positive (A) Negative    Leukocyte  Esterase Moderate (A) Negative    Occult Blood Negative Negative    Micro Urine Req Microscopic    URINE MICROSCOPIC (W/UA)   Result Value Ref Range    WBC 5-10 (A) /hpf    RBC 0-2 /hpf    Bacteria Negative None /hpf    Epithelial Cells Negative /hpf    Hyaline Cast 0-2 /lpf   ESTIMATED GFR   Result Value Ref Range    GFR (CKD-EPI) 95 >60 mL/min/1.73 m 2   Comp Metabolic Panel (CMP)   Result Value Ref Range    Sodium 142 135 - 145 mmol/L    Potassium 3.6 3.6 - 5.5 mmol/L    Chloride 108 96 - 112 mmol/L    Co2 27 20 - 33 mmol/L    Anion Gap 7.0 7.0 - 16.0    Glucose 105 (H) 65 - 99 mg/dL    Bun 5 (L) 8 - 22 mg/dL    Creatinine 0.63 0.50 - 1.40 mg/dL    Calcium 8.4 (L) 8.5 - 10.5 mg/dL    AST(SGOT) 23 12 - 45 U/L    ALT(SGPT) 29 2 - 50 U/L    Alkaline Phosphatase 77 30 - 99 U/L    Total Bilirubin 0.7 0.1 - 1.5 mg/dL    Albumin 3.4 3.2 - 4.9 g/dL    Total Protein 5.9 (L) 6.0 - 8.2 g/dL    Globulin 2.5 1.9 - 3.5 g/dL    A-G Ratio 1.4 g/dL   CBC without Differential   Result Value Ref Range    WBC 8.5 4.8 - 10.8 K/uL    RBC 4.09 (L) 4.20 - 5.40 M/uL    Hemoglobin 12.1 12.0 - 16.0 g/dL    Hematocrit 37.2 37.0 - 47.0 %    MCV 91.0 81.4 - 97.8 fL    MCH 29.6 27.0 - 33.0 pg    MCHC 32.5 (L) 33.6 - 35.0 g/dL    RDW 40.7 35.9 - 50.0 fL    Platelet Count 190 164 - 446 K/uL    MPV 8.7 (L) 9.0 - 12.9 fL   ESTIMATED GFR   Result Value Ref Range    GFR (CKD-EPI) 97 >60 mL/min/1.73 m 2       RADIOLOGY  DX-BARIUM ENEMA   Final Result      1.  Mild narrowing at the anastomosis in the rectosigmoid colon without evidence of obstruction.      2.  Diverticulosis.      CT-ABDOMEN-PELVIS WITH   Final Result         1.  Moderate stool within the colon with decompression at level of rectal anastomosis, consider component of partial obstruction due to anastomosis. Could be further evaluated with sigmoidoscopy.   2.  Mild intrahepatic and extra hepatic biliary ductal dilatation, commonly associated with postcholecystectomy physiology, consider  causes of biliary obstruction with additional workup as clinically appropriate          COURSE & MEDICAL DECISION MAKING    This is a 66 y.o. female who presents with left-sided abdominal pain.    Differential Diagnosis includes but is not limited to:  Obstruction, diverticulitis, constipation, IBD, IBS    ED Course:  66-year-old female with above concerning presentation.  Given age and concerning for left-sided abdominal pain plan advanced imaging and labs.    Imaging concerning for potential partial obstruction.  Patient required several doses of pain medicines given age I think she should be observed in the hospital, discussed with hospitalist who will kindly assume care of the patient she is hemodynamically stable for hospitalization in guarded condition.    Medications   HYDROmorphone (Dilaudid) injection 0.5 mg (0.5 mg Intravenous Given 4/1/22 2307)   lactated ringers (LR) bolus (0 mL Intravenous Stopped 4/2/22 0320)   ondansetron (ZOFRAN) syringe/vial injection 4 mg (4 mg Intravenous Given 4/1/22 2307)   famotidine (PEPCID) injection 20 mg (20 mg Intravenous Given 4/1/22 2307)   iohexol (OMNIPAQUE) 350 mg/mL (IV) (100 mL Intravenous Given 4/2/22 0015)   HYDROmorphone (Dilaudid) injection 1 mg (1 mg Intravenous Given 4/2/22 0046)   iohexol (OMNIPAQUE) 350 mg/mL (800 mL Rectal Given 4/3/22 0830)       FINAL IMPRESSION  1. Acute abdominal pain    2. History of diverticulitis    3. Partial small bowel obstruction (HCC)        -ADMIT-      Pertinent Labs & Imaging studies reviewed and verified by myself, as well as nursing notes and the patient's past medical, family, and social histories (See chart for details).    Portions of this record were made with voice recognition software.  Despite my review, spelling/grammar/context errors may still remain.  Interpretation of this chart should be taken in this context.    Electronically signed by Jitendra Vanessa M.D. on 4/8/2022 at 7:09 AM.

## 2022-04-02 NOTE — ASSESSMENT & PLAN NOTE
"Sudden onset abdominal pain since 4/1 associated with constipation  CT abdomen personally reviewed, shows \"Moderate stool within the colon with decompression at level of rectal anastomosis\", consistent with partial bowel obstruction due to sudden onset pain, constipation, nausea, history of multiple abdominal surgeries  Bowel regiment   CLD   Serial abdominal exams  May need NG tube if no improvement  We will consider surgery consult as needed she is a history of multiple abdominal surgeries  Barium study: normal  I will consult Dr. Mathur  "

## 2022-04-02 NOTE — ASSESSMENT & PLAN NOTE
UA is nitrate positive, moderate leukocyte esterase, negative bacteria  Comes in with lower abdominal pain but denies any urinary symptoms at all, no fever/chills, no flank pain  CT abdomen consistent with partial bowel obstruction.  Kidneys appear normal with no hydronephrosis  She received 1 dose antibiotics at outside facility, will not continue with symptoms likely not UTI  Monitor

## 2022-04-02 NOTE — ED NOTES
PT to room via W/C. PT in gown and on monitor with call light in reach. Chart up for next available ERP.

## 2022-04-03 ENCOUNTER — APPOINTMENT (OUTPATIENT)
Dept: RADIOLOGY | Facility: MEDICAL CENTER | Age: 67
End: 2022-04-03
Attending: STUDENT IN AN ORGANIZED HEALTH CARE EDUCATION/TRAINING PROGRAM
Payer: COMMERCIAL

## 2022-04-03 LAB
ALBUMIN SERPL BCP-MCNC: 3.4 G/DL (ref 3.2–4.9)
ALBUMIN/GLOB SERPL: 1.4 G/DL
ALP SERPL-CCNC: 77 U/L (ref 30–99)
ALT SERPL-CCNC: 29 U/L (ref 2–50)
ANION GAP SERPL CALC-SCNC: 7 MMOL/L (ref 7–16)
AST SERPL-CCNC: 23 U/L (ref 12–45)
BILIRUB SERPL-MCNC: 0.7 MG/DL (ref 0.1–1.5)
BUN SERPL-MCNC: 5 MG/DL (ref 8–22)
CALCIUM SERPL-MCNC: 8.4 MG/DL (ref 8.5–10.5)
CHLORIDE SERPL-SCNC: 108 MMOL/L (ref 96–112)
CO2 SERPL-SCNC: 27 MMOL/L (ref 20–33)
CREAT SERPL-MCNC: 0.63 MG/DL (ref 0.5–1.4)
ERYTHROCYTE [DISTWIDTH] IN BLOOD BY AUTOMATED COUNT: 40.7 FL (ref 35.9–50)
GFR SERPLBLD CREATININE-BSD FMLA CKD-EPI: 97 ML/MIN/1.73 M 2
GLOBULIN SER CALC-MCNC: 2.5 G/DL (ref 1.9–3.5)
GLUCOSE SERPL-MCNC: 105 MG/DL (ref 65–99)
HCT VFR BLD AUTO: 37.2 % (ref 37–47)
HGB BLD-MCNC: 12.1 G/DL (ref 12–16)
MCH RBC QN AUTO: 29.6 PG (ref 27–33)
MCHC RBC AUTO-ENTMCNC: 32.5 G/DL (ref 33.6–35)
MCV RBC AUTO: 91 FL (ref 81.4–97.8)
PLATELET # BLD AUTO: 190 K/UL (ref 164–446)
PMV BLD AUTO: 8.7 FL (ref 9–12.9)
POTASSIUM SERPL-SCNC: 3.6 MMOL/L (ref 3.6–5.5)
PROT SERPL-MCNC: 5.9 G/DL (ref 6–8.2)
RBC # BLD AUTO: 4.09 M/UL (ref 4.2–5.4)
SODIUM SERPL-SCNC: 142 MMOL/L (ref 135–145)
WBC # BLD AUTO: 8.5 K/UL (ref 4.8–10.8)

## 2022-04-03 PROCEDURE — 700111 HCHG RX REV CODE 636 W/ 250 OVERRIDE (IP): Performed by: STUDENT IN AN ORGANIZED HEALTH CARE EDUCATION/TRAINING PROGRAM

## 2022-04-03 PROCEDURE — G0378 HOSPITAL OBSERVATION PER HR: HCPCS

## 2022-04-03 PROCEDURE — 96372 THER/PROPH/DIAG INJ SC/IM: CPT

## 2022-04-03 PROCEDURE — 85027 COMPLETE CBC AUTOMATED: CPT

## 2022-04-03 PROCEDURE — 99226 PR SUBSEQUENT OBSERVATION CARE,LEVEL III: CPT | Performed by: INTERNAL MEDICINE

## 2022-04-03 PROCEDURE — 700102 HCHG RX REV CODE 250 W/ 637 OVERRIDE(OP): Performed by: STUDENT IN AN ORGANIZED HEALTH CARE EDUCATION/TRAINING PROGRAM

## 2022-04-03 PROCEDURE — 36415 COLL VENOUS BLD VENIPUNCTURE: CPT

## 2022-04-03 PROCEDURE — A9270 NON-COVERED ITEM OR SERVICE: HCPCS | Performed by: STUDENT IN AN ORGANIZED HEALTH CARE EDUCATION/TRAINING PROGRAM

## 2022-04-03 PROCEDURE — 74270 X-RAY XM COLON 1CNTRST STD: CPT

## 2022-04-03 PROCEDURE — 80053 COMPREHEN METABOLIC PANEL: CPT

## 2022-04-03 PROCEDURE — 700117 HCHG RX CONTRAST REV CODE 255: Performed by: STUDENT IN AN ORGANIZED HEALTH CARE EDUCATION/TRAINING PROGRAM

## 2022-04-03 PROCEDURE — 700105 HCHG RX REV CODE 258: Performed by: STUDENT IN AN ORGANIZED HEALTH CARE EDUCATION/TRAINING PROGRAM

## 2022-04-03 RX ADMIN — IOHEXOL 800 ML: 350 INJECTION, SOLUTION INTRAVENOUS at 08:30

## 2022-04-03 RX ADMIN — ENOXAPARIN SODIUM 40 MG: 40 INJECTION SUBCUTANEOUS at 05:09

## 2022-04-03 RX ADMIN — SENNOSIDES AND DOCUSATE SODIUM 2 TABLET: 50; 8.6 TABLET ORAL at 05:09

## 2022-04-03 RX ADMIN — SODIUM CHLORIDE: 9 INJECTION, SOLUTION INTRAVENOUS at 02:32

## 2022-04-03 RX ADMIN — ACETAMINOPHEN 650 MG: 325 TABLET, FILM COATED ORAL at 20:02

## 2022-04-03 ASSESSMENT — ENCOUNTER SYMPTOMS
ABDOMINAL PAIN: 1
SHORTNESS OF BREATH: 0
COUGH: 0
NERVOUS/ANXIOUS: 0
FLANK PAIN: 0
BLURRED VISION: 0
MYALGIAS: 0
HEADACHES: 0
NAUSEA: 0
DIZZINESS: 0
FEVER: 0
CONSTIPATION: 0
SORE THROAT: 0
VOMITING: 0

## 2022-04-03 ASSESSMENT — PAIN DESCRIPTION - PAIN TYPE
TYPE: ACUTE PAIN
TYPE: ACUTE PAIN

## 2022-04-03 NOTE — PROGRESS NOTES
Assumed care of patient at 1900 from Debbie BLACK. Patient is A&O x4, patient declines pain and states she is feeling less nausea. Bed locked in lowest position with 2 rail up. Call light  in place, belongings at bedside. Hourly rounding is in place. Patient NPO at midnight for barium enema scheduled in morning.

## 2022-04-03 NOTE — PROGRESS NOTES
Hospital Medicine Daily Progress Note    Date of Service  4/3/2022    Chief Complaint  Leti Arana is a 66 y.o. female admitted 4/1/2022 with lower abdominal pain     Hospital Course  No notes on file     67 yo female with past medical history of Type II DM, history of diverticulitis s/p partial colectomy with reanastomosis in 2020 who presents to the hospital with lower abdominal pain. She had recently presented to outside urgent care with similar symptoms and was given antibiotics for urinary tract infection however to not have improvement thus presented here for further evaluation. Vitals were stable on admission. Labs remarkable for WBC 12.2 otherwise stable.   CT abdomen/pelvis done showing moderate stool burden with colon decompression at the level of rectal anastomosis consideration for partial obstruction.   Patient was admitted for further evaluation.     Interval Problem Update  Patient seen and examined, still with some mild nausea and lower abdominal pain, but does feel hungry. No emesis or BM.   - abdomen is soft and vitals are stable   - I did speak with gen surgery informally (no official consult) after discussion, will proceed wt barium enema to evaluate for possible anastomotic stricture, pending results may need GI consultation.   - DC reglan pending barium study results, continue bowel regiment, clear liquid diet hold if any worsening symptoms/emesis     4/3 the patient stated that her abdominal pain is improving and only feeling a little bit of pain across her lower abdominal area.  Denies nausea and vomiting.  Have bowel movement this morning.  We will continue conservative management at this point.  I may likely consult surgery today.  I have personally seen and examined the patient at bedside. I discussed the plan of care with patient and bedside RN.    Consultants/Specialty  NA    Code Status  Full Code    Disposition  Patient is not medically cleared for discharge.   Anticipate  discharge to to home with close outpatient follow-up.  I have placed the appropriate orders for post-discharge needs.    Review of Systems  Review of Systems   Constitutional: Negative for fever.   HENT: Negative for sore throat.    Eyes: Negative for blurred vision.   Respiratory: Negative for cough and shortness of breath.    Cardiovascular: Negative for chest pain and leg swelling.   Gastrointestinal: Positive for abdominal pain. Negative for constipation, nausea and vomiting.   Genitourinary: Negative for dysuria, flank pain, frequency and urgency.   Musculoskeletal: Negative for myalgias.   Neurological: Negative for dizziness and headaches.   Psychiatric/Behavioral: The patient is not nervous/anxious.         Physical Exam  Temp:  [36.2 °C (97.1 °F)-36.5 °C (97.7 °F)] 36.3 °C (97.4 °F)  Pulse:  [66-78] 72  Resp:  [15-19] 19  BP: ()/(52-68) 120/66  SpO2:  [89 %-99 %] 92 %    Physical Exam  Constitutional:       Appearance: Normal appearance. She is obese. She is not ill-appearing or toxic-appearing.   HENT:      Head: Normocephalic and atraumatic.      Mouth/Throat:      Mouth: Mucous membranes are moist.      Pharynx: Oropharynx is clear.   Eyes:      Extraocular Movements: Extraocular movements intact.      Conjunctiva/sclera: Conjunctivae normal.   Cardiovascular:      Rate and Rhythm: Normal rate and regular rhythm.      Pulses: Normal pulses.   Pulmonary:      Effort: Pulmonary effort is normal.   Abdominal:      General: There is no distension.      Palpations: Abdomen is soft. There is no mass.      Tenderness: There is abdominal tenderness (LLQ tenderness ). There is no guarding or rebound.   Musculoskeletal:      Cervical back: Neck supple.      Right lower leg: No edema.      Left lower leg: No edema.   Skin:     General: Skin is warm.   Neurological:      General: No focal deficit present.      Mental Status: She is alert.   Psychiatric:         Mood and Affect: Mood normal.         Fluids  No  "intake or output data in the 24 hours ending 04/03/22 0744    Laboratory  Recent Labs     04/01/22 2239 04/03/22  0302   WBC 12.2* 8.5   RBC 4.72 4.09*   HEMOGLOBIN 14.2 12.1   HEMATOCRIT 42.2 37.2   MCV 89.4 91.0   MCH 30.1 29.6   MCHC 33.6 32.5*   RDW 39.5 40.7   PLATELETCT 264 190   MPV 8.8* 8.7*     Recent Labs     04/01/22 2239 04/03/22  0302   SODIUM 137 142   POTASSIUM 3.7 3.6   CHLORIDE 101 108   CO2 23 27   GLUCOSE 136* 105*   BUN 10 5*   CREATININE 0.70 0.63   CALCIUM 9.1 8.4*                   Imaging  CT-ABDOMEN-PELVIS WITH   Final Result         1.  Moderate stool within the colon with decompression at level of rectal anastomosis, consider component of partial obstruction due to anastomosis. Could be further evaluated with sigmoidoscopy.   2.  Mild intrahepatic and extra hepatic biliary ductal dilatation, commonly associated with postcholecystectomy physiology, consider causes of biliary obstruction with additional workup as clinically appropriate      DX-BARIUM ENEMA    (Results Pending)        Assessment/Plan  * Partial bowel obstruction (HCC)- (present on admission)  Assessment & Plan  Sudden onset abdominal pain since 4/1 associated with constipation  CT abdomen personally reviewed, shows \"Moderate stool within the colon with decompression at level of rectal anastomosis\", consistent with partial bowel obstruction due to sudden onset pain, constipation, nausea, history of multiple abdominal surgeries  Bowel regiment   CLD   Serial abdominal exams  May need NG tube if no improvement  We will consider surgery consult as needed she is a history of multiple abdominal surgeries and depending on barium study    Asymptomatic bacteriuria- (present on admission)  Assessment & Plan  UA is nitrate positive, moderate leukocyte esterase, negative bacteria  Comes in with lower abdominal pain but denies any urinary symptoms at all, no fever/chills, no flank pain  CT abdomen consistent with partial bowel " obstruction.  Kidneys appear normal with no hydronephrosis  She received 1 dose antibiotics at outside facility, will not continue with symptoms likely not UTI  Monitor     Left lower quadrant abdominal pain- (present on admission)  Assessment & Plan  Sudden onset pain since 4/1, no fever/chills, no urinary symptoms  Secondary to partial bowel obstruction due to imaging and presentation  If development of emesis or worsening symptoms will place NGT for decompression   Barium enema ordered to evaluate for anastomotic stricture pending   Clear liquid diet as tolerated   Supportive care   Bowel regiment        VTE prophylaxis: SCDs/TEDs and enoxaparin ppx    I have performed a physical exam and reviewed and updated ROS and Plan today (4/3/2022). In review of yesterday's note (4/2/2022), there are no changes except as documented above.

## 2022-04-03 NOTE — PROGRESS NOTES
4 Eyes Skin Assessment Completed by SOFIA Lewis and SOFIA Calvillo.    Head WDL  Ears WDL  Nose WDL  Mouth WDL  Neck WDL  Breast/Chest WDL  Shoulder Blades WDL  Spine WDL  (R) Arm/Elbow/Hand WDL  (L) Arm/Elbow/Hand WDL  Abdomen Scar  Groin WDL  Scrotum/Coccyx/Buttocks WDL  (R) Leg WDL  (L) Leg WDL  (R) Heel/Foot/Toe WDL  (L) Heel/Foot/Toe WDL          Devices In Places N/A      Interventions In Place Pillows and Pressure Redistribution Mattress    Possible Skin Injury No    Pictures Uploaded Into Epic N/A  Wound Consult Placed N/A  RN Wound Prevention Protocol Ordered No

## 2022-04-04 VITALS
RESPIRATION RATE: 16 BRPM | BODY MASS INDEX: 28.67 KG/M2 | HEIGHT: 63 IN | OXYGEN SATURATION: 92 % | DIASTOLIC BLOOD PRESSURE: 86 MMHG | HEART RATE: 69 BPM | WEIGHT: 161.82 LBS | SYSTOLIC BLOOD PRESSURE: 141 MMHG | TEMPERATURE: 97.7 F

## 2022-04-04 PROCEDURE — 700111 HCHG RX REV CODE 636 W/ 250 OVERRIDE (IP): Performed by: STUDENT IN AN ORGANIZED HEALTH CARE EDUCATION/TRAINING PROGRAM

## 2022-04-04 PROCEDURE — A9270 NON-COVERED ITEM OR SERVICE: HCPCS | Performed by: STUDENT IN AN ORGANIZED HEALTH CARE EDUCATION/TRAINING PROGRAM

## 2022-04-04 PROCEDURE — 700102 HCHG RX REV CODE 250 W/ 637 OVERRIDE(OP): Performed by: STUDENT IN AN ORGANIZED HEALTH CARE EDUCATION/TRAINING PROGRAM

## 2022-04-04 PROCEDURE — G0378 HOSPITAL OBSERVATION PER HR: HCPCS

## 2022-04-04 PROCEDURE — 96372 THER/PROPH/DIAG INJ SC/IM: CPT

## 2022-04-04 PROCEDURE — 99217 PR OBSERVATION CARE DISCHARGE: CPT | Performed by: INTERNAL MEDICINE

## 2022-04-04 PROCEDURE — 700105 HCHG RX REV CODE 258: Performed by: STUDENT IN AN ORGANIZED HEALTH CARE EDUCATION/TRAINING PROGRAM

## 2022-04-04 RX ADMIN — ACETAMINOPHEN 650 MG: 325 TABLET, FILM COATED ORAL at 08:07

## 2022-04-04 RX ADMIN — ENOXAPARIN SODIUM 40 MG: 40 INJECTION SUBCUTANEOUS at 05:05

## 2022-04-04 RX ADMIN — SODIUM CHLORIDE: 9 INJECTION, SOLUTION INTRAVENOUS at 01:32

## 2022-04-04 ASSESSMENT — ENCOUNTER SYMPTOMS
HEADACHES: 0
CONSTIPATION: 0
MYALGIAS: 0
FEVER: 0
ABDOMINAL PAIN: 1
NERVOUS/ANXIOUS: 0
SORE THROAT: 0
VOMITING: 0
FLANK PAIN: 0
CHILLS: 0
DIZZINESS: 0
NAUSEA: 0
HEARTBURN: 0
DOUBLE VISION: 0
SHORTNESS OF BREATH: 0
TINGLING: 0
BLURRED VISION: 0
COUGH: 0

## 2022-04-04 ASSESSMENT — PAIN DESCRIPTION - PAIN TYPE: TYPE: ACUTE PAIN

## 2022-04-04 NOTE — PROGRESS NOTES
Pt discharged home in stable condition. IV removed from left AC. All discharge paperwork including follow up appointments reviewed with pt verbalizing understanding. Interperter used on IPad for discharge education: Nuvia #[id number]. Pt transported to son's car with wheelchair. All belongings left with pt.

## 2022-04-04 NOTE — CARE PLAN
The patient is Stable - Low risk of patient condition declining or worsening    Shift Goals  Clinical Goals: ambulate  Patient Goals: rest, control pain    Progress made toward(s) clinical / shift goals:    Problem: Nutrition  Goal: Patient's nutritional and fluid intake will be adequate or improve  Outcome: Progressing  Note: On clears.  Tolerating well.       Problem: Bowel Elimination  Goal: Establish and maintain regular bowel function  Outcome: Progressing  Note: Patient reports passing gas and had a small BM this morning.       Problem: Gastrointestinal Irritability  Goal: Nausea and vomiting will be absent or improve  Outcome: Progressing  Note: No complaints of nausea or vomiting.         Patient is not progressing towards the following goals:

## 2022-04-04 NOTE — PROGRESS NOTES
Assumed care of patient at 1900 from Debbie BLACK. Patient is A&O x4, states pain level is 7/10 in her lower abdomin, patient request tylenol for pain. Bed locked in lowest position with 2 rail up. Call light  in place, belongings at bedside. Hourly rounding is in place.  Patient states she has been eating a high fiber diet and drinks 8 glasses of water a day. She has had very bad abdominal pain over the last 2 months. She only eats 2 small meals a day because of pain. The patient states she has lost 15 lbs in 2 months. Patient is concerned about her weight loss, constipation, and pain. RN provided education about diverticulitis for the patient. Will continue to monitor.

## 2022-04-04 NOTE — CARE PLAN
Problem: Knowledge Deficit - Standard  Goal: Patient and family/care givers will demonstrate understanding of plan of care, disease process/condition, diagnostic tests and medications  4/3/2022 2248 by Pily Mark, R.N.  Outcome: Progressing  4/3/2022 2246 by Pily Mark R.N.  Outcome: Not Met     Problem: Gastrointestinal Irritability  Goal: Nausea and vomiting will be absent or improve  4/3/2022 2248 by Pily Mark, R.N.  Outcome: Progressing  4/3/2022 2246 by Pily Mark R.N.  Outcome: Progressing   The patient is Stable - Low risk of patient condition declining or worsening    Shift Goals  Clinical Goals: Pain control, pt education  Patient Goals: Education, comfort    Progress made toward(s) clinical / shift goals:  Patient no long has nausea and is tolerating clear liquid diet. Patient was educated about diverticulitis, patient shows understanding of plan of care.     Patient is not progressing towards the following goals:

## 2022-04-04 NOTE — DISCHARGE PLANNING
Care Transition Team Discharge Planning    Anticipated Discharge Disposition: DC home.    Action: Lsw met with patient to complete CTT assessment. Patient presented  A&Ox4 e/b being able to confirm the accuracy on the facesheet.     The name of PCP is Genna GRIMES.  Lsw contacted the language line to provide Camb[place]ian translation services. Jake [phone] provided the services.    Patient resides with son in a one story trailer. There are 3 steps to enter the front door. Patient reported being independent with ADLs and IADLs.     Barriers to Discharge: None    Plan: Lsw will assist medical team with DC planning.    Care Transition Team Assessment    Information Source  Orientation Level: Oriented X4  Information Given By: Patient  Informant's Name: Leti Arana  Who is responsible for making decisions for patient? : Patient    Readmission Evaluation  Is this a readmission?: Yes - unplanned readmission  Why do you think you were readmitted?: Pain  Was an appointment arranged for you prior to discharge?: No  Were there new prescriptions you were supposed to fill after you were discharged?: No  Did you understand your discharge instructions?: Yes  Did you have enough support after your last discharge?: Yes    Elopement Risk  Legal Hold: No  Ambulatory or Self Mobile in Wheelchair: Yes  Disoriented: No  Psychiatric Symptoms: None  History of Wandering: No  Elopement this Admit: No  Vocalizing Wanting to Leave: No  Displays Behaviors, Body Language Wanting to Leave: No-Not at Risk for Elopement  Elopement Risk: Not at Risk for Elopement    Interdisciplinary Discharge Planning  Lives with - Patient's Self Care Capacity: Alone and Able to Care For Self  Patient or legal guardian wants to designate a caregiver: No  Support Systems: Family Member(s)  Housing / Facility: 1 Story House  Patient Prefers to be Discharged to:: home  Durable Medical Equipment: Not Applicable    Discharge Preparedness  What is your plan after  discharge?: Home with help  What are your discharge supports?: Child  Prior Functional Level: Ambulatory,Drives Self,Independent with Activities of Daily Living,Independent with Medication Management  Difficulity with ADLs: None  Difficulity with IADLs: None    Functional Assesment  Prior Functional Level: Ambulatory,Drives Self,Independent with Activities of Daily Living,Independent with Medication Management    Finances  Financial Barriers to Discharge: No  Prescription Coverage: Yes    Vision / Hearing Impairment  Vision Impairment : No  Hearing Impairment : No         Advance Directive  Advance Directive?: None  Advance Directive offered?: AD Booklet refused    Domestic Abuse  Have you ever been the victim of abuse or violence?: No  Physical Abuse or Sexual Abuse: No  Verbal Abuse or Emotional Abuse: No  Possible Abuse/Neglect Reported to:: Not Applicable    Psychological Assessment  History of Substance Abuse: None  History of Psychiatric Problems: No  Non-compliant with Treatment: No  Newly Diagnosed Illness: Yes    Discharge Risks or Barriers  Discharge risks or barriers?: No  Patient risk factors: Readmission    Anticipated Discharge Information  Discharge Disposition: Discharged to home/self care (01)  Discharge Address: [address], [place] [address]  Discharge Contact Phone Number: [phone]

## 2022-04-04 NOTE — PROGRESS NOTES
Hospital Medicine Daily Progress Note    Date of Service  4/4/2022    Chief Complaint  Leti Arana is a 66 y.o. female admitted 4/1/2022 with lower abdominal pain     Hospital Course  No notes on file     65 yo female with past medical history of Type II DM, history of diverticulitis s/p partial colectomy with reanastomosis in 2020 who presents to the hospital with lower abdominal pain. She had recently presented to outside urgent care with similar symptoms and was given antibiotics for urinary tract infection however to not have improvement thus presented here for further evaluation. Vitals were stable on admission. Labs remarkable for WBC 12.2 otherwise stable.   CT abdomen/pelvis done showing moderate stool burden with colon decompression at the level of rectal anastomosis consideration for partial obstruction.   Patient was admitted for further evaluation.     Interval Problem Update  Patient seen and examined, still with some mild nausea and lower abdominal pain, but does feel hungry. No emesis or BM.   - abdomen is soft and vitals are stable   - I did speak with gen surgery informally (no official consult) after discussion, will proceed wt barium enema to evaluate for possible anastomotic stricture, pending results may need GI consultation.   - DC reglan pending barium study results, continue bowel regiment, clear liquid diet hold if any worsening symptoms/emesis     4/3 the patient stated that her abdominal pain is improving and only feeling a little bit of pain across her lower abdominal area.  Denies nausea and vomiting.  Have bowel movement this morning.  We will continue conservative management at this point.  I may likely consult surgery today.    4/4: feeling better. Tolerating clear liquid diet. Having BM. Still having pain in her lower abdomen. I will reach out to Dr. Mathur today about recommendation and likely follow up visit.     I have personally seen and examined the patient at bedside. I  discussed the plan of care with patient and bedside RN.    Consultants/Specialty  NA    Code Status  Full Code    Disposition  Patient is not medically cleared for discharge.   Anticipate discharge to to home with close outpatient follow-up.  I have placed the appropriate orders for post-discharge needs.    Review of Systems  Review of Systems   Constitutional: Negative for chills and fever.   HENT: Negative for sore throat.    Eyes: Negative for blurred vision and double vision.   Respiratory: Negative for cough and shortness of breath.    Cardiovascular: Negative for chest pain and leg swelling.   Gastrointestinal: Positive for abdominal pain. Negative for constipation, heartburn, nausea and vomiting.   Genitourinary: Negative for dysuria, flank pain, frequency and urgency.   Musculoskeletal: Negative for myalgias.   Neurological: Negative for dizziness, tingling and headaches.   Psychiatric/Behavioral: The patient is not nervous/anxious.         Physical Exam  Temp:  [36.2 °C (97.1 °F)-36.5 °C (97.7 °F)] 36.5 °C (97.7 °F)  Pulse:  [65-88] 69  Resp:  [16-18] 16  BP: (112-141)/(67-86) 141/86  SpO2:  [90 %-95 %] 92 %    Physical Exam  Constitutional:       Appearance: Normal appearance. She is obese. She is not ill-appearing or toxic-appearing.   HENT:      Head: Normocephalic and atraumatic.      Mouth/Throat:      Mouth: Mucous membranes are moist.      Pharynx: Oropharynx is clear.   Eyes:      Extraocular Movements: Extraocular movements intact.      Conjunctiva/sclera: Conjunctivae normal.   Cardiovascular:      Rate and Rhythm: Normal rate and regular rhythm.      Pulses: Normal pulses.      Heart sounds: Normal heart sounds.   Pulmonary:      Effort: Pulmonary effort is normal.      Breath sounds: Normal breath sounds.   Abdominal:      General: There is no distension.      Palpations: Abdomen is soft. There is no mass.      Tenderness: There is abdominal tenderness (LLQ tenderness ). There is no guarding or  "rebound.   Musculoskeletal:      Cervical back: Neck supple.      Right lower leg: No edema.      Left lower leg: No edema.   Skin:     General: Skin is warm.   Neurological:      General: No focal deficit present.      Mental Status: She is alert.   Psychiatric:         Mood and Affect: Mood normal.         Behavior: Behavior normal.         Fluids  No intake or output data in the 24 hours ending 04/04/22 0853    Laboratory  Recent Labs     04/01/22 2239 04/03/22  0302   WBC 12.2* 8.5   RBC 4.72 4.09*   HEMOGLOBIN 14.2 12.1   HEMATOCRIT 42.2 37.2   MCV 89.4 91.0   MCH 30.1 29.6   MCHC 33.6 32.5*   RDW 39.5 40.7   PLATELETCT 264 190   MPV 8.8* 8.7*     Recent Labs     04/01/22 2239 04/03/22  0302   SODIUM 137 142   POTASSIUM 3.7 3.6   CHLORIDE 101 108   CO2 23 27   GLUCOSE 136* 105*   BUN 10 5*   CREATININE 0.70 0.63   CALCIUM 9.1 8.4*                   Imaging  DX-BARIUM ENEMA   Final Result      1.  Mild narrowing at the anastomosis in the rectosigmoid colon without evidence of obstruction.      2.  Diverticulosis.      CT-ABDOMEN-PELVIS WITH   Final Result         1.  Moderate stool within the colon with decompression at level of rectal anastomosis, consider component of partial obstruction due to anastomosis. Could be further evaluated with sigmoidoscopy.   2.  Mild intrahepatic and extra hepatic biliary ductal dilatation, commonly associated with postcholecystectomy physiology, consider causes of biliary obstruction with additional workup as clinically appropriate           Assessment/Plan  * Partial bowel obstruction (HCC)- (present on admission)  Assessment & Plan  Sudden onset abdominal pain since 4/1 associated with constipation  CT abdomen personally reviewed, shows \"Moderate stool within the colon with decompression at level of rectal anastomosis\", consistent with partial bowel obstruction due to sudden onset pain, constipation, nausea, history of multiple abdominal surgeries  Bowel regiment   CLD "   Serial abdominal exams  May need NG tube if no improvement  We will consider surgery consult as needed she is a history of multiple abdominal surgeries  Barium study: normal  I will consult Dr. Mathur    Asymptomatic bacteriuria- (present on admission)  Assessment & Plan  UA is nitrate positive, moderate leukocyte esterase, negative bacteria  Comes in with lower abdominal pain but denies any urinary symptoms at all, no fever/chills, no flank pain  CT abdomen consistent with partial bowel obstruction.  Kidneys appear normal with no hydronephrosis  She received 1 dose antibiotics at outside facility, will not continue with symptoms likely not UTI  Monitor     Left lower quadrant abdominal pain- (present on admission)  Assessment & Plan  Sudden onset pain since 4/1, no fever/chills, no urinary symptoms  Secondary to partial bowel obstruction due to imaging and presentation  If development of emesis or worsening symptoms will place NGT for decompression   Barium enema ordered to evaluate for anastomotic stricture pending   Clear liquid diet as tolerated   Supportive care   Bowel regiment   Improving with BM  Will reach out to Dr. Mahtur today       VTE prophylaxis: SCDs/TEDs and enoxaparin ppx    I have performed a physical exam and reviewed and updated ROS and Plan today (4/4/2022). In review of yesterday's note (4/3/2022), there are no changes except as documented above.

## 2022-04-04 NOTE — DISCHARGE INSTRUCTIONS
Diverticulitis  Diverticulitis    La diverticulitis ocurre cuando pequeños bolsillos que se dela cruz   formado en el intestino grueso (colon) se infectan o se inflaman.   [place] produce dolor de estómago y heces líquidas (diarrea).  Estas bolsas en el colon se denominan divertículos. Se jan en   las personas que tienen lennox afección llamada diverticulitis.  Siga estas indicaciones en bernal casa:  Medicamentos  · [place] los medicamentos de venta marcela y los recetados   · solamente nereida se lo haya indicado el médico. Estos   · incluyen los siguientes:  ? Antibióticos.  ? Analgésicos.  ? Pastillas de fibra.  ? Probióticos.  ? Laxantes.  · No conduzca ni use maquinaria pesada mientras newton analgésicos recetados.  · Si le recetaron un antibiótico, tómelo nereida se lo hayan indicado. No deje de tomarlos aunque se sienta mejor.  Instrucciones generales    · Siga la dieta nereida se lo haya indicado el médico.  · Cuando se sienta mejor, el médico puede indicarle que cambie la dieta. Lavelle vez necesite ingerir gran cantidad de fibra. La fibra facilita la evacuación intestinal (defecación). Entre los alimentos saludables con fibra, se incluyen los siguientes:  ? Bk rojos.  ? Frijoles.  ? Lentejas.  ? Verduras de hoja radha.  · Christoph ejercicios 3 o más veces por semana. Hágalos savana 30 minutos cada vez. Ejercítese lo suficiente nereida para transpirar y acelerar los latidos cardíacos.  · Concurra a todas las visitas de control nereida se lo hayan indicado. [place] es importante. Puede que tenga que someterse a un examen del intestino grueso. [place] se denomina colonoscopia.  Comuníquese con un médico si:  · El dolor no mejora.  · Le ayde mucho comer o beber.  · No defeca nereida lo hace normalmente.  Solicite ayuda de inmediato si:  · El dolor empeora.  · Los problemas no mejoran.  · Los problemas empeoran muy rápidamente.  · Tiene fiebre.  · Devuelve (vomita) más de lennox vez.  · Silvia heces tienen las siguientes características:  ? Contienen  eliel.  ? Son de color mohamud.  ? Son alquitranadas.  Resumen  · La diverticulitis ocurre cuando pequeños bolsillos que se dela cruz formado en el intestino grueso (colon) se infectan o se inflaman.  · [place] los medicamentos solamente nereida se lo haya indicado el médico.  · Siga la dieta nereida se lo haya indicado el médico.  Esta información no tiene nereida fin reemplazar el consejo del médico. Asegúrese de hacerle al médico cualquier pregunta que tenga.  Document Released: 12/06/2012 Document Revised: 06/21/2018 Document Reviewed: 06/21/2018  [employer] Patient Education © 2020 [employer]    Discharge Instructions    Discharged to home by car with relative. Discharged via wheelchair, hospital escort: Yes.  Special equipment needed: Not Applicable    Be sure to schedule a follow-up appointment with your primary care doctor or any specialists as instructed.     Discharge Plan:   Diet Plan: Discussed  Activity Level: Discussed  Confirmed Follow up Appointment: Patient to Call and Schedule Appointment  Confirmed Symptoms Management: Discussed  Medication Reconciliation Updated: Yes    I understand that a diet low in cholesterol, fat, and sodium is recommended for good health. Unless I have been given specific instructions below for another diet, I accept this instruction as my diet prescription.   Other diet: Clear liquid advance as tolerated    Special Instructions: None    · Is patient discharged on Warfarin / Coumadin?   No     Depression / Suicide Risk    As you are discharged from this [facility] facility, it is important to learn how to keep safe from harming yourself.    Recognize the warning signs:  · Abrupt changes in personality, positive or negative- including increase in energy   · Giving away possessions  · Change in eating patterns- significant weight changes-  positive or negative  · Change in sleeping patterns- unable to sleep or sleeping all the time   · Unwillingness or inability to  communicate  · Depression  · Unusual sadness, discouragement and loneliness  · Talk of wanting to die  · Neglect of personal appearance   · Rebelliousness- reckless behavior  · Withdrawal from people/activities they love  · Confusion- inability to concentrate     If you or a loved one observes any of these behaviors or has concerns about self-harm, here's what you can do:  · Talk about it- your feelings and reasons for harming yourself  · Remove any means that you might use to hurt yourself (examples: pills, rope, extension cords, firearm)  · Get professional help from the community (Mental Health, Substance Abuse, psychological counseling)  · Do not be alone:Call your Safe Contact- someone whom you trust who will be there for you.  · Call your local CRISIS HOTLINE 935-1308 or 275-325-1477  · Call your local Children's Mobile Crisis Response Team [place] (296) 558-5780 or www.[employer]  · Call the toll free National Suicide Prevention Hotlines   · National Suicide Prevention Lifeline 630-134-DTTB (4955)  · National Hope Line Network 800-SUICIDE (539-1024)

## 2022-04-04 NOTE — DISCHARGE SUMMARY
Discharge Summary    CHIEF COMPLAINT ON ADMISSION  Chief Complaint   Patient presents with   • LLQ Pain     Pt reports pain in the LUQ and LLQ of abdomen starting at 3 am. Pt describes pain as sharp and a 9/10. Pt reports she went to [facility] in [place] earlier today where she was prescribed abx for a UTI. Pt denies radiation of pain to the L flank region. Pt reports pain has been worsening since she left the hospital earlier so her daughter called EMS. Pt was given 100 mcg of fentanyl and 4 mg of Zofran by EMS. Hx of diverticulitis.   • N/V     Starting at 3am       Reason for Admission  Abdominal Pain     Admission Date  4/1/2022    CODE STATUS  Full Code    HPI & HOSPITAL COURSE  65 yo female with past medical history of Type II DM, history of diverticulitis s/p partial colectomy with reanastomosis in 2020 who presents to the hospital with lower abdominal pain. She had recently presented to outside urgent care with similar symptoms and was given antibiotics for urinary tract infection however to not have improvement thus presented here for further evaluation. Vitals were stable on admission. Labs remarkable for WBC 12.2 otherwise stable.   CT abdomen/pelvis done showing moderate stool burden with colon decompression at the level of rectal anastomosis consideration for partial obstruction.   Patient was admitted for further evaluation.     She was treated conservatively with IV fluids and pain management.  Her symptoms continue to improve slowly.  Her leukocytosis has resolved..  Enema study showed no significant acute finding.  She has been tolerating clear liquid diet.  And having good bowel movement.  I reach out to Dr. Mathur who is her primary surgery for recommendation and after he reviewed her medical record, he recommended that the patient can be discharged home and follow-up with him as an outpatient.  She was instructed to continue to eat liquid diet for the next several days and advance diet as tolerated  slowly.  She was also educated to come back to ER if her symptoms get worse.  I saw and examined the patient upon discharge.  Please call [phone] to schedule PCP appointment for patient.    Required specialty appointments include:     As below  Therefore, she is discharged in fair and stable condition to home with close outpatient follow-up.        Discharge Date  04/04/22      FOLLOW UP ITEMS POST DISCHARGE      DISCHARGE DIAGNOSES  Principal Problem:    Partial bowel obstruction (HCC) POA: Yes  Active Problems:    Left lower quadrant abdominal pain POA: Yes    Asymptomatic bacteriuria POA: Yes  Resolved Problems:    * No resolved hospital problems. *      FOLLOW UP  No future appointments.  Armond Quevedo M.D.  [address] [facility] [id number] [phone]    In 1 week      Arun Mathur M.D.  [address] [facility] [place] [id number] [phone]    In 1 week        MEDICATIONS ON DISCHARGE     Medication List      STOP taking these medications    magnesium hydroxide 400 MG/5ML Susp  Commonly known as: MILK OF MAGNESIA            Allergies  No Known Allergies    DIET  Orders Placed This Encounter   Procedures   • Diet Order Diet: Clear Liquid     Standing Status:   Standing     Number of Occurrences:   1     Order Specific Question:   Diet:     Answer:   Clear Liquid [10]       ACTIVITY  As tolerated.  Weight bearing as tolerated    CONSULTATIONS  Dr. Mathur    PROCEDURES      LABORATORY  Lab Results   Component Value Date    SODIUM 142 04/03/2022    POTASSIUM 3.6 04/03/2022    CHLORIDE 108 04/03/2022    CO2 27 04/03/2022    GLUCOSE 105 (H) 04/03/2022    BUN 5 (L) 04/03/2022    CREATININE 0.63 04/03/2022        Lab Results   Component Value Date    WBC 8.5 04/03/2022    HEMOGLOBIN 12.1 04/03/2022    HEMATOCRIT 37.2 04/03/2022    PLATELETCT 190 04/03/2022        Total time of the discharge process exceeds 39 minutes.

## 2022-08-05 ENCOUNTER — OFFICE VISIT (OUTPATIENT)
Dept: URGENT CARE | Facility: PHYSICIAN GROUP | Age: 67
End: 2022-08-05
Payer: COMMERCIAL

## 2022-08-05 VITALS
SYSTOLIC BLOOD PRESSURE: 122 MMHG | DIASTOLIC BLOOD PRESSURE: 82 MMHG | OXYGEN SATURATION: 98 % | BODY MASS INDEX: 27.49 KG/M2 | HEIGHT: 65 IN | HEART RATE: 83 BPM | TEMPERATURE: 98 F | WEIGHT: 165 LBS | RESPIRATION RATE: 16 BRPM

## 2022-08-05 DIAGNOSIS — R10.32 LEFT GROIN PAIN: ICD-10-CM

## 2022-08-05 DIAGNOSIS — M54.10 RADICULAR PAIN OF LEFT LOWER EXTREMITY: ICD-10-CM

## 2022-08-05 PROCEDURE — 99213 OFFICE O/P EST LOW 20 MIN: CPT | Performed by: NURSE PRACTITIONER

## 2022-08-05 RX ORDER — BENZONATATE 100 MG/1
CAPSULE ORAL
COMMUNITY
End: 2022-08-05

## 2022-08-05 RX ORDER — KETOROLAC TROMETHAMINE 30 MG/ML
30 INJECTION, SOLUTION INTRAMUSCULAR; INTRAVENOUS ONCE
Status: COMPLETED | OUTPATIENT
Start: 2022-08-05 | End: 2022-08-05

## 2022-08-05 RX ORDER — NAPROXEN 375 MG/1
375 TABLET ORAL 2 TIMES DAILY WITH MEALS
Qty: 28 TABLET | Refills: 0 | Status: SHIPPED | OUTPATIENT
Start: 2022-08-05 | End: 2022-08-19

## 2022-08-05 RX ORDER — TIZANIDINE HYDROCHLORIDE 2 MG/1
2-4 CAPSULE, GELATIN COATED ORAL EVERY 6 HOURS PRN
Qty: 20 CAPSULE | Refills: 0 | Status: SHIPPED | OUTPATIENT
Start: 2022-08-05 | End: 2022-09-24

## 2022-08-05 RX ORDER — HYDROCODONE BITARTRATE AND ACETAMINOPHEN 5; 325 MG/1; MG/1
TABLET ORAL
COMMUNITY
End: 2022-08-05

## 2022-08-05 RX ORDER — NITROFURANTOIN 25; 75 MG/1; MG/1
CAPSULE ORAL
COMMUNITY
End: 2022-08-05

## 2022-08-05 RX ORDER — METHYLPREDNISOLONE 4 MG/1
TABLET ORAL
Qty: 21 TABLET | Refills: 0 | Status: SHIPPED | OUTPATIENT
Start: 2022-08-05 | End: 2022-09-24

## 2022-08-05 RX ORDER — AMOXICILLIN 500 MG/1
CAPSULE ORAL
COMMUNITY
End: 2022-08-05

## 2022-08-05 RX ORDER — NAPROXEN 500 MG/1
500 TABLET ORAL 2 TIMES DAILY PRN
COMMUNITY
Start: 2022-06-17 | End: 2022-08-05

## 2022-08-05 RX ORDER — TIZANIDINE 4 MG/1
4 TABLET ORAL 2 TIMES DAILY PRN
COMMUNITY
Start: 2022-06-17 | End: 2022-08-05

## 2022-08-05 RX ADMIN — KETOROLAC TROMETHAMINE 30 MG: 30 INJECTION, SOLUTION INTRAMUSCULAR; INTRAVENOUS at 14:51

## 2022-08-05 ASSESSMENT — ENCOUNTER SYMPTOMS: LEG PAIN: 1

## 2022-08-05 ASSESSMENT — FIBROSIS 4 INDEX: FIB4 SCORE: 1.51

## 2022-08-05 NOTE — PROGRESS NOTES
Subjective:     Leti Arana is a 67 y.o. female who presents for Leg Pain (Started hurting at 3 a.m. thinks it's nerve pain happened about 10 years ago)      Presents for pain to right groin, hip, and thigh. No leg swelling. Pain to touch. States she has a nerve pain that radiated towards her calf .  Increased pain with side stepping. Starts in groin. No numbness. Throbbing pain, 10/10. Took Ibuprofen this morning. Hx of similar pain to the other side. No fever. No rash. No hx of left hip or back issues. Denies injury.          Leg Pain  This is a recurrent problem.     Past Medical History:   Diagnosis Date    Diabetes mellitus type II 10/17/2012    Diet controlled/medications D/C    Diverticulitis        Past Surgical History:   Procedure Laterality Date    GA PART REMOVAL COLON W ANASTOMOSIS  5/18/2020    Procedure: COLECTOMY, SIGMOID;  Surgeon: Arun Mathur M.D.;  Location: SURGERY [facility];  Service: General    CHOLECYSTECTOMY  1990    hysterectomy    ABDOMINAL HYSTERECTOMY TOTAL      total       Social History     Socioeconomic History    Marital status: Single     Spouse name: Not on file    Number of children: Not on file    Years of education: Not on file    Highest education level: Not on file   Occupational History    Not on file   Tobacco Use    Smoking status: Never    Smokeless tobacco: Never   Vaping Use    Vaping Use: Never used   Substance and Sexual Activity    Alcohol use: No    Drug use: No    Sexual activity: Not on file   Other Topics Concern    Not on file   Social History Narrative    Not on file     Social Determinants of Health     Financial Resource Strain: Not on file   Food Insecurity: Not on file   Transportation Needs: Not on file   Physical Activity: Not on file   Stress: Not on file   Social Connections: Not on file   Intimate Partner Violence: Not on file   Housing Stability: Not on file        Family History   Problem Relation Age of Onset    Diabetes Neg Hx      "Hypertension Neg Hx     Heart Disease Neg Hx     Cancer Neg Hx         No Known Allergies    Review of Systems   Musculoskeletal:  Positive for joint pain.   Neurological:  Negative for sensory change.   All other systems reviewed and are negative.     Objective:   /82   Pulse 83   Temp 36.7 °C (98 °F) (Temporal)   Resp 16   Ht 1.651 m (5' 5\")   Wt 74.8 kg (165 lb)   SpO2 98%   BMI 27.46 kg/m²     Physical Exam  Vitals reviewed.   Constitutional:       General: She is not in acute distress.  Pulmonary:      Effort: Pulmonary effort is normal.   Musculoskeletal:         General: Tenderness present. No swelling or deformity.      Lumbar back: No edema, deformity, signs of trauma or bony tenderness.      Left hip: Tenderness present. No crepitus. Normal strength.      Comments: Mild lateral left hip and groin TTP. No spinal TTP. No swelling, erythema, bruising, or rash to thigh. Pain to proximal lateral left thigh. Able to bear weight. Grimacing with isolated ROM of left hip.    Skin:     Capillary Refill: Capillary refill takes less than 2 seconds.      Findings: No erythema or rash.   Neurological:      General: No focal deficit present.      Mental Status: She is alert and oriented to person, place, and time.   Psychiatric:         Mood and Affect: Mood normal.         Behavior: Behavior normal.       Assessment/Plan:   1. Left groin pain  - ketorolac (TORADOL) injection 30 mg  - DX-HIP-UNILATERAL-WITH PELVIS-1 VIEW LEFT; Future  - naproxen (NAPROSYN) 375 MG Tab; Take 1 Tablet by mouth 2 times a day with meals for 14 days.  Dispense: 28 Tablet; Refill: 0  - tizanidine (ZANAFLEX) 2 MG capsule; Take 1-2 Capsules by mouth every 6 hours as needed (musculoskeletal pain or spasms).  Dispense: 20 Capsule; Refill: 0    2. Radicular pain of left lower extremity  - DX-HIP-UNILATERAL-WITH PELVIS-1 VIEW LEFT; Future  - methylPREDNISolone (MEDROL DOSEPAK) 4 MG Tablet Therapy Pack; Follow schedule on package " instructions.  Dispense: 21 Tablet; Refill: 0    -Take medication as prescribed. Discussed sedative effects of muscle relaxers.  -Can also take OTC Tylenol as directed for pain.   -Temperature Therapy: Heat or ice, whichever feels better.  -Gentle ROM stretches and exercises. Resume activity as tolerated.    Follow up with your primary care doctor. Follow up for persistent, new, or worsening pain. Emergently for weakness, loss of bowel or bladder, groin or rectal numbness, fevers, thigh swelling or redness, or heat to leg.    No saddle anesthesia, leg weakness, or sensory changes.  Contingent medrol. Advised to try muscel relaxer and naproxen first. Patient to f/u on imaging with joint pain. No S&S of DVT.     Differential diagnosis, natural history, supportive care, and indications for immediate follow-up discussed.

## 2022-08-05 NOTE — LETTER
August 5, 2022         Patient: Leti Arana   YOB: 1955   Date of Visit: 8/5/2022           To Whom it May Concern:    Leti Arana was seen in my clinic on 8/5/2022. She may return to work on 8/10/2022. May return sooner if symptoms improve.    If you have any questions or concerns, please don't hesitate to call.        Sincerely,           ROSEANNE Keller.  Electronically Signed

## 2022-08-05 NOTE — PATIENT INSTRUCTIONS
-Take medication as prescribed. Discussed sedative effects of muscle relaxers.  -Can also take OTC Tylenol as directed for pain.   -Temperature Therapy: Heat or ice, whichever feels better.  -Gentle ROM back stretches and exercises. Resume activity as tolerated.    Follow up with your primary care doctor. Follow up for persistent, new, or worsening pain. Emergently for weakness, loss of bowel or bladder, groin or rectal numbness, fevers, thigh swelling or redness, or heat to leg.

## 2022-08-10 ASSESSMENT — ENCOUNTER SYMPTOMS: SENSORY CHANGE: 0

## 2022-09-24 ENCOUNTER — OFFICE VISIT (OUTPATIENT)
Dept: URGENT CARE | Facility: PHYSICIAN GROUP | Age: 67
End: 2022-09-24
Payer: COMMERCIAL

## 2022-09-24 VITALS
HEART RATE: 81 BPM | SYSTOLIC BLOOD PRESSURE: 114 MMHG | RESPIRATION RATE: 20 BRPM | DIASTOLIC BLOOD PRESSURE: 68 MMHG | OXYGEN SATURATION: 94 % | HEIGHT: 64 IN | TEMPERATURE: 97.2 F | WEIGHT: 159 LBS | BODY MASS INDEX: 27.14 KG/M2

## 2022-09-24 DIAGNOSIS — M26.629 TMJ SYNDROME: ICD-10-CM

## 2022-09-24 DIAGNOSIS — R11.0 NAUSEA: ICD-10-CM

## 2022-09-24 PROCEDURE — 99214 OFFICE O/P EST MOD 30 MIN: CPT | Performed by: FAMILY MEDICINE

## 2022-09-24 RX ORDER — METHYLPREDNISOLONE 4 MG/1
TABLET ORAL
Qty: 21 TABLET | Refills: 0 | Status: SHIPPED | OUTPATIENT
Start: 2022-09-24 | End: 2022-09-30

## 2022-09-24 RX ORDER — KETOROLAC TROMETHAMINE 30 MG/ML
30 INJECTION, SOLUTION INTRAMUSCULAR; INTRAVENOUS ONCE
Status: COMPLETED | OUTPATIENT
Start: 2022-09-24 | End: 2022-09-24

## 2022-09-24 RX ORDER — ONDANSETRON 4 MG/1
TABLET, ORALLY DISINTEGRATING ORAL
Qty: 20 TABLET | Refills: 0 | Status: SHIPPED | OUTPATIENT
Start: 2022-09-24

## 2022-09-24 RX ADMIN — KETOROLAC TROMETHAMINE 30 MG: 30 INJECTION, SOLUTION INTRAMUSCULAR; INTRAVENOUS at 11:33

## 2022-09-24 ASSESSMENT — FIBROSIS 4 INDEX: FIB4 SCORE: 1.51

## 2022-09-24 ASSESSMENT — PAIN SCALES - GENERAL: PAINLEVEL: 5=MODERATE PAIN

## 2022-09-24 NOTE — PROGRESS NOTES
Chief Complaint:    Chief Complaint   Patient presents with    Otalgia     Pt states Left ear pain started 4 weeks ago       History of Present Illness:    Son present and helps translate. Patient is having pain around left ear x 1 month. She had her ear cleaned out at [place] during the past month. They report Toradol IM + Medrol Dose Peña rx'd by other provider on 8/5/22, worked and were tolerated for other painful condition, visit reviewed. She also complains of intermittent nausea and asks for something for nausea.      Past Medical History:    Past Medical History:   Diagnosis Date    Diabetes mellitus type II 10/17/2012    Diet controlled/medications D/C    Diverticulitis      Past Surgical History:    Past Surgical History:   Procedure Laterality Date    ME PART REMOVAL COLON W ANASTOMOSIS  5/18/2020    Procedure: COLECTOMY, SIGMOID;  Surgeon: Arun aMthur M.D.;  Location: SURGERY [facility];  Service: General    CHOLECYSTECTOMY  1990    hysterectomy    ABDOMINAL HYSTERECTOMY TOTAL      total     Social History:    Social History     Socioeconomic History    Marital status: Single     Spouse name: Not on file    Number of children: Not on file    Years of education: Not on file    Highest education level: Not on file   Occupational History    Not on file   Tobacco Use    Smoking status: Never    Smokeless tobacco: Never   Vaping Use    Vaping Use: Never used   Substance and Sexual Activity    Alcohol use: No    Drug use: No    Sexual activity: Not on file   Other Topics Concern    Not on file   Social History Narrative    Not on file     Social Determinants of Health     Financial Resource Strain: Not on file   Food Insecurity: Not on file   Transportation Needs: Not on file   Physical Activity: Not on file   Stress: Not on file   Social Connections: Not on file   Intimate Partner Violence: Not on file   Housing Stability: Not on file     Family History:    Family History   Problem Relation Age of Onset  "   Diabetes Neg Hx     Hypertension Neg Hx     Heart Disease Neg Hx     Cancer Neg Hx      Medications:    No current outpatient medications on file prior to visit.     No current facility-administered medications on file prior to visit.     Allergies:    No Known Allergies      Vitals:    Vitals:    22 1103   BP: 114/68   BP Location: Left arm   Patient Position: Sitting   BP Cuff Size: Adult   Pulse: 81   Resp: 20   Temp: 36.2 °C (97.2 °F)   TempSrc: Temporal   SpO2: 94%   Weight: 72.1 kg (159 lb)   Height: 1.626 m (5' 4\")       Physical Exam:    Constitutional: Vital signs reviewed. Appears well-developed and well-nourished. No acute distress.   Eyes: Sclera white, conjunctivae clear.   ENT: Moderate-severe bilateral TMJ crepitus with tenderness with opening and closing mouth. External ears normal. External auditory canals normal without discharge. TMs translucent and non-bulging. Hearing normal. Lips/teeth are normal. Oral mucosa pink and moist. Posterior pharynx: WNL.  Neck: Neck supple.   Pulmonary/Chest: Respirations non-labored.   Lymph: Cervical nodes without tenderness or enlargement.  Musculoskeletal: Normal gait. No muscular atrophy or weakness.  Neurological: Alert and oriented to person, place, and time. Muscle tone normal. Coordination normal.   Skin: No rashes or lesions. Warm, dry, normal turgor.  Psychiatric: Normal mood and affect. Behavior is normal. Judgment and thought content normal.       Medical Decision Makin. TMJ syndrome  - ketorolac (TORADOL) injection 30 mg  - methylPREDNISolone (MEDROL DOSEPAK) 4 MG Tablet Therapy Pack; TAKE AS DIRECTED ON PACKAGE.  Dispense: 21 Tablet; Refill: 0    2. Nausea  - ondansetron (ZOFRAN ODT) 4 MG TABLET DISPERSIBLE; 1 TAB, ALLOW TO DISINTEGRATE IN MOUTH, EVERY 6 HOURS ONLY IF NEEDED FOR NAUSEA OR VOMITING.  Dispense: 20 Tablet; Refill: 0       Discussed with them DDX, management options, and risks, benefits, and alternatives to treatment plan " agreed upon.    Son present and helps translate. Patient is having pain around left ear x 1 month. She had her ear cleaned out at [place] during the past month. They report Toradol IM + Medrol Dose Peña rx'd by other provider on 8/5/22, worked and were tolerated for other painful condition, visit reviewed. She also complains of intermittent nausea and asks for something for nausea.    Moderate-severe bilateral TMJ crepitus with tenderness with opening and closing mouth.     ? etiology of pain around left ear.    No evidence of infection as cause of symptoms on exam.    Possible TMJ syndrome as cause of pain around left ear and offered Toradol IM + Medrol Dose Peña for anti-inflammatory treatment. They agree.    Agreeable to medications given and prescribed.    Discussed expected course of duration, time for improvement, and to seek follow-up in Emergency Room, urgent care, or with PCP if getting worse at any time or not improving within expected time frame.

## 2024-04-29 ENCOUNTER — APPOINTMENT (OUTPATIENT)
Dept: URGENT CARE | Facility: PHYSICIAN GROUP | Age: 69
End: 2024-04-29
Payer: MEDICARE

## 2024-04-29 ENCOUNTER — APPOINTMENT (OUTPATIENT)
Dept: URGENT CARE | Facility: PHYSICIAN GROUP | Age: 69
End: 2024-04-29

## 2024-07-01 ENCOUNTER — OFFICE VISIT (OUTPATIENT)
Dept: URBAN - METROPOLITAN AREA CLINIC 3 | Facility: CLINIC | Age: 69
End: 2024-07-01
Payer: MEDICARE

## 2024-07-01 DIAGNOSIS — H52.03 HYPERMETROPIA, BILATERAL: ICD-10-CM

## 2024-07-01 DIAGNOSIS — H25.813 COMBINED FORMS OF AGE-RELATED CATARACT, BILATERAL: ICD-10-CM

## 2024-07-01 DIAGNOSIS — H43.313 VITREOUS MEMBRANES AND STRANDS, BILATERAL: ICD-10-CM

## 2024-07-01 DIAGNOSIS — E11.9 TYPE 2 DIABETES MELLITUS WITHOUT COMPLICATIONS: Primary | ICD-10-CM

## 2024-07-01 PROCEDURE — 92004 COMPRE OPH EXAM NEW PT 1/>: CPT | Performed by: OPHTHALMOLOGY

## 2024-07-01 ASSESSMENT — INTRAOCULAR PRESSURE
OD: 17
OS: 15

## (undated) DEVICE — DRAPE LARGE 3 QUARTER - (20/CA)

## (undated) DEVICE — SET LEADWIRE 5 LEAD BEDSIDE DISPOSABLE ECG (1SET OF 5/EA)

## (undated) DEVICE — SUTURE 3-0 VICRYL PLUS - 12 X 18 INCH (12/BX)

## (undated) DEVICE — PACK MAJOR BASIN - (2EA/CA)

## (undated) DEVICE — SUTURE GENERAL

## (undated) DEVICE — CLIP LG INTNL HRZN TI ESCP LGT - (20/BX)

## (undated) DEVICE — CANISTER SUCTION 3000ML MECHANICAL FILTER AUTO SHUTOFF MEDI-VAC NONSTERILE LF DISP  (40EA/CA)

## (undated) DEVICE — LEGGING LITHOTOMY 31 X 48 IN - (2EA/PK 20PK/CA)

## (undated) DEVICE — LACTATED RINGERS INJ 1000 ML - (14EA/CA 60CA/PF)

## (undated) DEVICE — SPONGE LAP XR ST 36X36 LF - (1/PK40PK/CA)

## (undated) DEVICE — LIGASURE TISSUE FUSION  - SINGLE USE (6/CA)

## (undated) DEVICE — CLIP MED LG INTNL HRZN TI ESCP - (20/BX)

## (undated) DEVICE — GLOVE BIOGEL SZ 8 SURGICAL PF LTX - (50PR/BX 4BX/CA)

## (undated) DEVICE — TUBING CLEARLINK DUO-VENT - C-FLO (48EA/CA)

## (undated) DEVICE — STAPLE 40MM GREEN 4.8MM (6EA/BX)

## (undated) DEVICE — MASK ANESTHESIA ADULT  - (100/CA)

## (undated) DEVICE — CHLORAPREP 26 ML APPLICATOR - ORANGE TINT(25/CA)

## (undated) DEVICE — STAPLER CONTOUR CURVED GREEN 4.8MM W/STAPLE (3EA/BX)

## (undated) DEVICE — DRAPE LAPAROTOMY T SHEET - (12EA/CA)

## (undated) DEVICE — SPONGE XRAY 8X4 STERL. 12PL - (10EA/TY 80TY/CA)

## (undated) DEVICE — SUTURE 2-0 SILK SH C/R ETHICON (12PK/BX)

## (undated) DEVICE — SUTURE 3-0 SILK 12 X 18 IN - (36/BX)

## (undated) DEVICE — SUTURE 2-0 COATED VICRYL PLUS - 12 X 18 INCH (12/BX)

## (undated) DEVICE — GLOVE BIOGEL PI INDICATOR SZ 6.5 SURGICAL PF LF - (50/BX 4BX/CA)

## (undated) DEVICE — SPONGE GAUZESTER 4 X 4 4PLY - (128PK/CA)

## (undated) DEVICE — DRAPE UNDER BUTTOCK LRG (40/CA)

## (undated) DEVICE — PAD LAP STERILE 18 X 18 - (5/PK 40PK/CA)

## (undated) DEVICE — HEAD HOLDER JUNIOR/ADULT

## (undated) DEVICE — SUTURE 2-0 SILK 12 X 18" (36PK/BX)"

## (undated) DEVICE — TUBE CONNECT SUCTION CLEAR 120 X 1/4" (50EA/CA)"

## (undated) DEVICE — SLEEVE, VASO, THIGH, MED

## (undated) DEVICE — GLOVE BIOGEL INDICATOR SZ 7.5 SURGICAL PF LTX - (50PR/BX 4BX/CA)

## (undated) DEVICE — STAPLER 29MM CIRCULAR CURVED - (3EA/BX)

## (undated) DEVICE — SENSOR SPO2 NEO LNCS ADHESIVE (20/BX) SEE USER NOTES

## (undated) DEVICE — PROTECTOR ULNA NERVE - (36PR/CA)

## (undated) DEVICE — KIT ANESTHESIA W/CIRCUIT & 3/LT BAG W/FILTER (20EA/CA)

## (undated) DEVICE — SUTURE 3-0 VICRYL PLUS SH - 8X 18 INCH (12/BX)

## (undated) DEVICE — SUTURE 1 PDS II PLUS TP-1 - (12PK/BX)

## (undated) DEVICE — GOWN SURGEONS X-LARGE - DISP. (30/CA)

## (undated) DEVICE — ELECTRODE 850 FOAM ADHESIVE - HYDROGEL RADIOTRNSPRNT (50/PK)

## (undated) DEVICE — DRAPE MAYO STAND - (30/CA)

## (undated) DEVICE — SPONGE RADIOPAQUE CTN X-LG - STERILE (50PK/CA) MADE TO ORDER ITEM AND HAS A 4-6 WEEK LEAD TIME

## (undated) DEVICE — SPONGE PEANUT - (5/PK 50PK/CA)

## (undated) DEVICE — GOWN WARMING STANDARD FLEX - (30/CA)

## (undated) DEVICE — SODIUM CHL IRRIGATION 0.9% 1000ML (12EA/CA)

## (undated) DEVICE — STAPLER SKIN DISP - (6/BX 10BX/CA) VISISTAT

## (undated) DEVICE — SET EXTENSION WITH 2 PORTS (48EA/CA) ***PART #2C8610 IS A SUBSTITUTE*****

## (undated) DEVICE — CLIP MED INTNL HRZN TI ESCP - (25/BX)

## (undated) DEVICE — SUCTION INSTRUMENT YANKAUER BULBOUS TIP W/O VENT (50EA/CA)

## (undated) DEVICE — SUTURE 2-0 PROLENE SH (36PK/BX)

## (undated) DEVICE — NEPTUNE 4 PORT MANIFOLD - (20/PK)

## (undated) DEVICE — GLOVE BIOGEL PI INDICATOR SZ 8.0 SURGICAL PF LF -(50/BX 4BX/CA)

## (undated) DEVICE — ELECTRODE DUAL RETURN W/ CORD - (50/PK)